# Patient Record
Sex: FEMALE | Race: WHITE | NOT HISPANIC OR LATINO | Employment: FULL TIME | URBAN - METROPOLITAN AREA
[De-identification: names, ages, dates, MRNs, and addresses within clinical notes are randomized per-mention and may not be internally consistent; named-entity substitution may affect disease eponyms.]

---

## 2018-09-18 ENCOUNTER — HOSPITAL ENCOUNTER (EMERGENCY)
Facility: HOSPITAL | Age: 28
Discharge: HOME | End: 2018-09-18
Attending: EMERGENCY MEDICINE | Admitting: EMERGENCY MEDICINE

## 2018-09-18 ENCOUNTER — APPOINTMENT (OUTPATIENT)
Dept: ULTRASOUND IMAGING | Facility: HOSPITAL | Age: 28
End: 2018-09-18

## 2018-09-18 VITALS
BODY MASS INDEX: 23.56 KG/M2 | RESPIRATION RATE: 16 BRPM | HEART RATE: 79 BPM | SYSTOLIC BLOOD PRESSURE: 118 MMHG | TEMPERATURE: 98.8 F | HEIGHT: 60 IN | WEIGHT: 120 LBS | OXYGEN SATURATION: 97 % | DIASTOLIC BLOOD PRESSURE: 79 MMHG

## 2018-09-18 DIAGNOSIS — O03.4 INCOMPLETE MISCARRIAGE: Primary | ICD-10-CM

## 2018-09-18 LAB
BASOPHILS # BLD AUTO: 0.02 10*3/MM3 (ref 0–0.2)
BASOPHILS NFR BLD AUTO: 0.2 % (ref 0–1.5)
BILIRUB UR QL STRIP: NEGATIVE
CLARITY UR: CLEAR
CLUE CELLS SPEC QL WET PREP: ABNORMAL
COLOR UR: YELLOW
DEPRECATED RDW RBC AUTO: 45.4 FL (ref 37–54)
EOSINOPHIL # BLD AUTO: 0.35 10*3/MM3 (ref 0–0.7)
EOSINOPHIL NFR BLD AUTO: 3.1 % (ref 0.3–6.2)
ERYTHROCYTE [DISTWIDTH] IN BLOOD BY AUTOMATED COUNT: 12.8 % (ref 11.7–13)
GLUCOSE UR STRIP-MCNC: NEGATIVE MG/DL
HCG INTACT+B SERPL-ACNC: 143 MIU/ML
HCT VFR BLD AUTO: 37.5 % (ref 35.6–45.5)
HGB BLD-MCNC: 12.6 G/DL (ref 11.9–15.5)
HGB UR QL STRIP.AUTO: NEGATIVE
HOLD SPECIMEN: NORMAL
HOLD SPECIMEN: NORMAL
HYDATID CYST SPEC WET PREP: ABNORMAL
IMM GRANULOCYTES # BLD: 0.02 10*3/MM3 (ref 0–0.03)
IMM GRANULOCYTES NFR BLD: 0.2 % (ref 0–0.5)
KETONES UR QL STRIP: NEGATIVE
LEUKOCYTE ESTERASE UR QL STRIP.AUTO: NEGATIVE
LYMPHOCYTES # BLD AUTO: 3.3 10*3/MM3 (ref 0.9–4.8)
LYMPHOCYTES NFR BLD AUTO: 29.4 % (ref 19.6–45.3)
MCH RBC QN AUTO: 32.7 PG (ref 26.9–32)
MCHC RBC AUTO-ENTMCNC: 33.6 G/DL (ref 32.4–36.3)
MCV RBC AUTO: 97.4 FL (ref 80.5–98.2)
MONOCYTES # BLD AUTO: 0.92 10*3/MM3 (ref 0.2–1.2)
MONOCYTES NFR BLD AUTO: 8.2 % (ref 5–12)
NEUTROPHILS # BLD AUTO: 6.65 10*3/MM3 (ref 1.9–8.1)
NEUTROPHILS NFR BLD AUTO: 59.1 % (ref 42.7–76)
NITRITE UR QL STRIP: NEGATIVE
PH UR STRIP.AUTO: 7 [PH] (ref 5–8)
PLATELET # BLD AUTO: 325 10*3/MM3 (ref 140–500)
PMV BLD AUTO: 9.5 FL (ref 6–12)
PROT UR QL STRIP: NEGATIVE
RBC # BLD AUTO: 3.85 10*6/MM3 (ref 3.9–5.2)
SP GR UR STRIP: 1.01 (ref 1–1.03)
T VAGINALIS SPEC QL WET PREP: ABNORMAL
UROBILINOGEN UR QL STRIP: NORMAL
WBC NRBC COR # BLD: 11.24 10*3/MM3 (ref 4.5–10.7)
WBC SPEC QL WET PREP: ABNORMAL
WHOLE BLOOD HOLD SPECIMEN: NORMAL
WHOLE BLOOD HOLD SPECIMEN: NORMAL
YEAST GENITAL QL WET PREP: ABNORMAL

## 2018-09-18 PROCEDURE — 25010000002 ONDANSETRON PER 1 MG: Performed by: PHYSICIAN ASSISTANT

## 2018-09-18 PROCEDURE — 87591 N.GONORRHOEAE DNA AMP PROB: CPT | Performed by: PHYSICIAN ASSISTANT

## 2018-09-18 PROCEDURE — 81003 URINALYSIS AUTO W/O SCOPE: CPT | Performed by: NURSE PRACTITIONER

## 2018-09-18 PROCEDURE — 87491 CHLMYD TRACH DNA AMP PROBE: CPT | Performed by: PHYSICIAN ASSISTANT

## 2018-09-18 PROCEDURE — 76815 OB US LIMITED FETUS(S): CPT

## 2018-09-18 PROCEDURE — 96374 THER/PROPH/DIAG INJ IV PUSH: CPT

## 2018-09-18 PROCEDURE — P9612 CATHETERIZE FOR URINE SPEC: HCPCS

## 2018-09-18 PROCEDURE — 76817 TRANSVAGINAL US OBSTETRIC: CPT

## 2018-09-18 PROCEDURE — 84702 CHORIONIC GONADOTROPIN TEST: CPT | Performed by: NURSE PRACTITIONER

## 2018-09-18 PROCEDURE — 96375 TX/PRO/DX INJ NEW DRUG ADDON: CPT

## 2018-09-18 PROCEDURE — 93976 VASCULAR STUDY: CPT

## 2018-09-18 PROCEDURE — 25010000002 KETOROLAC TROMETHAMINE PER 15 MG: Performed by: PHYSICIAN ASSISTANT

## 2018-09-18 PROCEDURE — 87210 SMEAR WET MOUNT SALINE/INK: CPT | Performed by: PHYSICIAN ASSISTANT

## 2018-09-18 PROCEDURE — 99283 EMERGENCY DEPT VISIT LOW MDM: CPT

## 2018-09-18 PROCEDURE — 85025 COMPLETE CBC W/AUTO DIFF WBC: CPT | Performed by: NURSE PRACTITIONER

## 2018-09-18 RX ORDER — KETOROLAC TROMETHAMINE 30 MG/ML
15 INJECTION, SOLUTION INTRAMUSCULAR; INTRAVENOUS ONCE
Status: COMPLETED | OUTPATIENT
Start: 2018-09-18 | End: 2018-09-18

## 2018-09-18 RX ORDER — ONDANSETRON 2 MG/ML
4 INJECTION INTRAMUSCULAR; INTRAVENOUS ONCE
Status: COMPLETED | OUTPATIENT
Start: 2018-09-18 | End: 2018-09-18

## 2018-09-18 RX ORDER — SODIUM CHLORIDE 0.9 % (FLUSH) 0.9 %
10 SYRINGE (ML) INJECTION AS NEEDED
Status: DISCONTINUED | OUTPATIENT
Start: 2018-09-18 | End: 2018-09-18 | Stop reason: HOSPADM

## 2018-09-18 RX ADMIN — ONDANSETRON 4 MG: 2 INJECTION INTRAMUSCULAR; INTRAVENOUS at 18:02

## 2018-09-18 RX ADMIN — KETOROLAC TROMETHAMINE 15 MG: 30 INJECTION, SOLUTION INTRAMUSCULAR at 17:55

## 2018-09-18 NOTE — ED PROVIDER NOTES
EMERGENCY DEPARTMENT ENCOUNTER    Room Number:  35/35  Date seen:  9/18/2018  Time seen: 5:05 PM  PCP: Tigre Esquivel MD    HPI:  Chief complaint: vaginal bleeding  Context:Karissa Roach is a 27 y.o. female who presents to the ED with c/o increasing vaginal bleeding  and abdominal cramping during pregnancy. She does not know when her last period was, cannot estimate it, states she started having bleeding 4 weeks ago that has been continuous. She states that she has started to pass clots now as well and goes through a pad about every hour and a half. She also c/o cold chills, subjective fever, dysuria, and nausea.  She is approx 6 weeks pregnant and Pt is G3, P1, and AB1; she had a miscarriage 5 years ago. She was seen a few days ago at [facility] and had an ultrasound.    Onset: gradual  Location: vagina  Radiation: none  Duration: 2-3 weeks  Timing: constant  Character: bleeding with clots  Aggravating Factors: none  Alleviating Factors: none  Severity: moderate    MEDICAL RECORD REVIEW  Pt was seen at [facility] on 9/14/18 and she had a pelvic US that showed a small collection of fluid in the endometrial canal that could represent gestational sac at 5 weeks and 2 days or a blighted ovum or a pseudo-gestational sac as no yolk sac could be identified. Her hCG was 210. She is Rh- and rhogam was given.     ALLERGIES  Patient has no known allergies.    PAST MEDICAL HISTORY  Active Ambulatory Problems     Diagnosis Date Noted   • No Active Ambulatory Problems     Resolved Ambulatory Problems     Diagnosis Date Noted   • No Resolved Ambulatory Problems     Past Medical History:   Diagnosis Date   • Anemia        PAST SURGICAL HISTORY  Past Surgical History:   Procedure Laterality Date   • ABDOMINAL SURGERY         FAMILY HISTORY  History reviewed. No pertinent family history.    SOCIAL HISTORY  Social History     Social History   • Marital status: Single     Spouse name: N/A   • Number of children: N/A   •  Years of education: N/A     Occupational History   • Not on file.     Social History Main Topics   • Smoking status: Current Every Day Smoker   • Smokeless tobacco: Not on file   • Alcohol use Yes      Comment: socially   • Drug use: No   • Sexual activity: Defer     Other Topics Concern   • Not on file     Social History Narrative   • No narrative on file       REVIEW OF SYSTEMS  Review of Systems   Constitutional: Positive for chills and fever (subjective).   HENT: Negative.    Eyes: Negative.    Respiratory: Negative for shortness of breath.    Cardiovascular: Negative for chest pain.   Gastrointestinal: Positive for abdominal pain (suprapubic cramping) and nausea.   Genitourinary: Positive for dysuria and vaginal bleeding.   Musculoskeletal: Negative.    Skin: Negative.    Neurological: Negative.    Psychiatric/Behavioral: Negative.        PHYSICAL EXAM  ED Triage Vitals   Temp Heart Rate Resp BP SpO2   09/18/18 1525 09/18/18 1525 09/18/18 1525 09/18/18 1548 09/18/18 1525   98.9 °F (37.2 °C) 99 15 119/87 100 %      Temp src Heart Rate Source Patient Position BP Location FiO2 (%)   09/18/18 1525 09/18/18 1525 09/18/18 1548 09/18/18 1548 --   Tympanic Monitor Sitting Right arm      Physical Exam   Constitutional: She is oriented to person, place, and time and well-developed, well-nourished, and in no distress.   HENT:   Head: Normocephalic and atraumatic.   Right Ear: External ear normal.   Left Ear: External ear normal.   Nose: Nose normal.   Eyes: Conjunctivae are normal.   Neck: Normal range of motion.   Cardiovascular: Normal rate and regular rhythm.    Pulmonary/Chest: Effort normal and breath sounds normal.   Abdominal:   Normal bowel sounds  Soft  Mild suprapubic tenderness  Nondistended  No rebound, guarding, or rigidity  No appreciable organomegaly  No CVA tenderness     Genitourinary: Vulva normal. Cervix exhibits no tenderness. Left adnexum displays no tenderness.   Genitourinary Comments: Small amount  of dark red blood in the vaginal vault. No membranes visualized. The cervix is dilated to a fingertip. There was no cervical motion tenderness or adnxeal tenderness. There was some mild uterine tenderness. There was no discharge. There is some fluctuance in the right bartholin's with no induration or tenderness.   Musculoskeletal: Normal range of motion.   Neurological: She is alert and oriented to person, place, and time.   Skin: Skin is warm and dry.   Psychiatric: Affect normal.   Nursing note and vitals reviewed.      LAB RESULTS  Recent Results (from the past 24 hour(s))   Light Blue Top    Collection Time: 09/18/18  3:50 PM   Result Value Ref Range    Extra Tube hold for add-on    Green Top (Gel)    Collection Time: 09/18/18  3:50 PM   Result Value Ref Range    Extra Tube Hold for add-ons.    Lavender Top    Collection Time: 09/18/18  3:50 PM   Result Value Ref Range    Extra Tube hold for add-on    Gold Top - SST    Collection Time: 09/18/18  3:50 PM   Result Value Ref Range    Extra Tube Hold for add-ons.    hCG, Quantitative, Pregnancy    Collection Time: 09/18/18  3:50 PM   Result Value Ref Range    HCG Quantitative 143.00 mIU/mL   CBC Auto Differential    Collection Time: 09/18/18  3:50 PM   Result Value Ref Range    WBC 11.24 (H) 4.50 - 10.70 10*3/mm3    RBC 3.85 (L) 3.90 - 5.20 10*6/mm3    Hemoglobin 12.6 11.9 - 15.5 g/dL    Hematocrit 37.5 35.6 - 45.5 %    MCV 97.4 80.5 - 98.2 fL    MCH 32.7 (H) 26.9 - 32.0 pg    MCHC 33.6 32.4 - 36.3 g/dL    RDW 12.8 11.7 - 13.0 %    RDW-SD 45.4 37.0 - 54.0 fl    MPV 9.5 6.0 - 12.0 fL    Platelets 325 140 - 500 10*3/mm3    Neutrophil % 59.1 42.7 - 76.0 %    Lymphocyte % 29.4 19.6 - 45.3 %    Monocyte % 8.2 5.0 - 12.0 %    Eosinophil % 3.1 0.3 - 6.2 %    Basophil % 0.2 0.0 - 1.5 %    Immature Grans % 0.2 0.0 - 0.5 %    Neutrophils, Absolute 6.65 1.90 - 8.10 10*3/mm3    Lymphocytes, Absolute 3.30 0.90 - 4.80 10*3/mm3    Monocytes, Absolute 0.92 0.20 - 1.20 10*3/mm3     Eosinophils, Absolute 0.35 0.00 - 0.70 10*3/mm3    Basophils, Absolute 0.02 0.00 - 0.20 10*3/mm3    Immature Grans, Absolute 0.02 0.00 - 0.03 10*3/mm3   Urinalysis With Microscopic If Indicated (No Culture) - Urine, Catheter    Collection Time: 18  5:34 PM   Result Value Ref Range    Color, UA Yellow Yellow, Straw    Appearance, UA Clear Clear    pH, UA 7.0 5.0 - 8.0    Specific Gravity, UA 1.014 1.005 - 1.030    Glucose, UA Negative Negative    Ketones, UA Negative Negative    Bilirubin, UA Negative Negative    Blood, UA Negative Negative    Protein, UA Negative Negative    Leuk Esterase, UA Negative Negative    Nitrite, UA Negative Negative    Urobilinogen, UA 0.2 E.U./dL 0.2 - 1.0 E.U./dL   Wet Prep, Genital - Swab, Vagina    Collection Time: 18  5:38 PM   Result Value Ref Range    YEAST No yeast seen No yeast seen    HYPHAL ELEMENTS No Hyphal elements seen No Hyphal elements seen    WBC'S 1+ WBC's seen (A) No WBC's seen    Clue Cells, Wet Prep No Clue cells seen No Clue cells seen    Trichomonas, Wet Prep No Trichomonas seen No Trichomonas seen       I ordered the above labs and reviewed the results    RADIOLOGY  US Ob Limited 1 + Fetuses   Final Result      US Ob Transvaginal    (Results Pending)   US Testicular or Ovarian Vascular Limited    (Results Pending)   Reviewed Ultrasound which shows an incomplete spontaneous . Independently viewed by me. Interpreted by radiologist.      I ordered the above noted radiological studies and reviewed the images on the PACS system.     MEDICATIONS GIVEN IN ER  Medications   sodium chloride 0.9 % flush 10 mL (not administered)   ketorolac (TORADOL) injection 15 mg (15 mg Intravenous Given 18 1755)   ondansetron (ZOFRAN) injection 4 mg (4 mg Intravenous Given 18 1802)         PROCEDURES  Procedures      PROGRESS AND CONSULTS    Progress Notes:    ED Course as of Sep 18 2028   Tue Sep 18, 2018   1551 Vaginal bleeding x 2-3 weeks. Dx with  "threatened AB at [facility] 4 days ago, continuing to bleed. Couldn't get into OBGYN office, sees Dr Mary. LMP unknown. Rh neg, given Rho alicia at [facility].  [JS]      ED Course User Index  [JS] Emma Roberts, APRN       Informed Pt that her hCG quant has decreased which indicates that she is having a miscarriage. However, I am still concerned about her other symptoms including fever, chills, nausea, and dysuria.        Reviewed pt's history and workup with Dr. Werner.  After a bedside evaluation, Dr. Werner agrees with the plan of care.      Rechecked Pt who is resting comfortably. Informed Pt that her US shows findings consistentwith an incomplete .  I will consult with Dr. Mobley and discussed this with Pt prior to her discharge. Pt understands and agrees to all plans. All questions answered.   Placed call to Dr. Mobley ([facility]) for consult.       Discussed with Dr. Mobley who would like the patient to track her temperature at home with a thermometer. If above 100, he would like her to return to the ER. Otherwise he would like to see her in the office tomorrow afternoon.       Discussed plan with the patient. She verbalizes understanding and is agreeable. She is stable for discharge.       Disposition vitals:  /83 (Patient Position: Lying)   Pulse 75   Temp 98.9 °F (37.2 °C) (Tympanic)   Resp 18   Ht 152.4 cm (60\")   Wt 54.4 kg (120 lb)   SpO2 97%   BMI 23.44 kg/m²       DIAGNOSIS  Final diagnoses:   Incomplete miscarriage     DISCHARGE    Patient discharged in stable condition.    Reviewed implications of results, diagnosis, meds, responsibility to follow up, warning signs and symptoms of possible worsening, potential complications and reasons to return to ER, including new or worsening symptoms.    Patient/Family voiced understanding of above instructions.    Discussed plan for discharge, as there is no emergent indication for admission. Patient referred to primary care " provider for BP management due to today's BP. Pt/family is agreeable and understands need for follow up and repeat testing.  Pt is aware that discharge does not mean that nothing is wrong but it indicates no emergency is present that requires admission and they must continue care with follow-up as given below or physician of their choice.     FOLLOW-UP  Jose Mobley MD  [address] [place] [address]  [phone]    Schedule an appointment as soon as possible for a visit            Medication List      No changes were made to your prescriptions during this visit.           Documentation assistance provided by cheo Monroy for Debby Fuller PA-C.  Information recorded by the scribe was done at my direction and has been verified and validated by me.       Claudia Monroy  09/18/18 2002       Debby Fuller PA  09/18/18 2029

## 2018-09-18 NOTE — ED TRIAGE NOTES
Pt reports she was 6 weeks pregnant, was seen by OBGYN with [facility] two days ago and dx with miscarriage, pt reports vaginal bleeding x2 weeks, vaginal heavier and with clots today. OBGYN-Dr. Mobley

## 2018-09-18 NOTE — ED PROVIDER NOTES
Pt presents to the ED c/o vaginal bleeding that began 2 weeks ago. Pt states bleeding began as spotting and worsened today with heavy bleeding and passing clots. Pt c/o lower back pain and nausea. Pt denies vomiting. Pt was seen at [facility] 4 days ago ED and dx with a miscarriage. Pt was given Rhogam at that time. LMP was in July. Pt is /A1  OB/GYN Dr. Mobley    On exam,   Heart and lungs are normal  Mild suprapubic tenderness, no rebound  No CVA tenderness  Pelvic exam performed by TRUMAN Boateng  Pt's blood type is A-    Plan: Review pelvic ultrasound    Attestation:  The BROWN and I have discussed this patient's history, physical exam, and treatment plan.  I have reviewed the documentation and personally had a face to face interaction with the patient. I affirm the documentation and agree with the treatment and plan.  The attached note describes my personal findings.      Documentation assistance provided by cheo Lee for Dr. Werner. Information recorded by the scribe was done at my direction and has been verified and validated by me.          Kathia Lee  [phone]       Danyel Werner MD  [phone]

## 2018-09-19 ENCOUNTER — TELEPHONE (OUTPATIENT)
Dept: OBSTETRICS AND GYNECOLOGY | Facility: CLINIC | Age: 28
End: 2018-09-19

## 2018-09-19 ENCOUNTER — DOCUMENTATION (OUTPATIENT)
Dept: OBSTETRICS AND GYNECOLOGY | Facility: CLINIC | Age: 28
End: 2018-09-19

## 2018-09-19 ENCOUNTER — OFFICE VISIT (OUTPATIENT)
Dept: OBSTETRICS AND GYNECOLOGY | Facility: CLINIC | Age: 28
End: 2018-09-19

## 2018-09-19 ENCOUNTER — PROCEDURE VISIT (OUTPATIENT)
Dept: OBSTETRICS AND GYNECOLOGY | Facility: CLINIC | Age: 28
End: 2018-09-19

## 2018-09-19 VITALS
WEIGHT: 116.6 LBS | DIASTOLIC BLOOD PRESSURE: 70 MMHG | BODY MASS INDEX: 22.89 KG/M2 | HEIGHT: 60 IN | SYSTOLIC BLOOD PRESSURE: 116 MMHG

## 2018-09-19 DIAGNOSIS — O03.4 INCOMPLETE ABORTION: ICD-10-CM

## 2018-09-19 DIAGNOSIS — O03.9 MISCARRIAGE: Primary | ICD-10-CM

## 2018-09-19 DIAGNOSIS — O02.1 ABORTION, MISSED: Primary | ICD-10-CM

## 2018-09-19 PROCEDURE — 99204 OFFICE O/P NEW MOD 45 MIN: CPT | Performed by: OBSTETRICS & GYNECOLOGY

## 2018-09-19 PROCEDURE — 76817 TRANSVAGINAL US OBSTETRIC: CPT | Performed by: OBSTETRICS & GYNECOLOGY

## 2018-09-19 RX ORDER — LANOLIN ALCOHOL/MO/W.PET/CERES
CREAM (GRAM) TOPICAL DAILY
COMMUNITY
End: 2021-06-09

## 2018-09-19 NOTE — PROGRESS NOTES
Subjective    Chief Complaint   Patient presents with   • Follow-up     fu, missed ab still bleeding and cramping,       History of Present Illness    Karissa Roach is a 27 y.o. female who presents for missed .  Patient was seen twice in the ER and given RhoGam for being Rh-.  Patient seen today in follow-up from an emergency room visit yesterday and ultrasound still showing probable Rx of conception within the uterus.  She has continued to have bleeding and cramping.  She has no documented fever at home but does have chills occasionally she says.  She looks good overall but is tired of ongoing bleeding.  The bleeding has not been overly heavy recently but has occurred for several weeks.    Obstetric History:  OB History     No data available         Menstrual History:     No LMP recorded (lmp unknown).       Past Medical History:   Diagnosis Date   • Anemia      History reviewed. No pertinent family history.  History   Smoking Status   • Current Every Day Smoker   Smokeless Tobacco   • Never Used     Ready to quit: Not Answered  Counseling given: Not Answered      The following portions of the patient's history were reviewed and updated as appropriate: allergies, current medications, past family history, past medical history, past social history, past surgical history and problem list.    Review of Systems   Constitutional: Negative.  Negative for fever and unexpected weight change.   HENT: Negative.    Respiratory: Negative for shortness of breath and wheezing.    Cardiovascular: Negative for chest pain, palpitations and leg swelling.   Gastrointestinal: Negative for abdominal pain, anal bleeding and blood in stool.   Genitourinary: Negative for dysuria, pelvic pain, urgency, vaginal bleeding, vaginal discharge and vaginal pain.        Continual vaginal bleeding from incomplete miscarriage.   Skin: Negative.    Neurological: Negative.    Hematological: Negative.  Negative for adenopathy.  "  Psychiatric/Behavioral: Negative.  Negative for dysphoric mood. The patient is not nervous/anxious.             Objective   Physical Exam   Constitutional: She is oriented to person, place, and time. Vital signs are normal. She appears well-developed and well-nourished.   HENT:   Head: Normocephalic.   Neck: Trachea normal. No tracheal deviation present. No thyromegaly present.   Cardiovascular: Normal rate, regular rhythm and normal heart sounds.    No murmur heard.  Pulmonary/Chest: Effort normal and breath sounds normal.   Abdominal: Soft. Normal appearance. She exhibits no mass. There is no hepatosplenomegaly. There is no tenderness. No hernia.   Genitourinary: Rectum normal, vagina normal and uterus normal. Uterus is not enlarged and not tender. Cervix exhibits no motion tenderness. Right adnexum displays no mass and no tenderness. Left adnexum displays no mass and no tenderness. No vaginal discharge found.   Genitourinary Comments: External genitalia normal .  Mild vaginal bleeding from os which is closed but no products of conception seen.  Bimanual exam with no unusual tenderness or evidence of infection.   Lymphadenopathy:     She has no cervical adenopathy.     She has no axillary adenopathy.   Neurological: She is alert and oriented to person, place, and time.   Skin: Skin is warm and dry. No rash noted.   Psychiatric: She has a normal mood and affect. Her behavior is normal. Cognition and memory are normal.       /70   Ht 152.4 cm (60\")   Wt 52.9 kg (116 lb 9.6 oz)   LMP  (LMP Unknown)   BMI 22.77 kg/m²     Assessment/Plan   Karissa was seen today for follow-up.    Diagnoses and all orders for this visit:    Miscarriage  -     Case Request; Standing  -     Case Request    Other orders  -     Follow Anesthesia Guidelines / Standing Orders; Future  -     Follow Anesthesia Guidelines / Standing Orders; Standing  -     Obtain Informed Consent; Standing        Pelvic ultrasound.       Discussed " options in detail including Cytotec pill for medical treatment of miscarriage versus suction D&C.  Patient very much wants a D&C if she is very tired of her bleeding.  She has been explained in detail the risks and possible complications of D&C.  This includes the risk of anesthesia and possible uterine perforation in the need for additional surgery.  We will try to set that up tomorrow if at all possible.

## 2018-09-19 NOTE — PROGRESS NOTES
H&P Note    Patient Identification:  Name: Karissa Roach  Age: 27 y.o.  Sex: female  :  1990  MRN: [id number]                       Chief Complaint:  Miscarriage    History of Present Illness:   Patient is a 27-year-old  Ab1 white female who presented to the office with an incomplete miscarriage as diagnosed by serial ultrasound scans into emergency rooms showing retained products of conception with no fetal cardiac activity or evidence of viable pregnancy.  Patient's hCGs have dropped from 200-145.  She is still bleeding and wants a suction curettage understanding the risks of the surgery including uterine perforation possibility.  She is Rh- and has received RhoGam in  1 emergency room.    Problem List:  [unfilled]  Past Medical History:  Past Medical History:   Diagnosis Date   • Anemia      Past Surgical History:  Past Surgical History:   Procedure Laterality Date   • ABDOMINAL SURGERY     • OTHER SURGICAL HISTORY      removal of endometriosis in       Home Meds:    (Not in a hospital admission)  Current Meds:   [unfilled]  Allergies:  No Known Allergies  Immunizations:    There is no immunization history on file for this patient.  Social History:   Social History   Substance Use Topics   • Smoking status: Current Every Day Smoker   • Smokeless tobacco: Never Used   • Alcohol use Yes      Comment: socially      Family History:  No family history on file.     Review of Systems  Pertinent items are noted in HPI.    Objective:  tMax 24 hrs: @TMAX(24)@  Vitals Ranges:   Temp:  [98.8 °F (37.1 °C)] 98.8 °F (37.1 °C)  Heart Rate:  [75-79] 79  Resp:  [16-18] 16  BP: (115-118)/(70-83) 116/70  Intake and Output Last 3 Shifts:   [unfilled]    Exam:     General Appearance:    Alert, cooperative, no distress, appears stated age   Head:    Normocephalic, without obvious abnormality, atraumatic   Back:     Symmetric, no curvature, ROM normal, no CVA tenderness   Lungs:     Clear to auscultation  bilaterally, respirations unlabored   Chest Wall:    No tenderness or deformity    Heart:    Regular rate and rhythm, S1 and S2 normal, no murmur, rub   or gallop       Abdomen:     Soft, non-tender, bowel sounds active all four quadrants,     no masses, no organomegaly   Genitalia:    Normal female without lesion, discharge or tenderness.  Mild vaginal bleeding from closed cervix today with uterus minimally enlarged but not overly tender..  Adnexa negative.  Still with old clots in vagina.         Extremities:   Extremities normal, atraumatic, no cyanosis or edema   Skin:   Skin color, texture, turgor normal, no rashes or lesions           Data Review:     Lab Results (last 24 hours)     ** No results found for the last 24 hours. **        Assessment:  Active Problems:    * No active hospital problems. *      1. Incomplete Miscarriage     Plan:  1. Suction D&C        Jose Mobley MD  9/19/2018

## 2018-09-20 ENCOUNTER — ANESTHESIA EVENT (OUTPATIENT)
Dept: PERIOP | Facility: HOSPITAL | Age: 28
End: 2018-09-20

## 2018-09-20 ENCOUNTER — ANESTHESIA (OUTPATIENT)
Dept: PERIOP | Facility: HOSPITAL | Age: 28
End: 2018-09-20

## 2018-09-20 ENCOUNTER — HOSPITAL ENCOUNTER (OUTPATIENT)
Facility: HOSPITAL | Age: 28
Setting detail: HOSPITAL OUTPATIENT SURGERY
Discharge: HOME | End: 2018-09-20
Attending: OBSTETRICS & GYNECOLOGY | Admitting: OBSTETRICS & GYNECOLOGY

## 2018-09-20 VITALS
DIASTOLIC BLOOD PRESSURE: 76 MMHG | HEIGHT: 60 IN | SYSTOLIC BLOOD PRESSURE: 91 MMHG | OXYGEN SATURATION: 99 % | TEMPERATURE: 97.6 F | RESPIRATION RATE: 16 BRPM | HEART RATE: 79 BPM | BODY MASS INDEX: 22.68 KG/M2 | WEIGHT: 115.52 LBS

## 2018-09-20 DIAGNOSIS — O03.9 MISCARRIAGE: ICD-10-CM

## 2018-09-20 LAB
ABO GROUP BLD: NORMAL
BLD GP AB SCN SERPL QL: POSITIVE
RESIDUAL RHIG DETECTED: NORMAL
RH BLD: NEGATIVE
T&S EXPIRATION DATE: NORMAL

## 2018-09-20 PROCEDURE — 59812 TREATMENT OF MISCARRIAGE: CPT | Performed by: OBSTETRICS & GYNECOLOGY

## 2018-09-20 PROCEDURE — 86900 BLOOD TYPING SEROLOGIC ABO: CPT | Performed by: OBSTETRICS & GYNECOLOGY

## 2018-09-20 PROCEDURE — 86901 BLOOD TYPING SEROLOGIC RH(D): CPT | Performed by: OBSTETRICS & GYNECOLOGY

## 2018-09-20 PROCEDURE — 25010000002 FENTANYL CITRATE (PF) 100 MCG/2ML SOLUTION: Performed by: NURSE ANESTHETIST, CERTIFIED REGISTERED

## 2018-09-20 PROCEDURE — 25010000002 PROPOFOL 10 MG/ML EMULSION: Performed by: NURSE ANESTHETIST, CERTIFIED REGISTERED

## 2018-09-20 PROCEDURE — 88305 TISSUE EXAM BY PATHOLOGIST: CPT | Performed by: OBSTETRICS & GYNECOLOGY

## 2018-09-20 PROCEDURE — 25010000002 FENTANYL CITRATE (PF) 100 MCG/2ML SOLUTION

## 2018-09-20 PROCEDURE — 25010000002 RHO D IMMUNE GLOBULIN 1500 UNIT/2ML SOLUTION PREFILLED SYRINGE: Performed by: OBSTETRICS & GYNECOLOGY

## 2018-09-20 PROCEDURE — 25010000002 DEXAMETHASONE PER 1 MG: Performed by: NURSE ANESTHETIST, CERTIFIED REGISTERED

## 2018-09-20 PROCEDURE — 86850 RBC ANTIBODY SCREEN: CPT | Performed by: OBSTETRICS & GYNECOLOGY

## 2018-09-20 PROCEDURE — 25010000002 MIDAZOLAM PER 1 MG: Performed by: ANESTHESIOLOGY

## 2018-09-20 PROCEDURE — 86870 RBC ANTIBODY IDENTIFICATION: CPT | Performed by: OBSTETRICS & GYNECOLOGY

## 2018-09-20 PROCEDURE — 25010000002 ONDANSETRON PER 1 MG: Performed by: NURSE ANESTHETIST, CERTIFIED REGISTERED

## 2018-09-20 RX ORDER — MAGNESIUM HYDROXIDE 1200 MG/15ML
LIQUID ORAL AS NEEDED
Status: DISCONTINUED | OUTPATIENT
Start: 2018-09-20 | End: 2018-09-20 | Stop reason: HOSPADM

## 2018-09-20 RX ORDER — PROMETHAZINE HYDROCHLORIDE 25 MG/ML
12.5 INJECTION, SOLUTION INTRAMUSCULAR; INTRAVENOUS ONCE AS NEEDED
Status: DISCONTINUED | OUTPATIENT
Start: 2018-09-20 | End: 2018-09-20 | Stop reason: HOSPADM

## 2018-09-20 RX ORDER — ONDANSETRON 2 MG/ML
4 INJECTION INTRAMUSCULAR; INTRAVENOUS ONCE AS NEEDED
Status: DISCONTINUED | OUTPATIENT
Start: 2018-09-20 | End: 2018-09-20 | Stop reason: HOSPADM

## 2018-09-20 RX ORDER — FLUMAZENIL 0.1 MG/ML
0.2 INJECTION INTRAVENOUS AS NEEDED
Status: DISCONTINUED | OUTPATIENT
Start: 2018-09-20 | End: 2018-09-20 | Stop reason: HOSPADM

## 2018-09-20 RX ORDER — FENTANYL CITRATE 50 UG/ML
INJECTION, SOLUTION INTRAMUSCULAR; INTRAVENOUS AS NEEDED
Status: DISCONTINUED | OUTPATIENT
Start: 2018-09-20 | End: 2018-09-20 | Stop reason: SURG

## 2018-09-20 RX ORDER — LABETALOL HYDROCHLORIDE 5 MG/ML
5 INJECTION, SOLUTION INTRAVENOUS
Status: DISCONTINUED | OUTPATIENT
Start: 2018-09-20 | End: 2018-09-20 | Stop reason: HOSPADM

## 2018-09-20 RX ORDER — PROMETHAZINE HYDROCHLORIDE 25 MG/1
12.5 TABLET ORAL ONCE AS NEEDED
Status: DISCONTINUED | OUTPATIENT
Start: 2018-09-20 | End: 2018-09-20 | Stop reason: HOSPADM

## 2018-09-20 RX ORDER — FAMOTIDINE 10 MG/ML
20 INJECTION, SOLUTION INTRAVENOUS ONCE
Status: COMPLETED | OUTPATIENT
Start: 2018-09-20 | End: 2018-09-20

## 2018-09-20 RX ORDER — MIDAZOLAM HYDROCHLORIDE 1 MG/ML
1 INJECTION INTRAMUSCULAR; INTRAVENOUS
Status: DISCONTINUED | OUTPATIENT
Start: 2018-09-20 | End: 2018-09-20 | Stop reason: HOSPADM

## 2018-09-20 RX ORDER — PROPOFOL 10 MG/ML
VIAL (ML) INTRAVENOUS AS NEEDED
Status: DISCONTINUED | OUTPATIENT
Start: 2018-09-20 | End: 2018-09-20 | Stop reason: SURG

## 2018-09-20 RX ORDER — NALOXONE HCL 0.4 MG/ML
0.2 VIAL (ML) INJECTION AS NEEDED
Status: DISCONTINUED | OUTPATIENT
Start: 2018-09-20 | End: 2018-09-20 | Stop reason: HOSPADM

## 2018-09-20 RX ORDER — HYDROCODONE BITARTRATE AND ACETAMINOPHEN 7.5; 325 MG/1; MG/1
1 TABLET ORAL ONCE AS NEEDED
Status: DISCONTINUED | OUTPATIENT
Start: 2018-09-20 | End: 2018-09-20 | Stop reason: HOSPADM

## 2018-09-20 RX ORDER — PROMETHAZINE HYDROCHLORIDE 25 MG/1
25 SUPPOSITORY RECTAL ONCE AS NEEDED
Status: DISCONTINUED | OUTPATIENT
Start: 2018-09-20 | End: 2018-09-20 | Stop reason: HOSPADM

## 2018-09-20 RX ORDER — DEXAMETHASONE SODIUM PHOSPHATE 4 MG/ML
INJECTION, SOLUTION INTRA-ARTICULAR; INTRALESIONAL; INTRAMUSCULAR; INTRAVENOUS; SOFT TISSUE AS NEEDED
Status: DISCONTINUED | OUTPATIENT
Start: 2018-09-20 | End: 2018-09-20 | Stop reason: SURG

## 2018-09-20 RX ORDER — MIDAZOLAM HYDROCHLORIDE 1 MG/ML
2 INJECTION INTRAMUSCULAR; INTRAVENOUS
Status: DISCONTINUED | OUTPATIENT
Start: 2018-09-20 | End: 2018-09-20 | Stop reason: HOSPADM

## 2018-09-20 RX ORDER — SODIUM CHLORIDE 0.9 % (FLUSH) 0.9 %
1-10 SYRINGE (ML) INJECTION AS NEEDED
Status: DISCONTINUED | OUTPATIENT
Start: 2018-09-20 | End: 2018-09-20 | Stop reason: HOSPADM

## 2018-09-20 RX ORDER — SODIUM CHLORIDE, SODIUM LACTATE, POTASSIUM CHLORIDE, CALCIUM CHLORIDE 600; 310; 30; 20 MG/100ML; MG/100ML; MG/100ML; MG/100ML
9 INJECTION, SOLUTION INTRAVENOUS CONTINUOUS
Status: DISCONTINUED | OUTPATIENT
Start: 2018-09-20 | End: 2018-09-20 | Stop reason: HOSPADM

## 2018-09-20 RX ORDER — ONDANSETRON 2 MG/ML
INJECTION INTRAMUSCULAR; INTRAVENOUS AS NEEDED
Status: DISCONTINUED | OUTPATIENT
Start: 2018-09-20 | End: 2018-09-20 | Stop reason: SURG

## 2018-09-20 RX ORDER — OXYCODONE AND ACETAMINOPHEN 7.5; 325 MG/1; MG/1
1 TABLET ORAL ONCE AS NEEDED
Status: COMPLETED | OUTPATIENT
Start: 2018-09-20 | End: 2018-09-20

## 2018-09-20 RX ORDER — EPHEDRINE SULFATE 50 MG/ML
5 INJECTION, SOLUTION INTRAVENOUS ONCE AS NEEDED
Status: DISCONTINUED | OUTPATIENT
Start: 2018-09-20 | End: 2018-09-20 | Stop reason: HOSPADM

## 2018-09-20 RX ORDER — CEPHALEXIN 500 MG/1
500 CAPSULE ORAL 4 TIMES DAILY
Qty: 40 CAPSULE | Refills: 0 | Status: SHIPPED | OUTPATIENT
Start: 2018-09-20 | End: 2018-09-25

## 2018-09-20 RX ORDER — FENTANYL CITRATE 50 UG/ML
INJECTION, SOLUTION INTRAMUSCULAR; INTRAVENOUS
Status: COMPLETED
Start: 2018-09-20 | End: 2018-09-20

## 2018-09-20 RX ORDER — PROMETHAZINE HYDROCHLORIDE 25 MG/1
25 TABLET ORAL ONCE AS NEEDED
Status: DISCONTINUED | OUTPATIENT
Start: 2018-09-20 | End: 2018-09-20 | Stop reason: HOSPADM

## 2018-09-20 RX ORDER — FENTANYL CITRATE 50 UG/ML
50 INJECTION, SOLUTION INTRAMUSCULAR; INTRAVENOUS
Status: DISCONTINUED | OUTPATIENT
Start: 2018-09-20 | End: 2018-09-20 | Stop reason: HOSPADM

## 2018-09-20 RX ORDER — LIDOCAINE HYDROCHLORIDE 20 MG/ML
INJECTION, SOLUTION INFILTRATION; PERINEURAL AS NEEDED
Status: DISCONTINUED | OUTPATIENT
Start: 2018-09-20 | End: 2018-09-20 | Stop reason: SURG

## 2018-09-20 RX ORDER — DIPHENHYDRAMINE HYDROCHLORIDE 50 MG/ML
12.5 INJECTION INTRAMUSCULAR; INTRAVENOUS
Status: DISCONTINUED | OUTPATIENT
Start: 2018-09-20 | End: 2018-09-20 | Stop reason: HOSPADM

## 2018-09-20 RX ADMIN — FENTANYL CITRATE 50 MCG: 50 INJECTION INTRAMUSCULAR; INTRAVENOUS at 07:56

## 2018-09-20 RX ADMIN — FENTANYL CITRATE 25 MCG: 50 INJECTION INTRAMUSCULAR; INTRAVENOUS at 07:28

## 2018-09-20 RX ADMIN — OXYCODONE HYDROCHLORIDE AND ACETAMINOPHEN 1 TABLET: 7.5; 325 TABLET ORAL at 09:02

## 2018-09-20 RX ADMIN — FAMOTIDINE 20 MG: 10 INJECTION, SOLUTION INTRAVENOUS at 06:27

## 2018-09-20 RX ADMIN — DEXAMETHASONE SODIUM PHOSPHATE 4 MG: 4 INJECTION INTRA-ARTICULAR; INTRALESIONAL; INTRAMUSCULAR; INTRAVENOUS; SOFT TISSUE at 07:24

## 2018-09-20 RX ADMIN — MIDAZOLAM HYDROCHLORIDE 1 MG: 2 INJECTION, SOLUTION INTRAMUSCULAR; INTRAVENOUS at 06:27

## 2018-09-20 RX ADMIN — HUMAN RHO(D) IMMUNE GLOBULIN 1500 UNITS: 1500 SOLUTION INTRAMUSCULAR; INTRAVENOUS at 08:32

## 2018-09-20 RX ADMIN — SODIUM CHLORIDE, POTASSIUM CHLORIDE, SODIUM LACTATE AND CALCIUM CHLORIDE 9 ML/HR: 600; 310; 30; 20 INJECTION, SOLUTION INTRAVENOUS at 06:27

## 2018-09-20 RX ADMIN — FENTANYL CITRATE 50 MCG: 50 INJECTION, SOLUTION INTRAMUSCULAR; INTRAVENOUS at 07:56

## 2018-09-20 RX ADMIN — PROPOFOL 200 MG: 10 INJECTION, EMULSION INTRAVENOUS at 07:20

## 2018-09-20 RX ADMIN — ONDANSETRON 4 MG: 2 INJECTION INTRAMUSCULAR; INTRAVENOUS at 07:26

## 2018-09-20 RX ADMIN — LIDOCAINE HYDROCHLORIDE 100 MG: 20 INJECTION, SOLUTION INFILTRATION; PERINEURAL at 07:20

## 2018-09-20 RX ADMIN — FENTANYL CITRATE 50 MCG: 50 INJECTION INTRAMUSCULAR; INTRAVENOUS at 08:20

## 2018-09-20 NOTE — ANESTHESIA PROCEDURE NOTES
Airway  Urgency: elective    Date/Time: 9/20/2018 7:21 AM  Airway not difficult    General Information and Staff    Patient location during procedure: OR  Anesthesiologist: DONNIE MAR  CRNA: BUDDY CAMPBELL    Indications and Patient Condition  Indications for airway management: airway protection    Preoxygenated: yes  MILS maintained throughout  Mask difficulty assessment: 1 - vent by mask    Final Airway Details  Final airway type: supraglottic airway      Successful airway: classic  Size 4    Number of attempts at approach: 1    Additional Comments  Patient in OR. Monitors on. BLVS. Pre 02 100%. SIVI. LMA placed with ease. No leak present. BBS and ETCO2 present. Secured. Teeth/lips as preop.

## 2018-09-20 NOTE — OP NOTE
Operative Note      Karissa Roach  27 y.o.  1990  female  [id number]      9/20/2018    Surgeon(s) and Role:     * Jose Mobley MD - Primary     Pre-op Diagnosis:   Miscarriage [O03.9]    Post-Op Diagnosis Codes:     * Miscarriage [O03.9]    Post-operative Diagnosis: Same  Surgeon= Leandra                   Assist= NA  Findings/Complications:  Minor amount of old products of conception obtained    Description of procedure:  Procedure(s) and Anesthesia Type:     * DILATATION AND CURETTAGE WITH SUCTION - General     The patient was brought to the OR with IV running and general anesthesia administered and she was placed in the dorsal lithotomy position.  She was prepped and draped usual manner for vaginal procedure.  Examination under anesthesia revealed a normal-size anteverted uterus with no adnexal masses.  There was a 2-3 cm right Bartholin's cyst present and this will be followed up in the office as the patient did not discuss having any surgery done on this prior to today.  A weighted speculum was placed in the vagina and a Adams tenaculum on the anterior lip of the cervix and the endocervical canal was carefully dilated.  A suction curettage was then performed with a minor amount of old clots and products of conception obtained and sent to pathology.  Examination after this with a large sharp curette revealed the endometrial canal to be totally empty and a resection and also obtained no additional tissue.  All instruments were then removed with minimal blood loss and all counts correct and no complications and the patient was transferred to the recovery room in satisfactory condition.  She was ordered RhoGam postop due to her Rh- status.    Keflex 500 mg QID sent to pharmacy for Bartholin cyst in case getting infected   Anesthesia= general    Estimated Blood Loss: 20 mL    Specimens:   Order Name Source Comment Collection Info Order Time   TYPE AND SCREEN   Collected By: Daphnie Norton, KEITH 9/20/2018   5:43 AM   TISSUE PATHOLOGY EXAM Products of Conception  Collected By: Jose Mobley MD 9/20/2018  7:36 AM       [unfilled]      Jose Mobley MD  9/20/2018

## 2018-09-20 NOTE — ANESTHESIA POSTPROCEDURE EVALUATION
"Patient: Karissa Roach    Procedure Summary     Date:  09/20/18 Room / Location:  [facility] OR 09 / [facility] MAIN OR    Anesthesia Start:  0716 Anesthesia Stop:  0743    Procedure:  DILATATION AND CURETTAGE WITH SUCTION (N/A Vagina) Diagnosis:       Miscarriage      (Miscarriage [O03.9])    Surgeon:  Jose Mobley MD Provider:  Khadra Roberts MD    Anesthesia Type:  general ASA Status:  2          Anesthesia Type: general  Last vitals  BP   96/63 (09/20/18 0840)   Temp   36.4 °C (97.6 °F) (09/20/18 0744)   Pulse   73 (09/20/18 0840)   Resp   16 (09/20/18 0840)     SpO2   100 % (09/20/18 0840)     Post Anesthesia Care and Evaluation    Patient location during evaluation: bedside  Patient participation: complete - patient participated  Level of consciousness: awake  Pain management: adequate  Airway patency: patent  Anesthetic complications: No anesthetic complications    Cardiovascular status: acceptable  Respiratory status: acceptable  Hydration status: acceptable    Comments: BP 96/63   Pulse 73   Temp 36.4 °C (97.6 °F) (Oral)   Resp 16   Ht 152.4 cm (60\")   Wt 52.4 kg (115 lb 8.3 oz)   LMP  (LMP Unknown)   SpO2 100%   BMI 22.56 kg/m²         "

## 2018-09-20 NOTE — ANESTHESIA PREPROCEDURE EVALUATION
Anesthesia Evaluation     no history of anesthetic complications:               Airway   Mallampati: I  TM distance: >3 FB  Neck ROM: full  Dental - normal exam     Pulmonary    (+) a smoker Current,   (-) asthma  Cardiovascular     (-) hypertension, valvular problems/murmurs      Neuro/Psych  (-) dizziness/light headedness  GI/Hepatic/Renal/Endo    (-) hepatitis    Musculoskeletal     Abdominal    Substance History      OB/GYN          Other                        Anesthesia Plan    ASA 2     general     intravenous induction   Anesthetic plan, all risks, benefits, and alternatives have been provided, discussed and informed consent has been obtained with: patient.

## 2018-09-20 NOTE — H&P (VIEW-ONLY)
H&P Note    Patient Identification:  Name: Karissa Roach  Age: 27 y.o.  Sex: female  :  1990  MRN: [id number]                       Chief Complaint:  Miscarriage    History of Present Illness:   Patient is a 27-year-old  Ab1 white female who presented to the office with an incomplete miscarriage as diagnosed by serial ultrasound scans into emergency rooms showing retained products of conception with no fetal cardiac activity or evidence of viable pregnancy.  Patient's hCGs have dropped from 200-145.  She is still bleeding and wants a suction curettage understanding the risks of the surgery including uterine perforation possibility.  She is Rh- and has received RhoGam in  1 emergency room.    Problem List:  [unfilled]  Past Medical History:  Past Medical History:   Diagnosis Date   • Anemia      Past Surgical History:  Past Surgical History:   Procedure Laterality Date   • ABDOMINAL SURGERY     • OTHER SURGICAL HISTORY      removal of endometriosis in       Home Meds:    (Not in a hospital admission)  Current Meds:   [unfilled]  Allergies:  No Known Allergies  Immunizations:    There is no immunization history on file for this patient.  Social History:   Social History   Substance Use Topics   • Smoking status: Current Every Day Smoker   • Smokeless tobacco: Never Used   • Alcohol use Yes      Comment: socially      Family History:  No family history on file.     Review of Systems  Pertinent items are noted in HPI.    Objective:  tMax 24 hrs: @TMAX(24)@  Vitals Ranges:   Temp:  [98.8 °F (37.1 °C)] 98.8 °F (37.1 °C)  Heart Rate:  [75-79] 79  Resp:  [16-18] 16  BP: (115-118)/(70-83) 116/70  Intake and Output Last 3 Shifts:   [unfilled]    Exam:     General Appearance:    Alert, cooperative, no distress, appears stated age   Head:    Normocephalic, without obvious abnormality, atraumatic   Back:     Symmetric, no curvature, ROM normal, no CVA tenderness   Lungs:     Clear to auscultation  bilaterally, respirations unlabored   Chest Wall:    No tenderness or deformity    Heart:    Regular rate and rhythm, S1 and S2 normal, no murmur, rub   or gallop       Abdomen:     Soft, non-tender, bowel sounds active all four quadrants,     no masses, no organomegaly   Genitalia:    Normal female without lesion, discharge or tenderness.  Mild vaginal bleeding from closed cervix today with uterus minimally enlarged but not overly tender..  Adnexa negative.  Still with old clots in vagina.         Extremities:   Extremities normal, atraumatic, no cyanosis or edema   Skin:   Skin color, texture, turgor normal, no rashes or lesions           Data Review:     Lab Results (last 24 hours)     ** No results found for the last 24 hours. **        Assessment:  Active Problems:    * No active hospital problems. *      1. Incomplete Miscarriage     Plan:  1. Suction D&C        Jose Mobley MD  9/19/2018

## 2018-09-21 LAB
C TRACH RRNA SPEC DONR QL NAA+PROBE: NEGATIVE
CYTO UR: NORMAL
LAB AP CASE REPORT: NORMAL
N GONORRHOEA DNA SPEC QL NAA+PROBE: NEGATIVE
PATH REPORT.FINAL DX SPEC: NORMAL
PATH REPORT.GROSS SPEC: NORMAL

## 2018-09-25 ENCOUNTER — HOSPITAL ENCOUNTER (EMERGENCY)
Facility: HOSPITAL | Age: 28
Discharge: HOME | End: 2018-09-25
Attending: EMERGENCY MEDICINE | Admitting: EMERGENCY MEDICINE

## 2018-09-25 VITALS
HEART RATE: 99 BPM | TEMPERATURE: 96.5 F | HEIGHT: 60 IN | WEIGHT: 115 LBS | BODY MASS INDEX: 22.58 KG/M2 | DIASTOLIC BLOOD PRESSURE: 76 MMHG | OXYGEN SATURATION: 98 % | SYSTOLIC BLOOD PRESSURE: 122 MMHG | RESPIRATION RATE: 17 BRPM

## 2018-09-25 DIAGNOSIS — N75.1 BARTHOLIN'S GLAND ABSCESS: Primary | ICD-10-CM

## 2018-09-25 PROCEDURE — 99283 EMERGENCY DEPT VISIT LOW MDM: CPT

## 2018-09-25 RX ORDER — OXYCODONE HYDROCHLORIDE AND ACETAMINOPHEN 5; 325 MG/1; MG/1
1 TABLET ORAL ONCE
Status: COMPLETED | OUTPATIENT
Start: 2018-09-25 | End: 2018-09-25

## 2018-09-25 RX ORDER — HYDROCODONE BITARTRATE AND ACETAMINOPHEN 5; 325 MG/1; MG/1
1 TABLET ORAL EVERY 6 HOURS PRN
Qty: 8 TABLET | Refills: 0 | Status: SHIPPED | OUTPATIENT
Start: 2018-09-25 | End: 2021-06-09

## 2018-09-25 RX ORDER — DIAZEPAM 5 MG/1
5 TABLET ORAL ONCE
Status: COMPLETED | OUTPATIENT
Start: 2018-09-25 | End: 2018-09-25

## 2018-09-25 RX ORDER — LIDOCAINE HYDROCHLORIDE AND EPINEPHRINE 10; 10 MG/ML; UG/ML
10 INJECTION, SOLUTION INFILTRATION; PERINEURAL ONCE
Status: COMPLETED | OUTPATIENT
Start: 2018-09-25 | End: 2018-09-25

## 2018-09-25 RX ADMIN — DIAZEPAM 5 MG: 5 TABLET ORAL at 16:15

## 2018-09-25 RX ADMIN — LIDOCAINE HYDROCHLORIDE AND EPINEPHRINE 10 ML: 10; 10 INJECTION, SOLUTION INFILTRATION; PERINEURAL at 17:39

## 2018-09-25 RX ADMIN — OXYCODONE HYDROCHLORIDE AND ACETAMINOPHEN 1 TABLET: 5; 325 TABLET ORAL at 16:15

## 2018-09-25 RX ADMIN — LIDOCAINE HYDROCHLORIDE: 20 JELLY TOPICAL at 17:01

## 2018-09-25 NOTE — ED PROVIDER NOTES
MD ATTESTATION NOTE  Pt presents with a Bartholin's gland abscess that has been incised and drained by Earline Chávez (APRN). Pt's conditions has improved and will be discharged with pain medication and antibiotics. Pt will follow up with her OB. Pt understands and agrees with the plan.     On Exam: See Saira (LANDEN) note for PE details     The BROWN and I have discussed this patient's history, physical exam, and treatment plan.  I have reviewed the documentation and personally had a face to face interaction with the patient. I affirm the documentation and agree with the treatment and plan.  The attached note describes my personal findings.    Documentation assistance provided by cheo Hernandez for Dr. Zapata.  Information recorded by the maiibvamshi was done at my direction and has been verified and validated by me.     Kristin Hernandez  09/25/18 [id number]       Ariel Zapata MD  09/25/18 4231

## 2018-09-25 NOTE — ED NOTES
Pt had cyst removal surgery by Dr. Mobley last Thursday. She is taking an antibiotic, but states that she is having worsening pain. Was told by Dr. Mobley's office to come here for eval.      Fiorella Wiggins RN  09/25/18 [id number]

## 2018-09-25 NOTE — ED NOTES
Pt attempted to give clean catch urine. Pt unable to at this time.      Rody Dent RN  09/25/18 9435

## 2018-09-25 NOTE — ED PROVIDER NOTES
EMERGENCY DEPARTMENT ENCOUNTER    Room Number:  39/39  Date seen:  9/25/2018  Time seen: 3:51 PM  PCP: Tigre Esquivel MD    HPI:  Chief complaint: vaginal pain  Context:Karissa Roach is a 27 y.o. female who had D&C last Thursday per Dr. Mobley for miscarriage. In the operative note it was mentioned she had a Bartholin's cyst but this had not been addressed preoperatively so no procedure was completed.  He did put her on oral antibiotics.  She presents today with worsening pain of Bartholin's cyst and swelling. She also reports yellow vaginal dishcarge.      Timing: constant  Duration: several days  Location: vagina  Radiation:no  Quality: painful and swelling is increasing  Intensity/Severity: moderate  Associated Symptoms: difficulty sitting  Aggravating Factors: sitting  Alleviating Factors: no  Previous Episodes:no  Treatment before arrival: no    MEDICAL RECORD REVIEW    Jose Mobley MD   Obstetrics      []Manual[]Template  []Copied  Operative Note        Karissa Roach  27 y.o.  1990  female  [id number]        9/20/2018     Surgeon(s) and Role:     * Jose Mobley MD - Primary      Pre-op Diagnosis:   Miscarriage [O03.9]     Post-Op Diagnosis Codes:     * Miscarriage [O03.9]     Post-operative Diagnosis: Same  Surgeon= Leandra                   Assist= NA  Findings/Complications:  Minor amount of old products of conception obtained     Description of procedure:  Procedure(s) and Anesthesia Type:     * DILATATION AND CURETTAGE WITH SUCTION - General     The patient was brought to the OR with IV running and general anesthesia administered and she was placed in the dorsal lithotomy position.  She was prepped and draped usual manner for vaginal procedure.  Examination under anesthesia revealed a normal-size anteverted uterus with no adnexal masses.  There was a 2-3 cm right Bartholin's cyst present and this will be followed up in the office as the patient did not discuss having any surgery done on this  prior to today.  A weighted speculum was placed in the vagina and a Adams tenaculum on the anterior lip of the cervix and the endocervical canal was carefully dilated.  A suction curettage was then performed with a minor amount of old clots and products of conception obtained and sent to pathology.  Examination after this with a large sharp curette revealed the endometrial canal to be totally empty and a resection and also obtained no additional tissue.  All instruments were then removed with minimal blood loss and all counts correct and no complications and the patient was transferred to the recovery room in satisfactory condition.  She was ordered RhoGam postop due to her Rh- status.     Keflex 500 mg QID sent to pharmacy for Bartholin cyst in case getting infected           ALLERGIES  Patient has no known allergies.    PAST MEDICAL HISTORY  Active Ambulatory Problems     Diagnosis Date Noted   • No Active Ambulatory Problems     Resolved Ambulatory Problems     Diagnosis Date Noted   • No Resolved Ambulatory Problems     Past Medical History:   Diagnosis Date   • Anemia        PAST SURGICAL HISTORY  Past Surgical History:   Procedure Laterality Date   • ABDOMINAL SURGERY     • D&C WITH SUCTION N/A 9/20/2018    Procedure: DILATATION AND CURETTAGE WITH SUCTION;  Surgeon: Jose Mobley MD;  Location: [facility];  Service: Obstetrics/Gynecology   • OTHER SURGICAL HISTORY      removal of endometriosis in 2014       FAMILY HISTORY  Family History   Problem Relation Age of Onset   • Malig Hyperthermia Neg Hx        SOCIAL HISTORY  Social History     Social History   • Marital status: Single     Spouse name: N/A   • Number of children: N/A   • Years of education: N/A     Occupational History   • Not on file.     Social History Main Topics   • Smoking status: Current Every Day Smoker   • Smokeless tobacco: Never Used   • Alcohol use Yes      Comment: socially   • Drug use: No   • Sexual activity: Defer     Other  Topics Concern   • Not on file     Social History Narrative   • No narrative on file       REVIEW OF SYSTEMS  Review of Systems    PHYSICAL EXAM  ED Triage Vitals   Temp Heart Rate Resp BP SpO2   09/25/18 1535 09/25/18 1537 09/25/18 1537 09/25/18 1538 09/25/18 1537   96.5 °F (35.8 °C) (!) 129 20 124/84 98 %      Temp src Heart Rate Source Patient Position BP Location FiO2 (%)   09/25/18 1535 09/25/18 1538 09/25/18 1538 09/25/18 1538 --   Tympanic Monitor Sitting Right arm      Physical Exam    LAB RESULTS  No results found for this or any previous visit (from the past 24 hour(s)).    I ordered the above labs and reviewed the results    MEDICATIONS GIVEN IN ER  Medications   lidocaine-EPINEPHrine (XYLOCAINE W/EPI) 1 %-1:528550 injection 10 mL (not administered)   lidocaine (XYLOCAINE) 2 % jelly 30 mL (not administered)   diazePAM (VALIUM) tablet 5 mg (5 mg Oral Given 9/25/18 1615)   oxyCODONE-acetaminophen (PERCOCET) 5-325 MG per tablet 1 tablet (1 tablet Oral Given 9/25/18 1615)       EKG  Interpreted by ED Physician    PROCEDURES  Incision & Drainage  Date/Time: 9/25/2018 5:38 PM  Performed by: ADRIAN KIMBROUGH  Authorized by: FELICIA HUANG     Consent:     Consent obtained:  Verbal    Consent given by:  Patient    Risks discussed:  Pain    Alternatives discussed:  Delayed treatment  Location:     Type:  Bartholin cyst  Pre-procedure details:     Skin preparation:  Chloraprep  Sedation:     Sedation type:  Anxiolysis  Anesthesia (see MAR for exact dosages):     Anesthesia method:  Topical application and local infiltration    Topical anesthetic:  Lidocaine gel    Local anesthetic:  Lidocaine 1% WITH epi  Procedure type:     Complexity:  Simple  Procedure details:     Scalpel blade:  11    Wound management:  Probed and deloculated    Drainage:  Bloody and purulent    Drainage amount:  Copious    Wound treatment:  Wound left open    Packing materials:  None  Post-procedure details:     Patient tolerance of  "procedure:  Tolerated well, no immediate complications        COURSE & MEDICAL DECISION MAKING  Pertinent Labs and Imaging studies that were ordered and reviewed are noted above.  Results were reviewed/discussed with the patient and they were also made aware of online access.  Pt also made aware that some labs, such as cultures, will not be resulted during ER visit and follow up with PMD is necessary.     PROGRESS AND CONSULTS    Progress Notes:    ED Course as of Sep 25 1737   Tue Sep 25, 2018   1541 Recent D&C last week for miscarriage.  He noted Bartholin's cyst but it was not discussed preop.  He did put her on abx after, but she states area is larger and more inflamed and very painful.   [EP]      ED Course User Index  [EP] Earline Chávez, APRN       1558:Reviewed pt's history and workup with Dr. Zapata.  After a bedside evaluation, Dr. Zapata agrees with the plan of care.    1740: The patient's history, physical exam, and lab findings were discussed with the physician, who also performed a face to face history and physical exam.  I discussed all results and noted any abnormalities with patient.  Discussed absoute need to recheck abnormalities with their family physician.  I answered any of the patient's questions.  Discussed plan for discharge, as there is no emergent indication for admission.  Pt is agreeable and understands need for follow up and repeat testing.  Pt is aware that discharge does not mean that nothing is wrong but it indicates no emergency is present and they must continue care with their family physician.  Pt is discharged with instructions to follow up with primary care doctor to have their blood pressure rechecked.         Disposition vitals:  /84 (BP Location: Right arm, Patient Position: Sitting)   Pulse 107   Temp 96.5 °F (35.8 °C) (Tympanic)   Resp 17   Ht 152.4 cm (60\")   Wt 52.2 kg (115 lb)   LMP  (LMP Unknown)   SpO2 98%   BMI 22.46 kg/m²       DIAGNOSIS  Final " diagnoses:   Bartholin's gland abscess, right labia       FOLLOW UP   Jose Mobley MD  [address]  [place] [id number] [phone]      Keep appointment next Thursday as scheduled      RX     Medication List      New Prescriptions    HYDROcodone-acetaminophen 5-325 MG per tablet  Commonly known as:  NORCO  Take 1 tablet by mouth Every 6 (Six) Hours As Needed for Moderate Pain .        Stop    cephalexin 500 MG capsule  Commonly known as:  KEFLEX          Johnny Report  Johnny report [id number] reviewed.   After examining the available clinical information and risks of prescribing controlled substances (including non-treatment or other adjunct treatments), it is considered medically appropriate that a controlled medication be prescribed.  I discussed risks/benefits/alternatives of using a controlled substance including risk of tolerance and dependence.  I discussed with patient (and family if applicable) that the patient should not drive, operate machinery, or otherwise engage in risky behavior as this medication may impair judgment and/or motor capabilities. I discussed that the patient will receive only a short-term controlled substance prescription for management of acute symptoms and that any additional medication needs should be addressed by the primary care provider or appropriate specialist.               Earline Chávez, APRN  09/25/18 173

## 2018-09-28 ENCOUNTER — HOSPITAL ENCOUNTER (EMERGENCY)
Facility: HOSPITAL | Age: 28
Discharge: HOME | End: 2018-09-28
Admitting: EMERGENCY MEDICINE

## 2018-09-28 VITALS
WEIGHT: 117 LBS | OXYGEN SATURATION: 100 % | SYSTOLIC BLOOD PRESSURE: 122 MMHG | HEIGHT: 62 IN | RESPIRATION RATE: 16 BRPM | TEMPERATURE: 97.6 F | HEART RATE: 85 BPM | DIASTOLIC BLOOD PRESSURE: 78 MMHG | BODY MASS INDEX: 21.53 KG/M2

## 2018-09-28 DIAGNOSIS — N75.1 BARTHOLIN'S GLAND ABSCESS: Primary | ICD-10-CM

## 2018-09-28 LAB
BASOPHILS # BLD AUTO: 0.03 10*3/MM3 (ref 0–0.2)
BASOPHILS NFR BLD AUTO: 0.2 % (ref 0–1.5)
DEPRECATED RDW RBC AUTO: 45.9 FL (ref 37–54)
EOSINOPHIL # BLD AUTO: 0.15 10*3/MM3 (ref 0–0.7)
EOSINOPHIL NFR BLD AUTO: 0.8 % (ref 0.3–6.2)
ERYTHROCYTE [DISTWIDTH] IN BLOOD BY AUTOMATED COUNT: 12.9 % (ref 11.7–13)
HCT VFR BLD AUTO: 42.4 % (ref 35.6–45.5)
HGB BLD-MCNC: 13.9 G/DL (ref 11.9–15.5)
IMM GRANULOCYTES # BLD: 0.03 10*3/MM3 (ref 0–0.03)
IMM GRANULOCYTES NFR BLD: 0.2 % (ref 0–0.5)
LYMPHOCYTES # BLD AUTO: 2.81 10*3/MM3 (ref 0.9–4.8)
LYMPHOCYTES NFR BLD AUTO: 15.6 % (ref 19.6–45.3)
MCH RBC QN AUTO: 32.2 PG (ref 26.9–32)
MCHC RBC AUTO-ENTMCNC: 32.8 G/DL (ref 32.4–36.3)
MCV RBC AUTO: 98.1 FL (ref 80.5–98.2)
MONOCYTES # BLD AUTO: 1.27 10*3/MM3 (ref 0.2–1.2)
MONOCYTES NFR BLD AUTO: 7 % (ref 5–12)
NEUTROPHILS # BLD AUTO: 13.76 10*3/MM3 (ref 1.9–8.1)
NEUTROPHILS NFR BLD AUTO: 76.2 % (ref 42.7–76)
PLATELET # BLD AUTO: 367 10*3/MM3 (ref 140–500)
PMV BLD AUTO: 9.8 FL (ref 6–12)
RBC # BLD AUTO: 4.32 10*6/MM3 (ref 3.9–5.2)
WBC NRBC COR # BLD: 18.05 10*3/MM3 (ref 4.5–10.7)

## 2018-09-28 PROCEDURE — 99283 EMERGENCY DEPT VISIT LOW MDM: CPT

## 2018-09-28 PROCEDURE — 25010000002 MORPHINE PER 10 MG: Performed by: EMERGENCY MEDICINE

## 2018-09-28 PROCEDURE — 96361 HYDRATE IV INFUSION ADD-ON: CPT

## 2018-09-28 PROCEDURE — 25010000002 ONDANSETRON PER 1 MG: Performed by: PHYSICIAN ASSISTANT

## 2018-09-28 PROCEDURE — 96374 THER/PROPH/DIAG INJ IV PUSH: CPT

## 2018-09-28 PROCEDURE — 96375 TX/PRO/DX INJ NEW DRUG ADDON: CPT

## 2018-09-28 PROCEDURE — 85025 COMPLETE CBC W/AUTO DIFF WBC: CPT | Performed by: PHYSICIAN ASSISTANT

## 2018-09-28 RX ORDER — LIDOCAINE HYDROCHLORIDE AND EPINEPHRINE 10; 10 MG/ML; UG/ML
10 INJECTION, SOLUTION INFILTRATION; PERINEURAL ONCE
Status: COMPLETED | OUTPATIENT
Start: 2018-09-28 | End: 2018-09-28

## 2018-09-28 RX ORDER — OXYCODONE HYDROCHLORIDE AND ACETAMINOPHEN 5; 325 MG/1; MG/1
1 TABLET ORAL ONCE
Status: DISCONTINUED | OUTPATIENT
Start: 2018-09-28 | End: 2018-09-28

## 2018-09-28 RX ORDER — SULFAMETHOXAZOLE AND TRIMETHOPRIM 400; 80 MG/1; MG/1
1 TABLET ORAL 2 TIMES DAILY
Qty: 10 TABLET | Refills: 0 | Status: SHIPPED | OUTPATIENT
Start: 2018-09-28 | End: 2018-10-03

## 2018-09-28 RX ORDER — ONDANSETRON 2 MG/ML
4 INJECTION INTRAMUSCULAR; INTRAVENOUS ONCE
Status: COMPLETED | OUTPATIENT
Start: 2018-09-28 | End: 2018-09-28

## 2018-09-28 RX ORDER — HYDROCODONE BITARTRATE AND ACETAMINOPHEN 5; 325 MG/1; MG/1
1 TABLET ORAL EVERY 8 HOURS PRN
Qty: 5 TABLET | Refills: 0 | Status: SHIPPED | OUTPATIENT
Start: 2018-09-28 | End: 2021-06-09

## 2018-09-28 RX ADMIN — MORPHINE SULFATE 4 MG: 4 INJECTION INTRAVENOUS at 21:54

## 2018-09-28 RX ADMIN — ONDANSETRON 4 MG: 2 INJECTION INTRAMUSCULAR; INTRAVENOUS at 21:36

## 2018-09-28 RX ADMIN — LIDOCAINE HYDROCHLORIDE AND EPINEPHRINE 10 ML: 10; 10 INJECTION, SOLUTION INFILTRATION; PERINEURAL at 21:56

## 2018-09-28 RX ADMIN — SODIUM CHLORIDE 1000 ML: 9 INJECTION, SOLUTION INTRAVENOUS at 21:38

## 2018-10-08 ENCOUNTER — OFFICE VISIT (OUTPATIENT)
Dept: OBSTETRICS AND GYNECOLOGY | Facility: CLINIC | Age: 28
End: 2018-10-08

## 2018-10-08 VITALS
WEIGHT: 112.2 LBS | SYSTOLIC BLOOD PRESSURE: 118 MMHG | DIASTOLIC BLOOD PRESSURE: 64 MMHG | BODY MASS INDEX: 20.65 KG/M2 | HEIGHT: 62 IN

## 2018-10-08 DIAGNOSIS — Z09 POSTOP CHECK: Primary | ICD-10-CM

## 2018-10-08 PROCEDURE — 99024 POSTOP FOLLOW-UP VISIT: CPT | Performed by: OBSTETRICS & GYNECOLOGY

## 2018-10-08 RX ORDER — NORGESTIMATE AND ETHINYL ESTRADIOL 7DAYSX3 28
1 KIT ORAL DAILY
Qty: 28 TABLET | Refills: 12
Start: 2018-10-08 | End: 2019-10-08

## 2018-10-08 NOTE — PROGRESS NOTES
"Subjective    CC follow-up from D&C for miscarriage, and subsequently seen in the emergency room with I&D of right Bartholin abscess.   History of Present Illness    Karissa Roach is a 27 y.o. female who presents for postop check from suction D&C for miscarriage.  Also seen subsequently by emergency room and right Bartholin's abscess drained.  Patient actually had to go to the ER twice with a catheter placed in the abscess second time and it is now doing very well on antibiotics.  D&C  Path showed POC.      Obstetric History:  OB History      Para Term  AB Living    2         1    SAB TAB Ectopic Molar Multiple Live Births                        Menstrual History:     No LMP recorded (lmp unknown). Patient is not currently having periods (Reason: Other).       Past Medical History:   Diagnosis Date   • Anemia      Family History   Problem Relation Age of Onset   • Malig Hyperthermia Neg Hx        The following portions of the patient's history were reviewed and updated as appropriate: allergies, current medications, past family history, past medical history, past social history, past surgical history and problem list.    Review of Systems  As per HPI        Objective   Physical Exam  Vagina without blood.  Cervix closed.  Small blood blister drained with a scalpel and disappeared.  Bimanual exam negative with normal size nontender uterus.  Pap performed.  /64   Ht 157.5 cm (62\")   Wt 50.9 kg (112 lb 3.2 oz)   LMP  (LMP Unknown)   Breastfeeding? No   BMI 20.52 kg/m²     Assessment/Plan   Karissa was seen today for post-op.    Diagnoses and all orders for this visit:    Postop check  -     IGP,rfx Aptima HPV All Pth    Other orders  -     norgestimate-ethinyl estradiol (ORTHO TRI-CYCLEN, 28,) 0.18/0.215/0.25 MG-35 MCG per tablet; Take 1 tablet by mouth Daily.        RTO 1 year                "

## 2018-10-10 LAB
CONV .: NORMAL
CYTOLOGIST CVX/VAG CYTO: NORMAL
CYTOLOGY CVX/VAG DOC THIN PREP: NORMAL
DX ICD CODE: NORMAL
HIV 1 & 2 AB SER-IMP: NORMAL
OTHER STN SPEC: NORMAL
PATH REPORT.FINAL DX SPEC: NORMAL
STAT OF ADQ CVX/VAG CYTO-IMP: NORMAL

## 2018-12-19 ENCOUNTER — OFFICE VISIT (OUTPATIENT)
Dept: OBSTETRICS AND GYNECOLOGY | Facility: CLINIC | Age: 28
End: 2018-12-19

## 2018-12-19 VITALS
WEIGHT: 113.8 LBS | HEIGHT: 61 IN | DIASTOLIC BLOOD PRESSURE: 64 MMHG | BODY MASS INDEX: 21.49 KG/M2 | SYSTOLIC BLOOD PRESSURE: 112 MMHG

## 2018-12-19 DIAGNOSIS — N90.89 VULVAR LESION: ICD-10-CM

## 2018-12-19 DIAGNOSIS — N76.4 VULVAR ABSCESS: Primary | ICD-10-CM

## 2018-12-19 PROCEDURE — 99213 OFFICE O/P EST LOW 20 MIN: CPT | Performed by: OBSTETRICS & GYNECOLOGY

## 2018-12-19 RX ORDER — CEPHALEXIN 500 MG/1
500 CAPSULE ORAL 3 TIMES DAILY
Qty: 30 CAPSULE | Refills: 1
Start: 2018-12-19 | End: 2021-06-09

## 2018-12-19 NOTE — PROGRESS NOTES
"Subjective    Chief Complaint   Patient presents with   • Follow-up     vaag cyst drainage severe pain swollen and red      History of Present Illness    Karissa Roach is a 28 y.o. female who presents for draining left labial abscess which started 2 weeks ago and is extremely painful.  She has not taken any antibiotics.  This is her fourth episode of this.  She feels it is gone greatly down from when it started.    Obstetric History:  OB History      Para Term  AB Living    2         1    SAB TAB Ectopic Molar Multiple Live Births                        Menstrual History:     No LMP recorded. Patient is not currently having periods (Reason: Other).       Past Medical History:   Diagnosis Date   • Anemia      Family History   Problem Relation Age of Onset   • Malig Hyperthermia Neg Hx        The following portions of the patient's history were reviewed and updated as appropriate: allergies, current medications, past medical history, past surgical history and problem list.    Review of Systems  As per HPI        Objective   Physical Exam  Small draining abscess left labia which is almost totally deflated.  There is an ulcer they are and also at the opening to the vagina which could be due to herpes.  Culture was performed.  Patient is going to continue her sitz baths and I will place her on Keflex.  /64   Ht 154.9 cm (61\")   Wt 51.6 kg (113 lb 12.8 oz)   BMI 21.50 kg/m²     Assessment/Plan   Karissa was seen today for follow-up.    Diagnoses and all orders for this visit:    Vulvar abscess    Vulvar lesion  -     Herpes Simplex Virus Culture - Swab, Vagina    Other orders  -     cephalexin (KEFLEX) 500 MG capsule; Take 1 capsule by mouth 3 (Three) Times a Day.        RTO 1 week     15 minute visit today of which 10 minutes was face-to-face counseling concerning the treatment for vulvar abscess that is recurrent including possible marsupialization if caught early enough in the process.  Also " discussed herpes simplex virus and reason for the culture.  Patient will continue sitz baths and will return in 1 week for a follow-up visit.

## 2018-12-21 LAB — HSV SPEC CULT: POSITIVE

## 2018-12-26 ENCOUNTER — OFFICE VISIT (OUTPATIENT)
Dept: OBSTETRICS AND GYNECOLOGY | Facility: CLINIC | Age: 28
End: 2018-12-26

## 2018-12-26 VITALS
HEIGHT: 61 IN | DIASTOLIC BLOOD PRESSURE: 64 MMHG | SYSTOLIC BLOOD PRESSURE: 112 MMHG | BODY MASS INDEX: 21.34 KG/M2 | WEIGHT: 113 LBS

## 2018-12-26 DIAGNOSIS — A60.00 HERPES SIMPLEX INFECTION OF GENITOURINARY SYSTEM: Primary | ICD-10-CM

## 2018-12-26 PROCEDURE — 99213 OFFICE O/P EST LOW 20 MIN: CPT | Performed by: OBSTETRICS & GYNECOLOGY

## 2018-12-26 RX ORDER — VALACYCLOVIR HYDROCHLORIDE 1 G/1
1000 TABLET, FILM COATED ORAL 2 TIMES DAILY
Qty: 20 TABLET | Refills: 0
Start: 2018-12-26 | End: 2019-01-05

## 2018-12-26 RX ORDER — IBUPROFEN 800 MG/1
800 TABLET ORAL EVERY 8 HOURS PRN
Qty: 30 TABLET | Refills: 2
Start: 2018-12-26 | End: 2021-06-09

## 2019-01-28 ENCOUNTER — TELEPHONE (OUTPATIENT)
Dept: OBSTETRICS AND GYNECOLOGY | Facility: CLINIC | Age: 29
End: 2019-01-28

## 2019-04-01 RX ORDER — VALACYCLOVIR HYDROCHLORIDE 1 G/1
TABLET, FILM COATED ORAL
Qty: 20 TABLET | Refills: 0 | Status: SHIPPED | OUTPATIENT
Start: 2019-04-01 | End: 2021-06-09

## 2021-05-14 ENCOUNTER — APPOINTMENT (OUTPATIENT)
Dept: CT IMAGING | Facility: HOSPITAL | Age: 31
End: 2021-05-14

## 2021-05-14 ENCOUNTER — HOSPITAL ENCOUNTER (EMERGENCY)
Facility: HOSPITAL | Age: 31
Discharge: HOME | End: 2021-05-14
Attending: EMERGENCY MEDICINE | Admitting: EMERGENCY MEDICINE

## 2021-05-14 VITALS
SYSTOLIC BLOOD PRESSURE: 129 MMHG | OXYGEN SATURATION: 98 % | HEART RATE: 87 BPM | DIASTOLIC BLOOD PRESSURE: 91 MMHG | BODY MASS INDEX: 30.21 KG/M2 | RESPIRATION RATE: 16 BRPM | TEMPERATURE: 97.8 F | HEIGHT: 61 IN | WEIGHT: 160 LBS

## 2021-05-14 DIAGNOSIS — K59.00 CONSTIPATION, UNSPECIFIED CONSTIPATION TYPE: ICD-10-CM

## 2021-05-14 DIAGNOSIS — R10.84 GENERALIZED ABDOMINAL PAIN: Primary | ICD-10-CM

## 2021-05-14 LAB
ALBUMIN SERPL-MCNC: 4.5 G/DL (ref 3.5–5.2)
ALBUMIN/GLOB SERPL: 1.4 G/DL
ALP SERPL-CCNC: 70 U/L (ref 39–117)
ALT SERPL W P-5'-P-CCNC: 21 U/L (ref 1–33)
ANION GAP SERPL CALCULATED.3IONS-SCNC: 9.4 MMOL/L (ref 5–15)
AST SERPL-CCNC: 19 U/L (ref 1–32)
BACTERIA UR QL AUTO: ABNORMAL /HPF
BASOPHILS # BLD AUTO: 0.04 10*3/MM3 (ref 0–0.2)
BASOPHILS NFR BLD AUTO: 0.3 % (ref 0–1.5)
BILIRUB SERPL-MCNC: <0.2 MG/DL (ref 0–1.2)
BILIRUB UR QL STRIP: NEGATIVE
BUN SERPL-MCNC: 7 MG/DL (ref 6–20)
BUN/CREAT SERPL: 9.7 (ref 7–25)
CALCIUM SPEC-SCNC: 9.2 MG/DL (ref 8.6–10.5)
CHLORIDE SERPL-SCNC: 106 MMOL/L (ref 98–107)
CLARITY UR: CLEAR
CO2 SERPL-SCNC: 24.6 MMOL/L (ref 22–29)
COLOR UR: YELLOW
CREAT SERPL-MCNC: 0.72 MG/DL (ref 0.57–1)
DEPRECATED RDW RBC AUTO: 42.4 FL (ref 37–54)
EOSINOPHIL # BLD AUTO: 0.14 10*3/MM3 (ref 0–0.4)
EOSINOPHIL NFR BLD AUTO: 1.2 % (ref 0.3–6.2)
ERYTHROCYTE [DISTWIDTH] IN BLOOD BY AUTOMATED COUNT: 12.1 % (ref 12.3–15.4)
GFR SERPL CREATININE-BSD FRML MDRD: 96 ML/MIN/1.73
GLOBULIN UR ELPH-MCNC: 3.2 GM/DL
GLUCOSE SERPL-MCNC: 97 MG/DL (ref 65–99)
GLUCOSE UR STRIP-MCNC: NEGATIVE MG/DL
HCG SERPL QL: NEGATIVE
HCT VFR BLD AUTO: 43.2 % (ref 34–46.6)
HGB BLD-MCNC: 14.9 G/DL (ref 12–15.9)
HGB UR QL STRIP.AUTO: NEGATIVE
HOLD SPECIMEN: NORMAL
HYALINE CASTS UR QL AUTO: ABNORMAL /LPF
IMM GRANULOCYTES # BLD AUTO: 0.05 10*3/MM3 (ref 0–0.05)
IMM GRANULOCYTES NFR BLD AUTO: 0.4 % (ref 0–0.5)
KETONES UR QL STRIP: NEGATIVE
LEUKOCYTE ESTERASE UR QL STRIP.AUTO: ABNORMAL
LIPASE SERPL-CCNC: 14 U/L (ref 13–60)
LYMPHOCYTES # BLD AUTO: 3.26 10*3/MM3 (ref 0.7–3.1)
LYMPHOCYTES NFR BLD AUTO: 27.1 % (ref 19.6–45.3)
MCH RBC QN AUTO: 32.7 PG (ref 26.6–33)
MCHC RBC AUTO-ENTMCNC: 34.5 G/DL (ref 31.5–35.7)
MCV RBC AUTO: 94.7 FL (ref 79–97)
MONOCYTES # BLD AUTO: 0.89 10*3/MM3 (ref 0.1–0.9)
MONOCYTES NFR BLD AUTO: 7.4 % (ref 5–12)
NEUTROPHILS NFR BLD AUTO: 63.6 % (ref 42.7–76)
NEUTROPHILS NFR BLD AUTO: 7.67 10*3/MM3 (ref 1.7–7)
NITRITE UR QL STRIP: NEGATIVE
NRBC BLD AUTO-RTO: 0 /100 WBC (ref 0–0.2)
PH UR STRIP.AUTO: 5.5 [PH] (ref 5–8)
PLATELET # BLD AUTO: 347 10*3/MM3 (ref 140–450)
PMV BLD AUTO: 10.2 FL (ref 6–12)
POTASSIUM SERPL-SCNC: 3.9 MMOL/L (ref 3.5–5.2)
PROT SERPL-MCNC: 7.7 G/DL (ref 6–8.5)
PROT UR QL STRIP: NEGATIVE
RBC # BLD AUTO: 4.56 10*6/MM3 (ref 3.77–5.28)
RBC # UR: ABNORMAL /HPF
REF LAB TEST METHOD: ABNORMAL
SODIUM SERPL-SCNC: 140 MMOL/L (ref 136–145)
SP GR UR STRIP: 1.01 (ref 1–1.03)
SQUAMOUS #/AREA URNS HPF: ABNORMAL /HPF
UROBILINOGEN UR QL STRIP: ABNORMAL
WBC # BLD AUTO: 12.05 10*3/MM3 (ref 3.4–10.8)
WBC UR QL AUTO: ABNORMAL /HPF
WHOLE BLOOD HOLD SPECIMEN: NORMAL

## 2021-05-14 PROCEDURE — 83690 ASSAY OF LIPASE: CPT

## 2021-05-14 PROCEDURE — 25010000002 IOPAMIDOL 61 % SOLUTION: Performed by: EMERGENCY MEDICINE

## 2021-05-14 PROCEDURE — 74177 CT ABD & PELVIS W/CONTRAST: CPT

## 2021-05-14 PROCEDURE — 85025 COMPLETE CBC W/AUTO DIFF WBC: CPT

## 2021-05-14 PROCEDURE — 84703 CHORIONIC GONADOTROPIN ASSAY: CPT

## 2021-05-14 PROCEDURE — 81001 URINALYSIS AUTO W/SCOPE: CPT

## 2021-05-14 PROCEDURE — 80053 COMPREHEN METABOLIC PANEL: CPT

## 2021-05-14 PROCEDURE — 99283 EMERGENCY DEPT VISIT LOW MDM: CPT

## 2021-05-14 RX ORDER — POLYETHYLENE GLYCOL 3350 17 G/17G
17 POWDER, FOR SOLUTION ORAL DAILY
Qty: 30 EACH | Refills: 0 | Status: SHIPPED | OUTPATIENT
Start: 2021-05-14

## 2021-05-14 RX ORDER — SODIUM CHLORIDE 0.9 % (FLUSH) 0.9 %
10 SYRINGE (ML) INJECTION AS NEEDED
Status: DISCONTINUED | OUTPATIENT
Start: 2021-05-14 | End: 2021-05-14 | Stop reason: HOSPADM

## 2021-05-14 RX ADMIN — IOPAMIDOL 85 ML: 612 INJECTION, SOLUTION INTRAVENOUS at 15:36

## 2021-05-14 RX ADMIN — SODIUM CHLORIDE 1000 ML: 9 INJECTION, SOLUTION INTRAVENOUS at 13:55

## 2021-06-09 ENCOUNTER — OFFICE VISIT (OUTPATIENT)
Dept: OBSTETRICS AND GYNECOLOGY | Facility: CLINIC | Age: 31
End: 2021-06-09

## 2021-06-09 VITALS
HEIGHT: 61 IN | WEIGHT: 168 LBS | BODY MASS INDEX: 31.72 KG/M2 | SYSTOLIC BLOOD PRESSURE: 124 MMHG | DIASTOLIC BLOOD PRESSURE: 82 MMHG

## 2021-06-09 DIAGNOSIS — N91.2 AMENORRHEA: ICD-10-CM

## 2021-06-09 DIAGNOSIS — N63.20 LEFT BREAST MASS: ICD-10-CM

## 2021-06-09 DIAGNOSIS — R39.9 URINARY TRACT INFECTION SYMPTOMS: ICD-10-CM

## 2021-06-09 DIAGNOSIS — Z01.411 ENCOUNTER FOR GYNECOLOGICAL EXAMINATION WITH ABNORMAL FINDING: Primary | ICD-10-CM

## 2021-06-09 DIAGNOSIS — N89.8 VAGINAL DISCHARGE: ICD-10-CM

## 2021-06-09 PROCEDURE — 3008F BODY MASS INDEX DOCD: CPT | Performed by: OBSTETRICS & GYNECOLOGY

## 2021-06-09 PROCEDURE — 2014F MENTAL STATUS ASSESS: CPT | Performed by: OBSTETRICS & GYNECOLOGY

## 2021-06-09 PROCEDURE — 99213 OFFICE O/P EST LOW 20 MIN: CPT | Performed by: OBSTETRICS & GYNECOLOGY

## 2021-06-09 PROCEDURE — 99395 PREV VISIT EST AGE 18-39: CPT | Performed by: OBSTETRICS & GYNECOLOGY

## 2021-06-09 RX ORDER — CITALOPRAM 20 MG/1
20 TABLET ORAL DAILY
COMMUNITY
Start: 2021-01-13

## 2021-06-09 RX ORDER — MEDROXYPROGESTERONE ACETATE 10 MG/1
10 TABLET ORAL DAILY
Qty: 10 TABLET | Refills: 0 | Status: SHIPPED | OUTPATIENT
Start: 2021-06-09 | End: 2021-06-19

## 2021-06-09 NOTE — PROGRESS NOTES
Subjective    Chief Complaint   Patient presents with   • Gynecologic Exam     AE, Pt states having issues       History of Present Illness    Karissa Roahc is a 30 y.o. female who presents for annual exam.Smoker.  Discussed smoking cessation and not interested in stopping.  Patient has been in rehab for couple years and is now clean.  She has not had a period couple months and not sexually active for 6 months.  Recent hCG negative and recent thyroid tests and prolactin normal.  She did have a CT scan in May 2021 which was normal following ovarian cysts seen at [facility] in March on ultrasound.  She also has had some UTI symptoms and issues with vaginal discharge.  Patient also had in November a left breast biopsy which she feels is benign but we need to get a hold of the path report.  She is supposed to have a follow-up mammogram and left breast ultrasound in 6 months and she is going to contact [facility].    Obstetric History:  OB History        2    Para        Term                AB        Living   1       SAB        TAB        Ectopic        Molar        Multiple        Live Births                   Menstrual History:     No LMP recorded. (Menstrual status: Other).       Past Medical History:   Diagnosis Date   • Anemia      Family History   Problem Relation Age of Onset   • Malig Hyperthermia Neg Hx      Social History     Tobacco Use   Smoking Status Current Every Day Smoker   • Packs/day: 1.00   Smokeless Tobacco Never Used     Karissa Roach  reports that she has been smoking. She has been smoking about 1.00 pack per day. She has never used smokeless tobacco..     I advised her to quit and she is not willing to quit.             I advised her to quit and she is not willing to quit.               The following portions of the patient's history were reviewed and updated as appropriate: allergies, current medications, past family history, past medical history, past  social history, past surgical history and problem list.    Review of Systems   Constitutional: Negative.  Negative for fever and unexpected weight change.   HENT: Negative.    Respiratory: Negative for shortness of breath and wheezing.    Cardiovascular: Negative for chest pain, palpitations and leg swelling.   Gastrointestinal: Negative for abdominal pain, anal bleeding and blood in stool.   Genitourinary: Positive for dysuria, menstrual problem and vaginal discharge. Negative for pelvic pain, urgency, vaginal bleeding and vaginal pain.   Skin: Negative.    Neurological: Negative.    Hematological: Negative.  Negative for adenopathy.   Psychiatric/Behavioral: Negative.  Negative for dysphoric mood. The patient is not nervous/anxious.             Objective   Physical Exam  Exam conducted with a chaperone present.   Constitutional:       Appearance: Normal appearance. She is well-developed.   HENT:      Head: Normocephalic.   Neck:      Thyroid: No thyromegaly.      Trachea: Trachea normal. No tracheal deviation.   Cardiovascular:      Rate and Rhythm: Normal rate and regular rhythm.      Heart sounds: Normal heart sounds. No murmur heard.     Pulmonary:      Effort: Pulmonary effort is normal.      Breath sounds: Normal breath sounds.   Chest:      Breasts:         Right: Normal. No mass, nipple discharge or tenderness.         Left: Normal. No mass, nipple discharge or tenderness.          Comments: 1 cm left breast nodule which patient states is where she had the breast biopsy performed.  Abdominal:      Palpations: Abdomen is soft. There is no mass.      Tenderness: There is no abdominal tenderness.      Hernia: No hernia is present.   Genitourinary:     General: Normal vulva.      Labia:         Right: No tenderness or lesion.         Left: No tenderness or lesion.       Urethra: No prolapse or urethral lesion.      Vagina: Vaginal discharge present. No lesions.      Cervix: No cervical motion tenderness.       "Uterus: Not enlarged and not tender.       Adnexa:         Right: No mass or tenderness.          Left: No mass or tenderness.        Rectum: Normal. No external hemorrhoid or internal hemorrhoid. Normal anal tone.      Comments: External genitalia normal   Lymphadenopathy:      Cervical: No cervical adenopathy.      Upper Body:      Right upper body: No axillary adenopathy.      Left upper body: No axillary adenopathy.   Skin:     General: Skin is warm and dry.      Findings: No rash.   Neurological:      Mental Status: She is alert and oriented to person, place, and time.   Psychiatric:         Behavior: Behavior normal.         /82   Ht 154.9 cm (61\")   Wt 76.2 kg (168 lb)   BMI 31.74 kg/m²     Assessment/Plan   Diagnoses and all orders for this visit:    1. Encounter for gynecological examination with abnormal finding (Primary)  -     IGP,rfx Aptima HPV All Pth    2. Left breast mass    3. Amenorrhea    4. Urinary tract infection symptoms  -     Urinalysis With Microscopic - Urine, Clean Catch  -     Urine Culture - Urine, Urine, Clean Catch    5. Vaginal discharge  -     NuSwab VG+ - Swab, Vagina    Other orders  -     medroxyPROGESTERone (Provera) 10 MG tablet; Take 1 tablet by mouth Daily for 10 days.  Dispense: 10 tablet; Refill: 0        Impression and plan.  Patient will call next week for results.  We discussed her probably not ovulating and will give her Provera for 10 days to see if it brings on her menstrual cycle.  We also discussed her symptoms of vaginal discharge and dysuria and will get a urine culture along with a vaginal culture performed.  She is going to call her physician that has been ordering her mammograms and make sure she has a follow-up appointment and we in the meantime we will get a hold of her left breast biopsy pathology.  Patient is happy with this plan.  We also counseled about diet and exercise.  Patient has been discussed smoking cessation by several physicians and has " not wanted to stop as of now.

## 2021-06-11 LAB
APPEARANCE UR: CLEAR
BACTERIA #/AREA URNS HPF: ABNORMAL /[HPF]
BACTERIA UR CULT: NORMAL
BACTERIA UR CULT: NORMAL
BILIRUB UR QL STRIP: NEGATIVE
CASTS URNS QL MICRO: ABNORMAL /LPF
COLOR UR: YELLOW
CONV .: NORMAL
CRYSTALS URNS MICRO: ABNORMAL
CYTOLOGIST CVX/VAG CYTO: NORMAL
CYTOLOGY CVX/VAG DOC CYTO: NORMAL
CYTOLOGY CVX/VAG DOC THIN PREP: NORMAL
DX ICD CODE: NORMAL
EPI CELLS #/AREA URNS HPF: ABNORMAL /HPF (ref 0–10)
GLUCOSE UR QL: NEGATIVE
HGB UR QL STRIP: ABNORMAL
HIV 1 & 2 AB SER-IMP: NORMAL
KETONES UR QL STRIP: NEGATIVE
LEUKOCYTE ESTERASE UR QL STRIP: NEGATIVE
MICRO URNS: ABNORMAL
NITRITE UR QL STRIP: NEGATIVE
OTHER STN SPEC: NORMAL
PH UR STRIP: 6.5 [PH] (ref 5–7.5)
PROT UR QL STRIP: NEGATIVE
RBC #/AREA URNS HPF: ABNORMAL /HPF (ref 0–2)
SP GR UR: 1.01 (ref 1–1.03)
STAT OF ADQ CVX/VAG CYTO-IMP: NORMAL
UNIDENT CRYS URNS QL MICRO: PRESENT
UROBILINOGEN UR STRIP-MCNC: 0.2 MG/DL (ref 0.2–1)
WBC #/AREA URNS HPF: ABNORMAL /HPF (ref 0–5)

## 2021-06-12 LAB
A VAGINAE DNA VAG QL NAA+PROBE: ABNORMAL SCORE
BVAB2 DNA VAG QL NAA+PROBE: ABNORMAL SCORE
C ALBICANS DNA VAG QL NAA+PROBE: NEGATIVE
C GLABRATA DNA VAG QL NAA+PROBE: NEGATIVE
C TRACH DNA VAG QL NAA+PROBE: POSITIVE
MEGA1 DNA VAG QL NAA+PROBE: ABNORMAL SCORE
N GONORRHOEA DNA VAG QL NAA+PROBE: NEGATIVE
T VAGINALIS DNA VAG QL NAA+PROBE: NEGATIVE

## 2021-06-14 ENCOUNTER — TELEPHONE (OUTPATIENT)
Dept: OBSTETRICS AND GYNECOLOGY | Facility: CLINIC | Age: 31
End: 2021-06-14

## 2021-06-14 RX ORDER — METRONIDAZOLE 500 MG/1
500 TABLET ORAL 3 TIMES DAILY
Qty: 15 TABLET | Refills: 0 | Status: SHIPPED | OUTPATIENT
Start: 2021-06-14 | End: 2021-06-19

## 2021-06-14 RX ORDER — AZITHROMYCIN 500 MG/1
TABLET, FILM COATED ORAL
Qty: 2 TABLET | Refills: 1 | Status: SHIPPED | OUTPATIENT
Start: 2021-06-14

## 2021-06-14 NOTE — TELEPHONE ENCOUNTER
Please tell patient that her vaginal culture came back both with bacterial vaginosis and chlamydia.  I am sending her in Flagyl tablets which she should take for 5 days without any alcohol.  I am also sending her in 2 tablets of Zithromax that should take care of her chlamydia.  She does need another culture in 3 months to make sure it is still negative and her partner needs to be checked for chlamydia.  Thank you.  ROGER

## 2023-09-07 LAB — HOLD SPECIMEN: NORMAL

## 2023-11-14 ENCOUNTER — HOSPITAL ENCOUNTER (EMERGENCY)
Facility: HOSPITAL | Age: 33
Discharge: HOME | End: 2023-11-14
Attending: STUDENT IN AN ORGANIZED HEALTH CARE EDUCATION/TRAINING PROGRAM | Admitting: STUDENT IN AN ORGANIZED HEALTH CARE EDUCATION/TRAINING PROGRAM
Payer: COMMERCIAL

## 2023-11-14 VITALS
HEIGHT: 61 IN | TEMPERATURE: 96.5 F | OXYGEN SATURATION: 98 % | SYSTOLIC BLOOD PRESSURE: 138 MMHG | BODY MASS INDEX: 28.32 KG/M2 | RESPIRATION RATE: 20 BRPM | HEART RATE: 88 BPM | DIASTOLIC BLOOD PRESSURE: 95 MMHG | WEIGHT: 150 LBS

## 2023-11-14 DIAGNOSIS — N75.1 BARTHOLIN'S GLAND ABSCESS: Primary | ICD-10-CM

## 2023-11-14 PROCEDURE — 99282 EMERGENCY DEPT VISIT SF MDM: CPT

## 2023-11-14 RX ORDER — SULFAMETHOXAZOLE AND TRIMETHOPRIM 800; 160 MG/1; MG/1
1 TABLET ORAL 2 TIMES DAILY
Qty: 20 TABLET | Refills: 0 | Status: SHIPPED | OUTPATIENT
Start: 2023-11-14 | End: 2023-11-24

## 2023-11-14 NOTE — ED PROVIDER NOTES
MD ATTESTATION NOTE    The BROWN and I have discussed this patient's history, physical exam, and treatment plan.  I have reviewed the documentation and personally had a face to face interaction with the patient. I affirm the documentation and agree with the treatment and plan.  The attached note describes my personal findings.      I provided a substantive portion of the care of the patient.  I personally performed the physical exam in its entirety, and below are my findings.        Brief HPI: 33-year-old female presenting for evaluation of right labial swelling for several days duration.  Patient has a history of Bartholin cyst    PHYSICAL EXAM  ED Triage Vitals   Temp Heart Rate Resp BP SpO2   11/14/23 1258 11/14/23 1258 11/14/23 1258 11/14/23 1300 11/14/23 1258   96.5 °F (35.8 °C) (!) 124 20 130/90 98 %      Temp src Heart Rate Source Patient Position BP Location FiO2 (%)   11/14/23 1258 11/14/23 1258 -- -- --   Tympanic Monitor            GENERAL: no acute distress  HENT: nares patent  EYES: no scleral icterus  CV: regular rhythm, normal rate  RESPIRATORY: normal effort  ABDOMEN: soft  MUSCULOSKELETAL: no deformity  NEURO: alert, moves all extremities, follows commands  PSYCH:  calm, cooperative  SKIN: warm, dry    Vital signs and nursing notes reviewed.        Plan: 33-year-old female presenting for evaluation of Bartholin cyst.  Incision and drainage performed by Rafia LAUGHLIN with copious amounts of purulent material drained.  Patient initiated on antibiotics and counseled to follow-up on outpatient basis.       Don Warren MD  11/14/23 2031

## 2023-11-14 NOTE — ED PROVIDER NOTES
EMERGENCY DEPARTMENT ENCOUNTER    Room Number:  07/07  PCP: Provider, No Known  Discussed/ obtained information from independent historians: patient      HPI:  Chief Complaint: cyst  A complete HPI/ROS/PMH/PSH/SH/FH are unobtainable due to: none  Context: Karissa Roach is a 33 y.o. female who presents to the ED c/o swelling in her right vaginal area for the last few days.  She has a history of recurrent Bartholin cyst, states she had a Word catheter placed by gynecology once or twice which was unsuccessful.  She believes she has had this approximately 5 times in the past and it feels exactly the same as previous.  He denies any fevers chills or other complaints at this time including any abnormal vaginal discharge, abdominal pain or urinary symptoms.      External (non-ED) record review: Office visit with Dr. Mobley of gynecology on 6/9/2021.  NuSwab was positive for chlamydia, she was prescribed Zithromax.    PAST MEDICAL HISTORY  Active Ambulatory Problems     Diagnosis Date Noted    No Active Ambulatory Problems     Resolved Ambulatory Problems     Diagnosis Date Noted    No Resolved Ambulatory Problems     Past Medical History:   Diagnosis Date    Anemia          PAST SURGICAL HISTORY  Past Surgical History:   Procedure Laterality Date    ABDOMINAL SURGERY      D & C WITH SUCTION N/A 9/20/2018    Procedure: DILATATION AND CURETTAGE WITH SUCTION;  Surgeon: Jose Mobley MD;  Location: [facility];  Service: Obstetrics/Gynecology    OTHER SURGICAL HISTORY      removal of endometriosis in 2014         FAMILY HISTORY  Family History   Problem Relation Age of Onset    Malig Hyperthermia Neg Hx          SOCIAL HISTORY  Social History     Socioeconomic History    Marital status: Single   Tobacco Use    Smoking status: Every Day     Packs/day: 1     Types: Cigarettes    Smokeless tobacco: Never   Substance and Sexual Activity    Alcohol use: Never     Comment: sober    Drug use: No    Sexual activity: Defer          ALLERGIES  Patient has no known allergies.        REVIEW OF SYSTEMS  Review of Systems         PHYSICAL EXAM  ED Triage Vitals   Temp Heart Rate Resp BP SpO2   11/14/23 1258 11/14/23 1258 11/14/23 1258 11/14/23 1300 11/14/23 1258   96.5 °F (35.8 °C) (!) 124 20 130/90 98 %      Temp src Heart Rate Source Patient Position BP Location FiO2 (%)   11/14/23 1258 11/14/23 1258 -- -- --   Tympanic Monitor          Physical Exam      GENERAL: no acute distress  HENT: normocephalic, atraumatic  EYES: no scleral icterus  CV: regular rhythm, normal rate  RESPIRATORY: normal effort CTA B  ABDOMEN: nondistended soft nontender  : Chaperoned by KEITH Cali.  On inspection of although the patient does have swelling and tenderness to the right vulva at the introitus consistent with Bartholin cyst.    MUSCULOSKELETAL: no deformity  NEURO: alert, moves all extremities, follows commands  PSYCH:  calm, cooperative  SKIN: warm, dry    Vital signs and nursing notes reviewed.          PROCEDURES  Incision & Drainage    Date/Time: 11/14/2023 3:58 PM    Performed by: Debby Fuller PA-C  Authorized by: Don Warren MD    Consent:     Consent obtained:  Verbal    Consent given by:  Patient    Risks, benefits, and alternatives were discussed: yes      Risks discussed:  Bleeding, incomplete drainage and infection    Alternatives discussed:  No treatment  Location:     Type:  Bartholin cyst    Location:  Anogenital    Anogenital location:  Bartholin's gland  Pre-procedure details:     Skin preparation:  Povidone-iodine  Sedation:     Sedation type:  None  Anesthesia:     Anesthesia method:  Local infiltration    Local anesthetic:  Lidocaine 1% WITH epi  Procedure type:     Complexity:  Complex  Procedure details:     Incision types:  Single straight    Wound management:  Probed and deloculated and extensive cleaning    Drainage:  Purulent    Drainage amount:  Copious    Wound treatment:  Wound left open    Packing materials:   None  Post-procedure details:     Procedure completion:  Tolerated well, no immediate complications                MEDICATIONS GIVEN IN ER  Medications - No data to display                MEDICAL DECISION MAKING, PROGRESS, and CONSULTS    All labs have been independently reviewed by me.  All radiology studies have been reviewed by me and I have also reviewed the radiology report.   EKG's independently viewed and interpreted by me.  Discussion below represents my analysis of pertinent findings related to patient's condition, differential diagnosis, treatment plan and final disposition.            Orders placed during this visit:  Orders Placed This Encounter   Procedures    Ambulatory Referral to [facility]           Differential diagnosis:  Cellulitis, abscess, Bartholin cyst      Independent interpretation of labs, radiology studies, and discussions with consultants:         After examination I recommended incision and drainage, patient agreeable.  This was performed without complication she tolerated the procedure very well.  Counseled her on wound care follow-up and indications for return to the ER, recommended close follow-up with gynecology.  I prescribed a 10-day course of Bactrim.  She is agreeable and stable for discharge.      - Shared decision making: Recommended incision and drainage, patient agreeable    Additional orders considered but not ordered:  Considered blood work, not indicated at this time, afebrile, no systemic symptoms      Additional sources:    - Chronic or social conditions impacting care: Recurrent Bartholin abscess          DIAGNOSIS  Final diagnoses:   Bartholin's gland abscess           Follow Up:  PATIENT CONNECTION - [facility] [id number]  [phone]  Schedule an appointment as soon as possible for a visit in 2 days      Jose Mobley MD  [address] [address]  [phone]    Schedule an appointment as soon as possible for a visit        [facility] EMERGENCY DEPARTMENT  [address]  [facility] [address] [phone]    If symptoms worsen or any concerns      RX:     Medication List        New Prescriptions      sulfamethoxazole-trimethoprim 800-160 MG per tablet  Commonly known as: BACTRIM DS,SEPTRA DS  Take 1 tablet by mouth 2 (Two) Times a Day for 10 days.            Stop      azithromycin 500 MG tablet  Commonly known as: Zithromax               Where to Get Your Medications        These medications were sent to [facility] [id number] - [facility] [address] [facility] - [phone]  - [phone]   [address] [facility] [address]      Phone: [phone]   sulfamethoxazole-trimethoprim 800-160 MG per tablet         Latest Documented Vital Signs:  As of 15:55 EST  BP- 138/95 HR- 88 Temp- 96.5 °F (35.8 °C) (Tympanic) O2 sat- 98%              --    Please note that portions of this were completed with a voice recognition program.       Note Disclaimer: At [facility], we believe that sharing information builds trust and better relationships. You are receiving this note because you are receiving care at [facility] or recently visited. It is possible you will see health information before a provider has talked with you about it. This kind of information can be easy to misunderstand. To help you fully understand what it means for your health, we urge you to discuss this note with your provider.             Debby Fuller PA-C  11/14/23 1312

## 2023-12-10 ENCOUNTER — APPOINTMENT (OUTPATIENT)
Dept: CT IMAGING | Facility: HOSPITAL | Age: 33
End: 2023-12-10
Payer: COMMERCIAL

## 2023-12-10 ENCOUNTER — HOSPITAL ENCOUNTER (INPATIENT)
Facility: HOSPITAL | Age: 33
LOS: 2 days | Discharge: HOME | End: 2023-12-12
Attending: EMERGENCY MEDICINE | Admitting: HOSPITALIST
Payer: COMMERCIAL

## 2023-12-10 ENCOUNTER — APPOINTMENT (OUTPATIENT)
Dept: GENERAL RADIOLOGY | Facility: HOSPITAL | Age: 33
End: 2023-12-10
Payer: COMMERCIAL

## 2023-12-10 DIAGNOSIS — R10.9 INTRACTABLE ABDOMINAL PAIN: Primary | ICD-10-CM

## 2023-12-10 DIAGNOSIS — R93.5 ABNORMAL ABDOMINAL CT SCAN: ICD-10-CM

## 2023-12-10 DIAGNOSIS — N39.0 ACUTE UTI: ICD-10-CM

## 2023-12-10 DIAGNOSIS — D72.829 LEUKOCYTOSIS, UNSPECIFIED TYPE: ICD-10-CM

## 2023-12-10 PROBLEM — F11.10 OPIOID ABUSE: Status: ACTIVE | Noted: 2023-04-24

## 2023-12-10 PROBLEM — F13.99: Status: ACTIVE | Noted: 2023-04-24

## 2023-12-10 PROBLEM — F14.10 COCAINE ABUSE: Status: ACTIVE | Noted: 2023-04-24

## 2023-12-10 PROBLEM — F41.8 ANXIETY WITH DEPRESSION: Status: ACTIVE | Noted: 2017-11-03

## 2023-12-10 PROBLEM — F19.20 PSYCHOACTIVE SUBSTANCE DEPENDENCE: Status: ACTIVE | Noted: 2023-04-24

## 2023-12-10 LAB
ALBUMIN SERPL-MCNC: 4.7 G/DL (ref 3.5–5.2)
ALBUMIN/GLOB SERPL: 1.9 G/DL
ALP SERPL-CCNC: 82 U/L (ref 39–117)
ALT SERPL W P-5'-P-CCNC: 44 U/L (ref 1–33)
ANION GAP SERPL CALCULATED.3IONS-SCNC: 12 MMOL/L (ref 5–15)
AST SERPL-CCNC: 21 U/L (ref 1–32)
BACTERIA UR QL AUTO: ABNORMAL /HPF
BASOPHILS # BLD AUTO: 0.07 10*3/MM3 (ref 0–0.2)
BASOPHILS NFR BLD AUTO: 0.2 % (ref 0–1.5)
BILIRUB SERPL-MCNC: 0.4 MG/DL (ref 0–1.2)
BILIRUB UR QL STRIP: NEGATIVE
BUN SERPL-MCNC: 9 MG/DL (ref 6–20)
BUN/CREAT SERPL: 16.7 (ref 7–25)
CALCIUM SPEC-SCNC: 9.3 MG/DL (ref 8.6–10.5)
CHLORIDE SERPL-SCNC: 99 MMOL/L (ref 98–107)
CLARITY UR: ABNORMAL
CO2 SERPL-SCNC: 26 MMOL/L (ref 22–29)
COLOR UR: YELLOW
CREAT SERPL-MCNC: 0.54 MG/DL (ref 0.57–1)
D-LACTATE SERPL-SCNC: 1.4 MMOL/L (ref 0.5–2)
DEPRECATED RDW RBC AUTO: 45.6 FL (ref 37–54)
EGFRCR SERPLBLD CKD-EPI 2021: 125.6 ML/MIN/1.73
EOSINOPHIL # BLD AUTO: 0 10*3/MM3 (ref 0–0.4)
EOSINOPHIL NFR BLD AUTO: 0 % (ref 0.3–6.2)
ERYTHROCYTE [DISTWIDTH] IN BLOOD BY AUTOMATED COUNT: 12.7 % (ref 12.3–15.4)
GLOBULIN UR ELPH-MCNC: 2.5 GM/DL
GLUCOSE SERPL-MCNC: 121 MG/DL (ref 65–99)
GLUCOSE UR STRIP-MCNC: NEGATIVE MG/DL
HCG SERPL QL: NEGATIVE
HCT VFR BLD AUTO: 41.5 % (ref 34–46.6)
HGB BLD-MCNC: 13.9 G/DL (ref 12–15.9)
HGB UR QL STRIP.AUTO: ABNORMAL
HYALINE CASTS UR QL AUTO: ABNORMAL /LPF
KETONES UR QL STRIP: NEGATIVE
LEUKOCYTE ESTERASE UR QL STRIP.AUTO: ABNORMAL
LIPASE SERPL-CCNC: 7 U/L (ref 13–60)
LITHIUM SERPL-SCNC: <0.1 MMOL/L (ref 0.6–1.2)
LYMPHOCYTES # BLD AUTO: 2.02 10*3/MM3 (ref 0.7–3.1)
LYMPHOCYTES NFR BLD AUTO: 6.5 % (ref 19.6–45.3)
MCH RBC QN AUTO: 32.3 PG (ref 26.6–33)
MCHC RBC AUTO-ENTMCNC: 33.5 G/DL (ref 31.5–35.7)
MCV RBC AUTO: 96.3 FL (ref 79–97)
MONOCYTES # BLD AUTO: 2.45 10*3/MM3 (ref 0.1–0.9)
MONOCYTES NFR BLD AUTO: 7.8 % (ref 5–12)
NEUTROPHILS NFR BLD AUTO: 26.48 10*3/MM3 (ref 1.7–7)
NEUTROPHILS NFR BLD AUTO: 84.7 % (ref 42.7–76)
NITRITE UR QL STRIP: NEGATIVE
PH UR STRIP.AUTO: 8.5 [PH] (ref 5–8)
PLATELET # BLD AUTO: 346 10*3/MM3 (ref 140–450)
PMV BLD AUTO: 9.3 FL (ref 6–12)
POTASSIUM SERPL-SCNC: 3.9 MMOL/L (ref 3.5–5.2)
PROCALCITONIN SERPL-MCNC: 0.38 NG/ML (ref 0–0.25)
PROT SERPL-MCNC: 7.2 G/DL (ref 6–8.5)
PROT UR QL STRIP: ABNORMAL
RBC # BLD AUTO: 4.31 10*6/MM3 (ref 3.77–5.28)
RBC # UR STRIP: ABNORMAL /HPF
REF LAB TEST METHOD: ABNORMAL
SODIUM SERPL-SCNC: 137 MMOL/L (ref 136–145)
SP GR UR STRIP: >=1.03 (ref 1–1.03)
SQUAMOUS #/AREA URNS HPF: ABNORMAL /HPF
UROBILINOGEN UR QL STRIP: ABNORMAL
WBC # UR STRIP: ABNORMAL /HPF
WBC NRBC COR # BLD AUTO: 31.26 10*3/MM3 (ref 3.4–10.8)

## 2023-12-10 PROCEDURE — 80307 DRUG TEST PRSMV CHEM ANLYZR: CPT | Performed by: NURSE PRACTITIONER

## 2023-12-10 PROCEDURE — 80178 ASSAY OF LITHIUM: CPT | Performed by: EMERGENCY MEDICINE

## 2023-12-10 PROCEDURE — 25010000002 HYDROMORPHONE PER 4 MG: Performed by: EMERGENCY MEDICINE

## 2023-12-10 PROCEDURE — 25810000003 LACTATED RINGERS SOLUTION: Performed by: EMERGENCY MEDICINE

## 2023-12-10 PROCEDURE — 85025 COMPLETE CBC W/AUTO DIFF WBC: CPT | Performed by: EMERGENCY MEDICINE

## 2023-12-10 PROCEDURE — 87086 URINE CULTURE/COLONY COUNT: CPT | Performed by: EMERGENCY MEDICINE

## 2023-12-10 PROCEDURE — 99285 EMERGENCY DEPT VISIT HI MDM: CPT

## 2023-12-10 PROCEDURE — 83690 ASSAY OF LIPASE: CPT | Performed by: EMERGENCY MEDICINE

## 2023-12-10 PROCEDURE — 83605 ASSAY OF LACTIC ACID: CPT | Performed by: EMERGENCY MEDICINE

## 2023-12-10 PROCEDURE — 81001 URINALYSIS AUTO W/SCOPE: CPT | Performed by: EMERGENCY MEDICINE

## 2023-12-10 PROCEDURE — 25010000002 ONDANSETRON PER 1 MG: Performed by: EMERGENCY MEDICINE

## 2023-12-10 PROCEDURE — 74018 RADEX ABDOMEN 1 VIEW: CPT

## 2023-12-10 PROCEDURE — 71045 X-RAY EXAM CHEST 1 VIEW: CPT

## 2023-12-10 PROCEDURE — 25010000002 DIPHENHYDRAMINE PER 50 MG: Performed by: EMERGENCY MEDICINE

## 2023-12-10 PROCEDURE — 25510000001 IOPAMIDOL 61 % SOLUTION: Performed by: EMERGENCY MEDICINE

## 2023-12-10 PROCEDURE — 84145 PROCALCITONIN (PCT): CPT | Performed by: EMERGENCY MEDICINE

## 2023-12-10 PROCEDURE — 80053 COMPREHEN METABOLIC PANEL: CPT | Performed by: EMERGENCY MEDICINE

## 2023-12-10 PROCEDURE — 74177 CT ABD & PELVIS W/CONTRAST: CPT

## 2023-12-10 PROCEDURE — 25010000002 DROPERIDOL PER 5 MG: Performed by: EMERGENCY MEDICINE

## 2023-12-10 PROCEDURE — G0378 HOSPITAL OBSERVATION PER HR: HCPCS

## 2023-12-10 PROCEDURE — 25010000002 KETOROLAC TROMETHAMINE PER 15 MG: Performed by: EMERGENCY MEDICINE

## 2023-12-10 PROCEDURE — 25810000003 SODIUM CHLORIDE 0.9 % SOLUTION: Performed by: EMERGENCY MEDICINE

## 2023-12-10 PROCEDURE — 84703 CHORIONIC GONADOTROPIN ASSAY: CPT | Performed by: EMERGENCY MEDICINE

## 2023-12-10 PROCEDURE — 36415 COLL VENOUS BLD VENIPUNCTURE: CPT

## 2023-12-10 PROCEDURE — 25010000002 CEFTRIAXONE PER 250 MG: Performed by: EMERGENCY MEDICINE

## 2023-12-10 PROCEDURE — 25810000003 LACTATED RINGERS PER 1000 ML: Performed by: EMERGENCY MEDICINE

## 2023-12-10 PROCEDURE — 87040 BLOOD CULTURE FOR BACTERIA: CPT | Performed by: EMERGENCY MEDICINE

## 2023-12-10 RX ORDER — HYDROMORPHONE HYDROCHLORIDE 1 MG/ML
0.5 INJECTION, SOLUTION INTRAMUSCULAR; INTRAVENOUS; SUBCUTANEOUS EVERY 4 HOURS PRN
Status: COMPLETED | OUTPATIENT
Start: 2023-12-10 | End: 2023-12-11

## 2023-12-10 RX ORDER — ACETAMINOPHEN 325 MG/1
650 TABLET ORAL EVERY 4 HOURS PRN
Status: DISCONTINUED | OUTPATIENT
Start: 2023-12-10 | End: 2023-12-12 | Stop reason: HOSPADM

## 2023-12-10 RX ORDER — ONDANSETRON 2 MG/ML
4 INJECTION INTRAMUSCULAR; INTRAVENOUS EVERY 6 HOURS PRN
Status: DISCONTINUED | OUTPATIENT
Start: 2023-12-10 | End: 2023-12-12 | Stop reason: HOSPADM

## 2023-12-10 RX ORDER — KETOROLAC TROMETHAMINE 15 MG/ML
15 INJECTION, SOLUTION INTRAMUSCULAR; INTRAVENOUS ONCE
Status: COMPLETED | OUTPATIENT
Start: 2023-12-10 | End: 2023-12-10

## 2023-12-10 RX ORDER — BISACODYL 5 MG/1
5 TABLET, DELAYED RELEASE ORAL DAILY PRN
Status: DISCONTINUED | OUTPATIENT
Start: 2023-12-10 | End: 2023-12-12 | Stop reason: HOSPADM

## 2023-12-10 RX ORDER — ONDANSETRON 4 MG/1
4 TABLET, FILM COATED ORAL EVERY 6 HOURS PRN
Status: DISCONTINUED | OUTPATIENT
Start: 2023-12-10 | End: 2023-12-12 | Stop reason: HOSPADM

## 2023-12-10 RX ORDER — HYDROMORPHONE HYDROCHLORIDE 1 MG/ML
0.5 INJECTION, SOLUTION INTRAMUSCULAR; INTRAVENOUS; SUBCUTANEOUS ONCE
Status: COMPLETED | OUTPATIENT
Start: 2023-12-10 | End: 2023-12-10

## 2023-12-10 RX ORDER — ACETAMINOPHEN 160 MG/5ML
650 SOLUTION ORAL EVERY 4 HOURS PRN
Status: DISCONTINUED | OUTPATIENT
Start: 2023-12-10 | End: 2023-12-12 | Stop reason: HOSPADM

## 2023-12-10 RX ORDER — SODIUM CHLORIDE 9 MG/ML
40 INJECTION, SOLUTION INTRAVENOUS AS NEEDED
Status: DISCONTINUED | OUTPATIENT
Start: 2023-12-10 | End: 2023-12-12 | Stop reason: HOSPADM

## 2023-12-10 RX ORDER — BISACODYL 10 MG
10 SUPPOSITORY, RECTAL RECTAL DAILY PRN
Status: DISCONTINUED | OUTPATIENT
Start: 2023-12-10 | End: 2023-12-12 | Stop reason: HOSPADM

## 2023-12-10 RX ORDER — SODIUM CHLORIDE 0.9 % (FLUSH) 0.9 %
10 SYRINGE (ML) INJECTION EVERY 12 HOURS SCHEDULED
Status: DISCONTINUED | OUTPATIENT
Start: 2023-12-10 | End: 2023-12-12 | Stop reason: HOSPADM

## 2023-12-10 RX ORDER — ACETAMINOPHEN 650 MG/1
650 SUPPOSITORY RECTAL EVERY 4 HOURS PRN
Status: DISCONTINUED | OUTPATIENT
Start: 2023-12-10 | End: 2023-12-12 | Stop reason: HOSPADM

## 2023-12-10 RX ORDER — SODIUM CHLORIDE 0.9 % (FLUSH) 0.9 %
10 SYRINGE (ML) INJECTION AS NEEDED
Status: DISCONTINUED | OUTPATIENT
Start: 2023-12-10 | End: 2023-12-12 | Stop reason: HOSPADM

## 2023-12-10 RX ORDER — DROPERIDOL 2.5 MG/ML
2.5 INJECTION, SOLUTION INTRAMUSCULAR; INTRAVENOUS ONCE
Status: COMPLETED | OUTPATIENT
Start: 2023-12-10 | End: 2023-12-10

## 2023-12-10 RX ORDER — NITROGLYCERIN 0.4 MG/1
0.4 TABLET SUBLINGUAL
Status: DISCONTINUED | OUTPATIENT
Start: 2023-12-10 | End: 2023-12-12 | Stop reason: HOSPADM

## 2023-12-10 RX ORDER — POLYETHYLENE GLYCOL 3350 17 G/17G
17 POWDER, FOR SOLUTION ORAL DAILY PRN
Status: DISCONTINUED | OUTPATIENT
Start: 2023-12-10 | End: 2023-12-12 | Stop reason: HOSPADM

## 2023-12-10 RX ORDER — SODIUM CHLORIDE, SODIUM LACTATE, POTASSIUM CHLORIDE, CALCIUM CHLORIDE 600; 310; 30; 20 MG/100ML; MG/100ML; MG/100ML; MG/100ML
125 INJECTION, SOLUTION INTRAVENOUS CONTINUOUS
Status: DISCONTINUED | OUTPATIENT
Start: 2023-12-10 | End: 2023-12-12 | Stop reason: HOSPADM

## 2023-12-10 RX ORDER — ONDANSETRON 2 MG/ML
4 INJECTION INTRAMUSCULAR; INTRAVENOUS ONCE
Status: COMPLETED | OUTPATIENT
Start: 2023-12-10 | End: 2023-12-10

## 2023-12-10 RX ORDER — AMOXICILLIN 250 MG
2 CAPSULE ORAL 2 TIMES DAILY PRN
Status: DISCONTINUED | OUTPATIENT
Start: 2023-12-10 | End: 2023-12-12 | Stop reason: HOSPADM

## 2023-12-10 RX ORDER — DIPHENHYDRAMINE HYDROCHLORIDE 50 MG/ML
50 INJECTION INTRAMUSCULAR; INTRAVENOUS ONCE
Status: COMPLETED | OUTPATIENT
Start: 2023-12-10 | End: 2023-12-10

## 2023-12-10 RX ADMIN — CEFTRIAXONE SODIUM 1000 MG: 1 INJECTION, POWDER, FOR SOLUTION INTRAMUSCULAR; INTRAVENOUS at 23:06

## 2023-12-10 RX ADMIN — ONDANSETRON 4 MG: 2 INJECTION INTRAMUSCULAR; INTRAVENOUS at 20:01

## 2023-12-10 RX ADMIN — DROPERIDOL 2.5 MG: 2.5 INJECTION, SOLUTION INTRAMUSCULAR; INTRAVENOUS at 22:31

## 2023-12-10 RX ADMIN — SODIUM CHLORIDE, POTASSIUM CHLORIDE, SODIUM LACTATE AND CALCIUM CHLORIDE 125 ML/HR: 600; 310; 30; 20 INJECTION, SOLUTION INTRAVENOUS at 22:38

## 2023-12-10 RX ADMIN — HYDROMORPHONE HYDROCHLORIDE 0.5 MG: 1 INJECTION, SOLUTION INTRAMUSCULAR; INTRAVENOUS; SUBCUTANEOUS at 21:23

## 2023-12-10 RX ADMIN — KETOROLAC TROMETHAMINE 15 MG: 15 INJECTION, SOLUTION INTRAMUSCULAR; INTRAVENOUS at 22:31

## 2023-12-10 RX ADMIN — IOPAMIDOL 85 ML: 612 INJECTION, SOLUTION INTRAVENOUS at 20:39

## 2023-12-10 RX ADMIN — DIPHENHYDRAMINE HYDROCHLORIDE 50 MG: 50 INJECTION, SOLUTION INTRAMUSCULAR; INTRAVENOUS at 22:59

## 2023-12-10 RX ADMIN — SODIUM CHLORIDE, POTASSIUM CHLORIDE, SODIUM LACTATE AND CALCIUM CHLORIDE 1000 ML: 600; 310; 30; 20 INJECTION, SOLUTION INTRAVENOUS at 22:38

## 2023-12-10 RX ADMIN — SODIUM CHLORIDE 1000 ML: 9 INJECTION, SOLUTION INTRAVENOUS at 20:00

## 2023-12-10 RX ADMIN — HYDROMORPHONE HYDROCHLORIDE 0.5 MG: 1 INJECTION, SOLUTION INTRAMUSCULAR; INTRAVENOUS; SUBCUTANEOUS at 20:01

## 2023-12-10 NOTE — ED TRIAGE NOTES
Patient from home via EMS reporting RLQ pain that radiates in to pelvis and right flank. Pain started last night. Patient denies N/V/D. States she began having difficulty urinating about an hour ago.

## 2023-12-10 NOTE — ED PROVIDER NOTES
EMERGENCY DEPARTMENT ENCOUNTER    Room Number:  E465/1  Date seen:  12/11/2023  PCP: Provider, No Known  Historian: Patient and EMS      HPI:  Chief Complaint: Right-sided abdominal pain  Context: Karissa Roach is a 33 y.o. female who presents to the ED c/o right sided abdominal pain that radiates into her pelvis and right flank that began last night but has become worse today.  The patient states that the pain is worse with any movement.  She states she has had nausea and feels like she is going to vomit now.  She denies diarrhea or dysuria.  She denies fevers or chills.  Patient states she has had laparoscopic surgery in the past but no history of kidney stones.      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       PAST SURGICAL HISTORY  Past Surgical History:   Procedure Laterality Date    ABDOMINAL SURGERY      D & C WITH SUCTION N/A 9/20/2018    Procedure: DILATATION AND CURETTAGE WITH SUCTION;  Surgeon: Jose Mobley MD;  Location: [facility];  Service: Obstetrics/Gynecology    OTHER SURGICAL HISTORY      removal of endometriosis in 2014         FAMILY HISTORY  Family History   Problem Relation Age of Onset    Malig Hyperthermia Neg Hx          SOCIAL HISTORY  Social History     Socioeconomic History    Marital status: Single   Tobacco Use    Smoking status: Every Day     Packs/day: 1      Types: Cigarettes    Smokeless tobacco: Never   Vaping Use    Vaping Use: Never used   Substance and Sexual Activity    Alcohol use: Never     Comment: sober    Drug use: No    Sexual activity: Defer         ALLERGIES  Inapsine [droperidol]      PHYSICAL EXAM  ED Triage Vitals [12/10/23 1847]   Temp Heart Rate Resp BP SpO2   98.6 °F (37 °C) 115 18 116/79 98 %      Temp src Heart Rate Source Patient Position BP Location FiO2 (%)   -- -- -- -- --       Physical Exam      GENERAL: 33-year-old female in mild distress  HENT: NCAT: nares patent: Neck supple  EYES: no scleral icterus  CV: regular rhythm, normal rate  RESPIRATORY: normal effort  ABDOMEN: soft, right side abdominal pain with guarding but no rebound and diminished bowel sounds: No CVA tenderness  MUSCULOSKELETAL: no deformity  NEURO: alert with nonfocal neuro exam  PSYCH:  calm, cooperative  SKIN: warm, dry    Vital signs and nursing notes reviewed.      LAB RESULTS  Recent Results (from the past 24 hour(s))   Comprehensive Metabolic Panel    Collection Time: 12/10/23  7:57 PM    Specimen: Blood   Result Value Ref Range    Glucose 121 (H) 65 - 99 mg/dL    BUN 9 6 - 20 mg/dL    Creatinine 0.54 (L) 0.57 - 1.00 mg/dL    Sodium 137 136 - 145 mmol/L    Potassium 3.9 3.5 - 5.2 mmol/L    Chloride 99 98 - 107 mmol/L    CO2 26.0 22.0 - 29.0 mmol/L    Calcium 9.3 8.6 - 10.5 mg/dL    Total Protein 7.2 6.0 - 8.5 g/dL    Albumin 4.7 3.5 - 5.2 g/dL    ALT (SGPT) 44 (H) 1 - 33 U/L    AST (SGOT) 21 1 - 32 U/L    Alkaline Phosphatase 82 39 - 117 U/L    Total Bilirubin 0.4 0.0 - 1.2 mg/dL    Globulin 2.5 gm/dL    A/G Ratio 1.9 g/dL    BUN/Creatinine Ratio 16.7 7.0 - 25.0    Anion Gap 12.0 5.0 - 15.0 mmol/L    eGFR 125.6 >60.0 mL/min/1.73   Lipase    Collection Time: 12/10/23  7:57 PM    Specimen: Blood   Result Value Ref Range    Lipase 7 (L) 13 - 60 U/L   hCG, Serum, Qualitative    Collection Time: 12/10/23  7:57 PM    Specimen: Blood   Result Value Ref Range    HCG  Qualitative Negative Negative   CBC Auto Differential    Collection Time: 12/10/23  7:57 PM    Specimen: Blood   Result Value Ref Range    WBC 31.26 (C) 3.40 - 10.80 10*3/mm3    RBC 4.31 3.77 - 5.28 10*6/mm3    Hemoglobin 13.9 12.0 - 15.9 g/dL    Hematocrit 41.5 34.0 - 46.6 %    MCV 96.3 79.0 - 97.0 fL    MCH 32.3 26.6 - 33.0 pg    MCHC 33.5 31.5 - 35.7 g/dL    RDW 12.7 12.3 - 15.4 %    RDW-SD 45.6 37.0 - 54.0 fl    MPV 9.3 6.0 - 12.0 fL    Platelets 346 140 - 450 10*3/mm3    Neutrophil % 84.7 (H) 42.7 - 76.0 %    Lymphocyte % 6.5 (L) 19.6 - 45.3 %    Monocyte % 7.8 5.0 - 12.0 %    Eosinophil % 0.0 (L) 0.3 - 6.2 %    Basophil % 0.2 0.0 - 1.5 %    Neutrophils, Absolute 26.48 (H) 1.70 - 7.00 10*3/mm3    Lymphocytes, Absolute 2.02 0.70 - 3.10 10*3/mm3    Monocytes, Absolute 2.45 (H) 0.10 - 0.90 10*3/mm3    Eosinophils, Absolute 0.00 0.00 - 0.40 10*3/mm3    Basophils, Absolute 0.07 0.00 - 0.20 10*3/mm3   Procalcitonin    Collection Time: 12/10/23  7:57 PM    Specimen: Blood   Result Value Ref Range    Procalcitonin 0.38 (H) 0.00 - 0.25 ng/mL   Urinalysis With Microscopic If Indicated (No Culture) - Urine, Clean Catch    Collection Time: 12/10/23  9:57 PM    Specimen: Urine, Clean Catch   Result Value Ref Range    Color, UA Yellow Yellow, Straw    Appearance, UA Cloudy (A) Clear    pH, UA 8.5 (H) 5.0 - 8.0    Specific Gravity, UA >=1.030 1.005 - 1.030    Glucose, UA Negative Negative    Ketones, UA Negative Negative    Bilirubin, UA Negative Negative    Blood, UA Trace (A) Negative    Protein, UA Trace (A) Negative    Leuk Esterase, UA Moderate (2+) (A) Negative    Nitrite, UA Negative Negative    Urobilinogen, UA 0.2 E.U./dL 0.2 - 1.0 E.U./dL   Urinalysis, Microscopic Only - Urine, Clean Catch    Collection Time: 12/10/23  9:57 PM    Specimen: Urine, Clean Catch   Result Value Ref Range    RBC, UA 3-5 (A) None Seen, 0-2 /HPF    WBC, UA Too Numerous to Count (A) None Seen, 0-2 /HPF    Bacteria, UA 1+ (A) None Seen /HPF     Squamous Epithelial Cells, UA 3-6 (A) None Seen, 0-2 /HPF    Hyaline Casts, UA 0-2 None Seen /LPF    Methodology Automated Microscopy    Urine Drug Screen - Urine, Clean Catch    Collection Time: 12/10/23  9:57 PM    Specimen: Urine, Clean Catch   Result Value Ref Range    Amphet/Methamphet, Screen Positive (A) Negative    Barbiturates Screen, Urine Negative Negative    Benzodiazepine Screen, Urine Negative Negative    Cocaine Screen, Urine Negative Negative    Opiate Screen Negative Negative    THC, Screen, Urine Negative Negative    Methadone Screen, Urine Negative Negative    Oxycodone Screen, Urine Negative Negative    Fentanyl, Urine Negative Negative   Lactic Acid, Plasma    Collection Time: 12/10/23 10:56 PM    Specimen: Blood   Result Value Ref Range    Lactate 1.4 0.5 - 2.0 mmol/L   Lithium Level    Collection Time: 12/10/23 10:56 PM    Specimen: Blood   Result Value Ref Range    Lithium <0.1 (L) 0.6 - 1.2 mmol/L   Respiratory Panel PCR w/COVID-19(SARS-CoV-2) EAMON/RICARDO/MARIETTA/PAD/COR/MINNIE In-House, NP Swab in [facility], 2 HR TAT - Swab, Nasopharynx    Collection Time: 12/11/23  1:00 AM    Specimen: Nasopharynx; Swab   Result Value Ref Range    ADENOVIRUS, PCR Not Detected Not Detected    Coronavirus 229E Not Detected Not Detected    Coronavirus HKU1 Not Detected Not Detected    Coronavirus NL63 Not Detected Not Detected    Coronavirus OC43 Not Detected Not Detected    COVID19 Not Detected Not Detected - Ref. Range    Human Metapneumovirus Not Detected Not Detected    Human Rhinovirus/Enterovirus Not Detected Not Detected    Influenza A PCR Not Detected Not Detected    Influenza B PCR Not Detected Not Detected    Parainfluenza Virus 1 Not Detected Not Detected    Parainfluenza Virus 2 Not Detected Not Detected    Parainfluenza Virus 3 Not Detected Not Detected    Parainfluenza Virus 4 Not Detected Not Detected    RSV, PCR Not Detected Not Detected    Bordetella pertussis pcr Not Detected Not Detected     Bordetella parapertussis PCR Not Detected Not Detected    Chlamydophila pneumoniae PCR Not Detected Not Detected    Mycoplasma pneumo by PCR Not Detected Not Detected   Basic Metabolic Panel    Collection Time: 12/11/23  2:57 AM    Specimen: Blood   Result Value Ref Range    Glucose 104 (H) 65 - 99 mg/dL    BUN 6 6 - 20 mg/dL    Creatinine 0.47 (L) 0.57 - 1.00 mg/dL    Sodium 137 136 - 145 mmol/L    Potassium 4.1 3.5 - 5.2 mmol/L    Chloride 103 98 - 107 mmol/L    CO2 24.1 22.0 - 29.0 mmol/L    Calcium 8.7 8.6 - 10.5 mg/dL    BUN/Creatinine Ratio 12.8 7.0 - 25.0    Anion Gap 9.9 5.0 - 15.0 mmol/L    eGFR 129.9 >60.0 mL/min/1.73   CBC (No Diff)    Collection Time: 12/11/23  2:57 AM    Specimen: Blood   Result Value Ref Range    WBC 27.35 (H) 3.40 - 10.80 10*3/mm3    RBC 3.95 3.77 - 5.28 10*6/mm3    Hemoglobin 12.3 12.0 - 15.9 g/dL    Hematocrit 36.9 34.0 - 46.6 %    MCV 93.4 79.0 - 97.0 fL    MCH 31.1 26.6 - 33.0 pg    MCHC 33.3 31.5 - 35.7 g/dL    RDW 12.1 (L) 12.3 - 15.4 %    RDW-SD 41.8 37.0 - 54.0 fl    MPV 9.2 6.0 - 12.0 fL    Platelets 299 140 - 450 10*3/mm3       Ordered the above labs and reviewed the results.        RADIOLOGY  XR Abdomen KUB    Result Date: 12/11/2023  KUB  HISTORY: Nasogastric tube placement  COMPARISON: None available.  FINDINGS: Nasogastric tube appears to extend into the proximal body of the stomach. No free air is seen. There is some gaseous distention of bowel loops within the upper abdomen.    This report was finalized on 12/11/2023 12:03 AM by Dr. Nadine Trevino M.D on Workstation: [id number]      CT Abdomen Pelvis With Contrast    Result Date: 12/10/2023  CT OF THE ABDOMEN AND PELVIS WITH CONTRAST  HISTORY: Nausea and vomiting. Right-sided abdominal pain.  COMPARISON: May 14, 2021  TECHNIQUE: Axial CT imaging was obtained through the abdomen and pelvis. IV contrast was administered.  FINDINGS: Images through the lung bases demonstrate dependent atelectasis and/or scarring. No  suspicious hepatic lesions are seen. Spleen, pancreas, gallbladder, and duodenum appear normal. Stomach does appear mildly distended. The liver is enlarged, measuring up to 19.9 cm in craniocaudal dimensions. It was enlarged on the prior study. The kidneys enhance symmetrically. There is no hydronephrosis. No distal ureteral or bladder stones are seen. Uterus is normal. The appendix is normal. I don't see any evidence of small bowel obstruction. Terminal ileum appears unremarkable, although there are some patulous loops of small bowel within the pelvis. No acute osseous abnormalities are seen. Small cyst is noted on the left ovary, measuring 1.2 x 1.1 cm. Right ovary is unremarkable.       1. Stomach appears mildly distended, and there are some patulous loops of small bowel within the pelvis. Nonspecific gastroenteritis is not excluded. The appendix is normal, as is the terminal ileum.  Radiation dose reduction techniques were utilized, including automated exposure control and exposure modulation based on body size.   This report was finalized on 12/10/2023 9:42 PM by Dr. Nadine Trevino M.D on Workstation: [id number]       Ordered the above noted radiological studies. Reviewed by me in PACS.            PROCEDURES  Procedures          MEDICATIONS GIVEN IN ER  Medications   lactated ringers infusion (125 mL/hr Intravenous New Bag 12/11/23 0620)   cefTRIAXone (ROCEPHIN) 1,000 mg in sodium chloride 0.9 % 100 mL IVPB-VTB (has no administration in time range)   HYDROmorphone (DILAUDID) injection 0.5 mg (0.5 mg Intravenous Given 12/11/23 0431)   sodium chloride 0.9 % flush 10 mL (10 mL Intravenous Given 12/11/23 0050)   sodium chloride 0.9 % flush 10 mL (has no administration in time range)   sodium chloride 0.9 % infusion 40 mL (has no administration in time range)   sennosides-docusate (PERICOLACE) 8.6-50 MG per tablet 2 tablet (has no administration in time range)     And   polyethylene glycol (MIRALAX) packet 17 g  (has no administration in time range)     And   bisacodyl (DULCOLAX) EC tablet 5 mg (has no administration in time range)     And   bisacodyl (DULCOLAX) suppository 10 mg (has no administration in time range)   nitroglycerin (NITROSTAT) SL tablet 0.4 mg (has no administration in time range)   acetaminophen (TYLENOL) tablet 650 mg (has no administration in time range)     Or   acetaminophen (TYLENOL) 160 MG/5ML oral solution 650 mg (has no administration in time range)     Or   acetaminophen (TYLENOL) suppository 650 mg (has no administration in time range)   ondansetron (ZOFRAN) tablet 4 mg (has no administration in time range)     Or   ondansetron (ZOFRAN) injection 4 mg (has no administration in time range)   sodium chloride 0.9 % bolus 1,000 mL (0 mL Intravenous Stopped 12/10/23 2117)   ondansetron (ZOFRAN) injection 4 mg (4 mg Intravenous Given 12/10/23 2001)   HYDROmorphone (DILAUDID) injection 0.5 mg (0.5 mg Intravenous Given 12/10/23 2001)   iopamidol (ISOVUE-300) 61 % injection 85 mL (85 mL Intravenous Given 12/10/23 2039)   HYDROmorphone (DILAUDID) injection 0.5 mg (0.5 mg Intravenous Given 12/10/23 2123)   lactated ringers bolus 1,000 mL (0 mL Intravenous Stopped 12/10/23 2336)   ketorolac (TORADOL) injection 15 mg (15 mg Intravenous Given 12/10/23 2231)   droperidol (INAPSINE) injection 2.5 mg (2.5 mg Intravenous Given 12/10/23 2231)   cefTRIAXone (ROCEPHIN) 1,000 mg in sodium chloride 0.9 % 100 mL IVPB-VTB (0 mg Intravenous Stopped 12/11/23 0009)   diphenhydrAMINE (BENADRYL) injection 50 mg (50 mg Intravenous Given 12/10/23 2259)             MEDICAL DECISION MAKING, PROGRESS, and CONSULTS    All labs have been independently reviewed by me.  All radiology studies have been reviewed by me and I have also reviewed the radiology report.   EKG's independently viewed and interpreted by me.  Discussion below represents my analysis of pertinent findings related to patient's condition, differential diagnosis,  treatment plan and final disposition.      Additional sources:  - Discussed/ obtained information from independent historians: The patient's significant other is here who states that he has not seen her have pain like this before    - External (non-ED) record review: I reviewed the patient's ER visit here last month where she was seen for a Bartholin's gland abscess      - Shared decision making: After shared decision-making discussion to myself and the patient we agree she is admitted to the hospital for further evaluation and care      Orders placed during this visit:  Orders Placed This Encounter   Procedures    Blood Culture - Blood,    Blood Culture - Blood,    Respiratory Panel PCR w/COVID-19(SARS-CoV-2) EAMON/RICARDO/MARIETTA/PAD/[facility]/MINNIE In-House, NP Swab in UTM/VTM, 2 HR TAT - Swab, Nasopharynx    Urine Culture - Urine,    CT Abdomen Pelvis With Contrast    XR Chest 1 View    XR Abdomen KUB    Comprehensive Metabolic Panel    Lipase    hCG, Serum, Qualitative    Urinalysis With Microscopic If Indicated (No Culture) - Urine, Clean Catch    CBC Auto Differential    Urinalysis, Microscopic Only - Urine, Clean Catch    Lactic Acid, Plasma    Procalcitonin    Lithium Level    Basic Metabolic Panel    CBC (No Diff)    Urine Drug Screen - Urine, Clean Catch    NPO Diet NPO Type: Strict NPO    Nasogastric tube insertion    Vital Signs    Intake & Output    Weigh Patient    Oral Care    Place Sequential Compression Device    Maintain Sequential Compression Device    Telemetry - Maintain IV Access    Telemetry - Place Orders & Notify Provider of Results When Patient Experiences Acute Chest Pain, Dysrhythmia or Respiratory Distress    May Be Off Telemetry for Tests    Up With Assistance    Daily Weights    Neuro Checks    Neurovascular Checks    Opioid Administration - Document EtCO2 and / or SpO2 With Each Set of Vitals & Any Change in Patient Status    Opioid Administration - Notify Provider Hypercapnic Monitoring    Opioid  Administration - Continuous Pulse Oximetry (SpO2)    Code Status and Medical Interventions:    Surgery (on-call MD unless specified)    LHA (on-call MD unless specified) Details    Incentive Spirometry    Oxygen Therapy- Nasal Cannula; Titrate 1-6 LPM Per SpO2; 90 - 95%    SCANNED - TELEMETRY      Insert Peripheral IV    Initiate Observation Status    CBC & Differential         Differential diagnosis:  My differential diagnosis includes but is not limited to gastritis, pancreatitis, cholecystitis, appendicitis, diverticulitis, urinary tract infection, kidney stone, or bowel obstruction.        Independent interpretation of labs, radiology studies, and discussions with consultants:  ED Course as of 12/11/23 0753   Sun Dec 10, 2023   1904 I will treat the patient with IV fluids and control her pain and nausea while obtaining labs, urinalysis and CT scan for further evaluation [GP]   2200 Patient's WBC is 31,000.  I will cover with IV fluids and IV antibiotics.  The patient has had continued pain despite 1 dose of Dilaudid and Zofran and thus I will repeat. [GP]   2216 My independent interpretation of the patient's abdominal CT scan is stomach that is mildly distended with fluid and fluid-filled loops of small bowel within the pelvis and right side of the abdomen.  The left side of the abdomen is gas distended.  I do not see kidney stones.  I do not see free air [GP]   2216 The radiologist believes the stomach is mildly distended and that the patient may have gastroenteritis, but the appendix and terminal ileum are normal [GP]   2217 The patient does have TNTC white blood cells in her urine and 1+ bacteria.  I will cover her with Rocephin and send off a urine culture. [GP]   2224 On repeat examination the patient is sitting up in the bed in significant pain that she is describing as epigastric in origin.  With the patient's elevated white count, abnormal CT scan and continued pain despite 2 doses of Dilaudid I will  consult surgery for further recommendations.  I will treat the patient with Inapsine. [GP]   2246 I discussed the case with Dr. Orellana from general surgery.  We have reviewed the patient's WBC of 31 and intractable abdominal pain.  We reviewed the patient's abdominal CT scan.  She has asked that we place an NG tube and the patient and admit the patient to the hospitalist and she will consult. [GP]   2249 The patient is now having what appears to be a dystonic reaction to the Inapsine.  I will give her Benadryl.  I advised her of my consultation with general surgery and the need for an NG tube and admission to the hospital for further evaluation and care.  The patient and her significant other understand and agree with the plan. [GP]   2253 I discussed the patient's case with Liliana Costello from [facility].  She is aware of the patient's presentation, elevated white count, abnormal CT scan and my consultation with surgery.  She will admit the patient to a telemetry bed under Dr. Boyd's service for further evaluation and care.  She is aware that I have ordered an NG tube. [GP]   232 Currently the patient is doing better after the Benadryl with marked improvement of her dystonic reaction from Inapsine.  Currently she is awaiting NG tube placement by the nurse and admission to the hospital for further evaluation and care and general surgical consultation [GP]      ED Course User Index  [GP] Tarik Butts MD               DIAGNOSIS  Final diagnoses:   Intractable abdominal pain   Leukocytosis, unspecified type   Acute UTI   Abnormal abdominal CT scan         DISPOSITION  ADMISSION    Discussed treatment plan and reason for admission with pt/family and admitting physician.  Pt/family voiced understanding of the plan for admission for further testing/treatment as needed.            Latest Documented Vital Signs:  As of 07:53 EST  BP- 139/98 HR- 118 Temp- 98.2 °F (36.8 °C) (Oral) O2 sat-  100%--      --------------------  Please note that portions of this were completed with a voice recognition program.       Note Disclaimer: At [facility], we believe that sharing information builds trust and better relationships. You are receiving this note because you are receiving care at [facility] or recently visited. It is possible you will see health information before a provider has talked with you about it. This kind of information can be easy to misunderstand. To help you fully understand what it means for your health, we urge you to discuss this note with your provider.             Tarik Butts MD  12/11/23 0751

## 2023-12-11 ENCOUNTER — APPOINTMENT (OUTPATIENT)
Dept: ULTRASOUND IMAGING | Facility: HOSPITAL | Age: 33
End: 2023-12-11
Payer: COMMERCIAL

## 2023-12-11 PROBLEM — K31.0 ACUTE DISTENTION OF STOMACH: Status: ACTIVE | Noted: 2023-12-11

## 2023-12-11 LAB
AMPHET+METHAMPHET UR QL: POSITIVE
ANION GAP SERPL CALCULATED.3IONS-SCNC: 9.9 MMOL/L (ref 5–15)
B PARAPERT DNA SPEC QL NAA+PROBE: NOT DETECTED
B PERT DNA SPEC QL NAA+PROBE: NOT DETECTED
BARBITURATES UR QL SCN: NEGATIVE
BENZODIAZ UR QL SCN: NEGATIVE
BUN SERPL-MCNC: 6 MG/DL (ref 6–20)
BUN/CREAT SERPL: 12.8 (ref 7–25)
C PNEUM DNA NPH QL NAA+NON-PROBE: NOT DETECTED
CALCIUM SPEC-SCNC: 8.7 MG/DL (ref 8.6–10.5)
CANNABINOIDS SERPL QL: NEGATIVE
CHLORIDE SERPL-SCNC: 103 MMOL/L (ref 98–107)
CO2 SERPL-SCNC: 24.1 MMOL/L (ref 22–29)
COCAINE UR QL: NEGATIVE
CREAT SERPL-MCNC: 0.47 MG/DL (ref 0.57–1)
DEPRECATED RDW RBC AUTO: 41.8 FL (ref 37–54)
EGFRCR SERPLBLD CKD-EPI 2021: 129.9 ML/MIN/1.73
ERYTHROCYTE [DISTWIDTH] IN BLOOD BY AUTOMATED COUNT: 12.1 % (ref 12.3–15.4)
FENTANYL UR-MCNC: NEGATIVE NG/ML
FLUAV SUBTYP SPEC NAA+PROBE: NOT DETECTED
FLUBV RNA ISLT QL NAA+PROBE: NOT DETECTED
GLUCOSE SERPL-MCNC: 104 MG/DL (ref 65–99)
HADV DNA SPEC NAA+PROBE: NOT DETECTED
HCOV 229E RNA SPEC QL NAA+PROBE: NOT DETECTED
HCOV HKU1 RNA SPEC QL NAA+PROBE: NOT DETECTED
HCOV NL63 RNA SPEC QL NAA+PROBE: NOT DETECTED
HCOV OC43 RNA SPEC QL NAA+PROBE: NOT DETECTED
HCT VFR BLD AUTO: 36.9 % (ref 34–46.6)
HGB BLD-MCNC: 12.3 G/DL (ref 12–15.9)
HMPV RNA NPH QL NAA+NON-PROBE: NOT DETECTED
HPIV1 RNA ISLT QL NAA+PROBE: NOT DETECTED
HPIV2 RNA SPEC QL NAA+PROBE: NOT DETECTED
HPIV3 RNA NPH QL NAA+PROBE: NOT DETECTED
HPIV4 P GENE NPH QL NAA+PROBE: NOT DETECTED
M PNEUMO IGG SER IA-ACNC: NOT DETECTED
MCH RBC QN AUTO: 31.1 PG (ref 26.6–33)
MCHC RBC AUTO-ENTMCNC: 33.3 G/DL (ref 31.5–35.7)
MCV RBC AUTO: 93.4 FL (ref 79–97)
METHADONE UR QL SCN: NEGATIVE
OPIATES UR QL: NEGATIVE
OXYCODONE UR QL SCN: NEGATIVE
PLATELET # BLD AUTO: 299 10*3/MM3 (ref 140–450)
PMV BLD AUTO: 9.2 FL (ref 6–12)
POTASSIUM SERPL-SCNC: 4.1 MMOL/L (ref 3.5–5.2)
RBC # BLD AUTO: 3.95 10*6/MM3 (ref 3.77–5.28)
RHINOVIRUS RNA SPEC NAA+PROBE: NOT DETECTED
RSV RNA NPH QL NAA+NON-PROBE: NOT DETECTED
SARS-COV-2 RNA NPH QL NAA+NON-PROBE: NOT DETECTED
SODIUM SERPL-SCNC: 137 MMOL/L (ref 136–145)
WBC NRBC COR # BLD AUTO: 27.35 10*3/MM3 (ref 3.4–10.8)

## 2023-12-11 PROCEDURE — 25810000003 LACTATED RINGERS PER 1000 ML: Performed by: EMERGENCY MEDICINE

## 2023-12-11 PROCEDURE — 25010000002 HYDROMORPHONE PER 4 MG: Performed by: HOSPITALIST

## 2023-12-11 PROCEDURE — 25010000002 HYDROMORPHONE PER 4 MG: Performed by: NURSE PRACTITIONER

## 2023-12-11 PROCEDURE — 80048 BASIC METABOLIC PNL TOTAL CA: CPT | Performed by: NURSE PRACTITIONER

## 2023-12-11 PROCEDURE — 85027 COMPLETE CBC AUTOMATED: CPT | Performed by: NURSE PRACTITIONER

## 2023-12-11 PROCEDURE — G0378 HOSPITAL OBSERVATION PER HR: HCPCS

## 2023-12-11 PROCEDURE — 99204 OFFICE O/P NEW MOD 45 MIN: CPT | Performed by: SURGERY

## 2023-12-11 PROCEDURE — 36415 COLL VENOUS BLD VENIPUNCTURE: CPT | Performed by: NURSE PRACTITIONER

## 2023-12-11 PROCEDURE — 76705 ECHO EXAM OF ABDOMEN: CPT

## 2023-12-11 PROCEDURE — 25010000002 CEFTRIAXONE PER 250 MG: Performed by: NURSE PRACTITIONER

## 2023-12-11 PROCEDURE — 0202U NFCT DS 22 TRGT SARS-COV-2: CPT | Performed by: NURSE PRACTITIONER

## 2023-12-11 RX ORDER — HYDROMORPHONE HYDROCHLORIDE 1 MG/ML
0.5 INJECTION, SOLUTION INTRAMUSCULAR; INTRAVENOUS; SUBCUTANEOUS EVERY 4 HOURS PRN
Status: DISCONTINUED | OUTPATIENT
Start: 2023-12-11 | End: 2023-12-11

## 2023-12-11 RX ORDER — FAMOTIDINE 10 MG/ML
20 INJECTION, SOLUTION INTRAVENOUS EVERY 12 HOURS SCHEDULED
Status: DISCONTINUED | OUTPATIENT
Start: 2023-12-11 | End: 2023-12-12 | Stop reason: HOSPADM

## 2023-12-11 RX ORDER — HYDROMORPHONE HYDROCHLORIDE 1 MG/ML
0.5 INJECTION, SOLUTION INTRAMUSCULAR; INTRAVENOUS; SUBCUTANEOUS
Status: DISCONTINUED | OUTPATIENT
Start: 2023-12-11 | End: 2023-12-12 | Stop reason: HOSPADM

## 2023-12-11 RX ADMIN — FAMOTIDINE 20 MG: 10 INJECTION INTRAVENOUS at 08:41

## 2023-12-11 RX ADMIN — HYDROMORPHONE HYDROCHLORIDE 0.5 MG: 1 INJECTION, SOLUTION INTRAMUSCULAR; INTRAVENOUS; SUBCUTANEOUS at 19:22

## 2023-12-11 RX ADMIN — HYDROMORPHONE HYDROCHLORIDE 0.5 MG: 1 INJECTION, SOLUTION INTRAMUSCULAR; INTRAVENOUS; SUBCUTANEOUS at 04:31

## 2023-12-11 RX ADMIN — HYDROMORPHONE HYDROCHLORIDE 0.5 MG: 1 INJECTION, SOLUTION INTRAMUSCULAR; INTRAVENOUS; SUBCUTANEOUS at 22:36

## 2023-12-11 RX ADMIN — SODIUM CHLORIDE, POTASSIUM CHLORIDE, SODIUM LACTATE AND CALCIUM CHLORIDE 125 ML/HR: 600; 310; 30; 20 INJECTION, SOLUTION INTRAVENOUS at 22:36

## 2023-12-11 RX ADMIN — HYDROMORPHONE HYDROCHLORIDE 0.5 MG: 1 INJECTION, SOLUTION INTRAMUSCULAR; INTRAVENOUS; SUBCUTANEOUS at 16:22

## 2023-12-11 RX ADMIN — Medication 10 ML: at 19:52

## 2023-12-11 RX ADMIN — HYDROMORPHONE HYDROCHLORIDE 0.5 MG: 1 INJECTION, SOLUTION INTRAMUSCULAR; INTRAVENOUS; SUBCUTANEOUS at 00:48

## 2023-12-11 RX ADMIN — CEFTRIAXONE SODIUM 1000 MG: 1 INJECTION, POWDER, FOR SOLUTION INTRAMUSCULAR; INTRAVENOUS at 22:36

## 2023-12-11 RX ADMIN — Medication 10 ML: at 00:50

## 2023-12-11 RX ADMIN — FAMOTIDINE 20 MG: 10 INJECTION INTRAVENOUS at 19:52

## 2023-12-11 RX ADMIN — SODIUM CHLORIDE, POTASSIUM CHLORIDE, SODIUM LACTATE AND CALCIUM CHLORIDE 125 ML/HR: 600; 310; 30; 20 INJECTION, SOLUTION INTRAVENOUS at 06:20

## 2023-12-11 RX ADMIN — Medication 10 ML: at 08:41

## 2023-12-11 RX ADMIN — HYDROMORPHONE HYDROCHLORIDE 0.5 MG: 1 INJECTION, SOLUTION INTRAMUSCULAR; INTRAVENOUS; SUBCUTANEOUS at 12:35

## 2023-12-11 RX ADMIN — HYDROMORPHONE HYDROCHLORIDE 0.5 MG: 1 INJECTION, SOLUTION INTRAMUSCULAR; INTRAVENOUS; SUBCUTANEOUS at 08:42

## 2023-12-11 NOTE — H&P
HISTORY AND PHYSICAL   [facility]        Date of Admission: 12/10/2023  Patient Identification:  Name: Karissa Roach  Age: 33 y.o.  Sex: female  :  1990  MRN: [id number]                     Primary Care Physician: Provider, No Known    Chief Complaint: Acute abdominal pain    History of Present Illness:    is a pleasant 33-year-old female presenting with acute right upper quadrant abdominal pain.  She is unaware of any aggravating factors and this became an acute issue over the last 24 hours.  She claims the pain is worse with movement and she is also had associated nausea with dry heaves into the point of emesis but she denies any full-blown emesis.  She denies any fever or chills.  In the ER CT of the abdomen pelvis showed gastric distention in which NG tube was placed and patient has noted improvement with her NG tube in regards to her abdominal pain though she is still clutching her right upper quadrant.  She has never had any issues like this from involving her GI track her gut.  She denies any dysuria there urinalysis seems consistent with a UTI patient was initiated on Rocephin.  Her abdominal pain is confined to her right upper quadrant and epigastrium and she is not having any CVA distribution/pain.  Surgeon  was notified through the ER and will see patient in consult today.  In the ER patient received Benadryl secondary to Inapsine dystonia, Toradol, IVF, Zofran.    Past Medical History:  Past Medical History:   Diagnosis Date    Anemia      Past Surgical History:  Past Surgical History:   Procedure Laterality Date    ABDOMINAL SURGERY      D & C WITH SUCTION N/A 2018    Procedure: DILATATION AND CURETTAGE WITH SUCTION;  Surgeon: Jose Mobley MD;  Location: [facility];  Service: Obstetrics/Gynecology    OTHER SURGICAL HISTORY      removal of endometriosis in       Home Meds:  Medications Prior to Admission   Medication Sig Dispense Refill Last Dose  "   LITHIUM PO Take 50 mg by mouth Daily.          Allergies:  Allergies   Allergen Reactions    Inapsine [Droperidol] Dystonia     Immunizations:  Immunization History   Administered Date(s) Administered    Rho (D) Immune Globulin 2018     Social History:   Social History     Social History Narrative    Not on file     Social History     Socioeconomic History    Marital status: Single   Tobacco Use    Smoking status: Every Day     Packs/day: 1     Types: Cigarettes    Smokeless tobacco: Never   Vaping Use    Vaping Use: Never used   Substance and Sexual Activity    Alcohol use: Never     Comment: sober    Drug use: No    Sexual activity: Defer       Family History:  Family History   Problem Relation Age of Onset    Malig Hyperthermia Neg Hx         Review of Systems patient denies fever chills night sweats.  Admits to nausea and near emesis.  Admits to abdominal pain.  Denies any sick contacts chest pain upper respiratory style illness, shortness of breath palpitation.  Denies any loss conscious or focal loss of function.  Denies any dysuria bruising bleeding.  Remainder of ROS is negative.    Objective:  T Max 24 hrs: Temp (24hrs), Av.4 °F (36.9 °C), Min:98.2 °F (36.8 °C), Max:98.6 °F (37 °C)    Vitals Ranges:   Temp:  [98.2 °F (36.8 °C)-98.6 °F (37 °C)] 98.2 °F (36.8 °C)  Heart Rate:  [114-128] 118  Resp:  [16-18] 18  BP: (110-139)/(69-98) 139/98      Exam:  /98 (BP Location: Right arm, Patient Position: Lying)   Pulse 118   Temp 98.2 °F (36.8 °C) (Oral)   Resp 18   Ht 152.4 cm (60\")   Wt 61.8 kg (136 lb 4.6 oz)   LMP 2023   SpO2 100%   BMI 26.62 kg/m²     General Appearance:    Alert, cooperative, sickly/dehydrated/conversational and otherwise pleasant with NG tube in place.  No family present at bedside   Head:    Normocephalic, without obvious abnormality, atraumatic   Eyes:    PERRL, conjunctivae/corneas clear, EOM's intact, both eyes   Ears:    Normal external ear canals, both " ears   Nose:   Nares normal, septum midline, mucosa normal, no drainage    or sinus tenderness   Throat:   Lips, mucosa, and tongue normal though mucous membranes are quite dry   Neck:   Supple, no meningismus or JVD   Back:     No CVA tenderness   Lungs:     Clear to auscultation bilaterally, respirations unlabored   Chest Wall:    No tenderness or deformity    Heart:    Regular/tachycardic rate and rhythm, S1 and S2 normal   Abdomen:     Soft, nondistended, bowel sounds were noted though little hypoactive, NG with black liquid output in canister~400cc currently, mild guarding but abdomen is not rigid   Extremities: Moving all, no cyanosis or edema   Pulses:   2+ and symmetric all extremities           Neurologic:   CNII-XII intact, no focal deficit      .    Data Review:  Labs in chart were reviewed.             Imaging Results (All)       Procedure Component Value Units Date/Time    XR Abdomen KUB [[id number]] Collected: 12/11/23 0001     Updated: 12/11/23 0006    Narrative:      KUB     HISTORY: Nasogastric tube placement     COMPARISON: None available.     FINDINGS:  Nasogastric tube appears to extend into the proximal body of the  stomach. No free air is seen. There is some gaseous distention of bowel  loops within the upper abdomen.           This report was finalized on 12/11/2023 12:03 AM by Dr. Nadine Trevino M.D on Workstation: [id number]       XR Chest 1 View [[id number]] Resulted: 12/10/23 2345     Updated: 12/10/23 2345    CT Abdomen Pelvis With Contrast [[id number]] Collected: 12/10/23 2133     Updated: 12/10/23 2145    Narrative:      CT OF THE ABDOMEN AND PELVIS WITH CONTRAST     HISTORY: Nausea and vomiting. Right-sided abdominal pain.     COMPARISON: May 14, 2021     TECHNIQUE: Axial CT imaging was obtained through the abdomen and pelvis.  IV contrast was administered.     FINDINGS:  Images through the lung bases demonstrate dependent atelectasis and/or  scarring. No suspicious hepatic lesions  are seen. Spleen, pancreas,  gallbladder, and duodenum appear normal. Stomach does appear mildly  distended. The liver is enlarged, measuring up to 19.9 cm in  craniocaudal dimensions. It was enlarged on the prior study. The kidneys  enhance symmetrically. There is no hydronephrosis. No distal ureteral or  bladder stones are seen. Uterus is normal. The appendix is normal. I  don't see any evidence of small bowel obstruction. Terminal ileum  appears unremarkable, although there are some patulous loops of small  bowel within the pelvis. No acute osseous abnormalities are seen. Small  cyst is noted on the left ovary, measuring 1.2 x 1.1 cm. Right ovary is  unremarkable.       Impression:         1. Stomach appears mildly distended, and there are some patulous loops  of small bowel within the pelvis. Nonspecific gastroenteritis is not  excluded. The appendix is normal, as is the terminal ileum.     Radiation dose reduction techniques were utilized, including automated  exposure control and exposure modulation based on body size.        This report was finalized on 12/10/2023 9:42 PM by Dr. Nadine Trevino M.D on Workstation: [id number]                 Assessment:  Active Hospital Problems    Diagnosis  POA    **Acute distention of stomach [K31.0]  Unknown    Leukocytosis [D72.829]  Yes    Abdominal pain [R10.9]  Yes    Psychoactive substance dependence [F19.20]  Yes    Posttraumatic stress disorder [F43.10]  Yes      Resolved Hospital Problems   No resolved problems to display.       Plan:    CT abdomen pelvis revealed acute distention of the stomach and patient is currently n.p.o. with IV fluids antiemetics and IV pain medication with NG tube until surgery can evaluate   -Defer NG tube and dietary advancement to surgery   -IV Pepcid added   -Etiology questionable -possible GE per CT report      Caution with use of narcotics given patient's past medical history of polysubstance abuse/dependence   -Tox screen  positive for amphetamine      Marked leukocytosis likely all reactive -lactate normal and no recent fevers   -Possible UTI on Rocephin   -Culture pending as are blood cultures x 2.  RVP negative    Reactive tachycardia -improving with IVF though clinically still remains quite dry    Depression anxiety and PTSD will need resumption of lithium once dietary advancement noted.  If further medications needed in the interim, consider psychiatric consultation    SCDs for prophylaxis        Further recommendations to follow as clinical course unfolds    Alvino Boyd MD  12/11/2023  08:22 EST

## 2023-12-11 NOTE — ED NOTES
Patient c/o increasing pain; having a hard time getting comfortable. Patient states that her stomach muscles are tightening up and causing her to have chest pain and trouble breathing. Dr. Butts notified.

## 2023-12-11 NOTE — CONSULTS
Colorectal & General Surgery  Consultation    Patient: Karissa Roach  YOB: 1990  MRN: [id number]      Assessment  Karissa Roach is a 33 y.o. female with right upper quadrant abdominal pain in the setting of significant leukocytosis and diffuse edema throughout her entire gastrointestinal tract on CT scan.  Her clinical symptoms are somewhat consistent with cholecystitis, but this is not supported by her CT scan.  Based on her CT scan, I favor severe gastroenteritis.  Overall, unclear picture.  I will order an ultrasound of the gallbladder as well as stool studies.  She can have ice chips from my standpoint, but would keep nasogastric tube for now since that is at least helped her symptoms.  I will continue to follow.  Please call with any questions or concerns.    History of Present Illness   Karissa Roach is a 33 y.o. female who I am seeing in consultation regarding abdominal pain at the request of Alvino Boyd MD.  She developed acute right upper quadrant abdominal pain yesterday.  She was previously in her usual state of health.  It was associated with nausea but no emesis.  No sick contacts.  Denies fevers, night sweats, chills, diarrhea, constipation.  She is not passing any flatus.  She has never felt like this before.    Most recent colonoscopy: Never    Past Medical History   Past Medical History:   Diagnosis Date    Anemia         Past Surgical History   Past Surgical History:   Procedure Laterality Date    ABDOMINAL SURGERY      D & C WITH SUCTION N/A 9/20/2018    Procedure: DILATATION AND CURETTAGE WITH SUCTION;  Surgeon: Jose Mobley MD;  Location: [facility];  Service: Obstetrics/Gynecology    OTHER SURGICAL HISTORY      removal of endometriosis in 2014       Social History  Social History     Socioeconomic History    Marital status: Single   Tobacco Use    Smoking status: Every Day     Packs/day: 1     Types: Cigarettes    Smokeless tobacco: Never   Vaping Use    Vaping Use:  Never used   Substance and Sexual Activity    Alcohol use: Never     Comment: sober    Drug use: No    Sexual activity: Defer       Family History  Family History   Problem Relation Age of Onset    Malig Hyperthermia Neg Hx        Colorectal cancer family history: None    Review of Systems  Negative except as documented in the HPI.     Allergies  Allergies   Allergen Reactions    Inapsine [Droperidol] Dystonia       Medications    Current Facility-Administered Medications:     acetaminophen (TYLENOL) tablet 650 mg, 650 mg, Oral, Q4H PRN **OR** acetaminophen (TYLENOL) 160 MG/5ML oral solution 650 mg, 650 mg, Oral, Q4H PRN **OR** acetaminophen (TYLENOL) suppository 650 mg, 650 mg, Rectal, Q4H PRN, Chante Costello APRN    sennosides-docusate (PERICOLACE) 8.6-50 MG per tablet 2 tablet, 2 tablet, Oral, BID PRN **AND** polyethylene glycol (MIRALAX) packet 17 g, 17 g, Oral, Daily PRN **AND** bisacodyl (DULCOLAX) EC tablet 5 mg, 5 mg, Oral, Daily PRN **AND** bisacodyl (DULCOLAX) suppository 10 mg, 10 mg, Rectal, Daily PRN, Chante Costello APRN    cefTRIAXone (ROCEPHIN) 1,000 mg in sodium chloride 0.9 % 100 mL IVPB-VTB, 1,000 mg, Intravenous, Q24H, Chante Costello APRN    famotidine (PEPCID) injection 20 mg, 20 mg, Intravenous, Q12H, Alvino Boyd MD, 20 mg at 12/11/23 0841    HYDROmorphone (DILAUDID) injection 0.5 mg, 0.5 mg, Intravenous, Q4H PRN, Alvino Boyd MD, 0.5 mg at 12/11/23 1622    lactated ringers infusion, 125 mL/hr, Intravenous, Continuous, Tarik Butts MD, Last Rate: 125 mL/hr at 12/11/23 0620, 125 mL/hr at 12/11/23 0620    nitroglycerin (NITROSTAT) SL tablet 0.4 mg, 0.4 mg, Sublingual, Q5 Min PRN, Chante Costlelo, LANEDN    ondansetron (ZOFRAN) tablet 4 mg, 4 mg, Oral, Q6H PRN **OR** ondansetron (ZOFRAN) injection 4 mg, 4 mg, Intravenous, Q6H PRN, Chante Costello APRN    sodium chloride 0.9 % flush 10 mL, 10 mL, Intravenous, Q12H, Chante Costello,  APRN, 10 mL at 12/11/23 0841    sodium chloride 0.9 % flush 10 mL, 10 mL, Intravenous, PRN, Chante Costello, LANDEN    sodium chloride 0.9 % infusion 40 mL, 40 mL, Intravenous, PRN, Chante Costello, LANDEN    Vital Signs  Vitals:    12/11/23 1414   BP: 122/82   Pulse: (!) 125   Resp: 18   Temp: 98.2 °F (36.8 °C)   SpO2:           Physical Exam  Constitutional: Resting comfortably, no acute distress  Neck: Supple, trachea midline  Respiratory: No increased work of breathing, Symmetric excursion  Cardiovascular: Well pefursed, no jugular venous distention evident   Abdominal:  Soft, tender to palpation in the right upper quadrant and right lower abdomen as well as the epigastrium but no involuntary guarding, mildly distended  Lymphatics: No cervical or suprascapular adenopathy  Skin: Warm, dry, no rash on visualized skin surfaces  Musculoskeletal: Symmetric strength, no obvious gross abnormalities  Psychiatric: Alert and oriented ×3, normal affect     Laboratory Results  I have personally reviewed CBC with WBC 27, hemoglobin 12, platelets 299.  BMP with creatinine 0.47, potassium 4.1.  Lactate 1.4.  Lipase 7.  Procalcitonin 0.3.    Radiology  I have personally reviewed CT scan of the abdomen pelvis demonstrates relatively normal-appearing gallbladder.  Diffusely edematous stomach and small intestine.  No pneumoperitoneum.  No clear transition point in dilation of her intestine.    Endoscopy  None to review         Sunny Garcia MD  Colorectal & General Surgery  [facility]    [address], [address]  [place], [place], [facility]  P: [phone]  F: [phone]

## 2023-12-11 NOTE — PLAN OF CARE
Goal Outcome Evaluation:  Plan of Care Reviewed With: patient        Progress: no change  Outcome Evaluation: pt arrived from ER via gurney, ambulated from doorway to bed to bathroom. A/0x3, NGT at 55cm at LIS with mininal green bloody tinged fluid noted. IVF infusing s s/s of complications, medicated almost immediately for abd pain with dilaudid. No n/v at this time. SR up x3, call bell in reach, bed alarm on.

## 2023-12-11 NOTE — PLAN OF CARE
Goal Outcome Evaluation:  Plan of Care Reviewed With: patient        Progress: no change  Outcome Evaluation: Pt c/o constant pain, PRN dilaudid [id number] PRN as ordered. Pepcid IV added. Kpad ordered and patient got in the shower multiple times to help with abd pain. NG in place, 600cc brown/bloody output so far this shift. Await surgery consult to come and see her. Patient anxious about NG tube and discomfort with that. Chloraseptic spray given. Father at bedside this morning.Strict NPO at this time.

## 2023-12-11 NOTE — ED NOTES
Nursing report ED to floor  Karissa Roach  33 y.o.  female    HPI :   Chief Complaint   Patient presents with    Abdominal Pain    Flank Pain       Admitting doctor:   Alvino Boyd MD    Admitting diagnosis:   The primary encounter diagnosis was Intractable abdominal pain. Diagnoses of Leukocytosis, unspecified type, Acute UTI, and Abnormal abdominal CT scan were also pertinent to this visit.    Code status:   Current Code Status       Date Active Code Status Order ID Comments User Context       Not on file            Allergies:   Inapsine [droperidol]    Isolation:   No active isolations    Intake and Output  No intake or output data in the 24 hours ending 12/10/23 2312    Weight:       12/10/23  1847   Weight: 54.4 kg (120 lb)       Most recent vitals:   Vitals:    12/10/23 2111 12/10/23 2157 12/10/23 2200 12/10/23 2300   BP: 122/76 124/74 110/69 117/90   BP Location: Right arm Left arm Left arm Left arm   Patient Position: Sitting Lying Sitting Lying   Pulse: (!) 124 (!) 123 (!) 125 (!) 128   Resp: 18 18 18 16   Temp:       SpO2: 96% 98% 97% 100%   Weight:       Height:           Active LDAs/IV Access:   Lines, Drains & Airways       Active LDAs       Name Placement date Placement time Site Days    Peripheral IV 12/10/23 1957 Right Antecubital 12/10/23  1957  Antecubital  less than 1    Peripheral IV 12/10/23 2303 Left;Posterior Hand 12/10/23  2303  Hand  less than 1                    Labs (abnormal labs have a star):   Labs Reviewed   COMPREHENSIVE METABOLIC PANEL - Abnormal; Notable for the following components:       Result Value    Glucose 121 (*)     Creatinine 0.54 (*)     ALT (SGPT) 44 (*)     All other components within normal limits    Narrative:     GFR Normal >60  Chronic Kidney Disease <60  Kidney Failure <15     LIPASE - Abnormal; Notable for the following components:    Lipase 7 (*)     All other components within normal limits   URINALYSIS W/ MICROSCOPIC IF INDICATED (NO CULTURE) -  "Abnormal; Notable for the following components:    Appearance, UA Cloudy (*)     pH, UA 8.5 (*)     Blood, UA Trace (*)     Protein, UA Trace (*)     Leuk Esterase, UA Moderate (2+) (*)     All other components within normal limits   CBC WITH AUTO DIFFERENTIAL - Abnormal; Notable for the following components:    WBC 31.26 (*)     Neutrophil % 84.7 (*)     Lymphocyte % 6.5 (*)     Eosinophil % 0.0 (*)     Neutrophils, Absolute 26.48 (*)     Monocytes, Absolute 2.45 (*)     All other components within normal limits   URINALYSIS, MICROSCOPIC ONLY - Abnormal; Notable for the following components:    RBC, UA 3-5 (*)     WBC, UA Too Numerous to Count (*)     Bacteria, UA 1+ (*)     Squamous Epithelial Cells, UA 3-6 (*)     All other components within normal limits   PROCALCITONIN - Abnormal; Notable for the following components:    Procalcitonin 0.38 (*)     All other components within normal limits    Narrative:     As a Marker for Sepsis (Non-Neonates):    1. <0.5 ng/mL represents a low risk of severe sepsis and/or septic shock.  2. >2 ng/mL represents a high risk of severe sepsis and/or septic shock.    As a Marker for Lower Respiratory Tract Infections that require antibiotic therapy:    PCT on Admission    Antibiotic Therapy       6-12 Hrs later    >0.5                Strongly Recommended  >0.25 - <0.5        Recommended   0.1 - 0.25          Discouraged              Remeasure/reassess PCT  <0.1                Strongly Discouraged     Remeasure/reassess PCT    As 28 day mortality risk marker: \"Change in Procalcitonin Result\" (>80% or <=80%) if Day 0 (or Day 1) and Day 4 values are available. Refer to http://www.[facility]-pct-calculator.com    Change in PCT <=80%  A decrease of PCT levels below or equal to 80% defines a positive change in PCT test result representing a higher risk for 28-day all-cause mortality of patients diagnosed with severe sepsis for septic shock.    Change in PCT >80%  A decrease of PCT levels of " more than 80% defines a negative change in PCT result representing a lower risk for 28-day all-cause mortality of patients diagnosed with severe sepsis or septic shock.      HCG, SERUM, QUALITATIVE - Normal   BLOOD CULTURE   BLOOD CULTURE   LACTIC ACID, PLASMA   URINALYSIS W/ CULTURE IF INDICATED   LITHIUM LEVEL   CBC AND DIFFERENTIAL    Narrative:     The following orders were created for panel order CBC & Differential.  Procedure                               Abnormality         Status                     ---------                               -----------         ------                     CBC Auto Differential[[id number]]        Abnormal            Final result                 Please view results for these tests on the individual orders.       EKG:   No orders to display       Meds given in ED:   Medications   lactated ringers infusion (125 mL/hr Intravenous New Bag 12/10/23 2238)   cefTRIAXone (ROCEPHIN) 1,000 mg in sodium chloride 0.9 % 100 mL IVPB-VTB (1,000 mg Intravenous New Bag 12/10/23 2306)   sodium chloride 0.9 % bolus 1,000 mL (0 mL Intravenous Stopped 12/10/23 2117)   ondansetron (ZOFRAN) injection 4 mg (4 mg Intravenous Given 12/10/23 2001)   HYDROmorphone (DILAUDID) injection 0.5 mg (0.5 mg Intravenous Given 12/10/23 2001)   iopamidol (ISOVUE-300) 61 % injection 85 mL (85 mL Intravenous Given 12/10/23 2039)   HYDROmorphone (DILAUDID) injection 0.5 mg (0.5 mg Intravenous Given 12/10/23 2123)   lactated ringers bolus 1,000 mL ( Intravenous Currently Infusing 12/10/23 2311)   ketorolac (TORADOL) injection 15 mg (15 mg Intravenous Given 12/10/23 2231)   droperidol (INAPSINE) injection 2.5 mg (2.5 mg Intravenous Given 12/10/23 2231)   diphenhydrAMINE (BENADRYL) injection 50 mg (50 mg Intravenous Given 12/10/23 2259)       Imaging results:  CT Abdomen Pelvis With Contrast    Result Date: 12/10/2023   1. Stomach appears mildly distended, and there are some patulous loops of small bowel within the pelvis.  Nonspecific gastroenteritis is not excluded. The appendix is normal, as is the terminal ileum.  Radiation dose reduction techniques were utilized, including automated exposure control and exposure modulation based on body size.   This report was finalized on 12/10/2023 9:42 PM by Dr. Nadine Trevino M.D on Workstation: [id number]       Ambulatory status:   - as tolerated    Social issues:   Social History     Socioeconomic History    Marital status: Single   Tobacco Use    Smoking status: Every Day     Packs/day: 1     Types: Cigarettes    Smokeless tobacco: Never   Vaping Use    Vaping Use: Never used   Substance and Sexual Activity    Alcohol use: Never     Comment: sober    Drug use: No    Sexual activity: Defer       NIH Stroke Scale:       Tigre Lemus RN  12/10/23 23:12 EST

## 2023-12-11 NOTE — PROGRESS NOTES
Discharge Planning Assessment  [facility]     Patient Name: Karissa Roach  MRN: [id number]  Today's Date: 12/11/2023    Admit Date: 12/10/2023    Plan: Home   Discharge Needs Assessment       Row Name 12/11/23 1637       Living Environment    People in Home child(johnnie), dependent    Current Living Arrangements home    Potentially Unsafe Housing Conditions none    In the past 12 months has the electric, gas, oil, or water company threatened to shut off services in your home? No    Primary Care Provided by self    Provides Primary Care For child(johnnie)    Family Caregiver if Needed parent(s);sibling(s)    Family Caregiver Names Dad, Nolan and sister, Samia    Quality of Family Relationships involved;helpful;supportive    Able to Return to Prior Arrangements yes       Resource/Environmental Concerns    Resource/Environmental Concerns none    Transportation Concerns none       Food Insecurity    Within the past 12 months, you worried that your food would run out before you got the money to buy more. Never true    Within the past 12 months, the food you bought just didn't last and you didn't have money to get more. Never true       Transition Planning    Patient/Family Anticipates Transition to home with family    Patient/Family Anticipated Services at Transition none    Transportation Anticipated family or friend will provide       Discharge Needs Assessment    Readmission Within the Last 30 Days no previous admission in last 30 days    Equipment Currently Used at Home none    Concerns to be Addressed adjustment to diagnosis/illness    Equipment Needed After Discharge none    Provided Post Acute Provider List? N/A    Provided Post Acute Provider Quality & Resource List? N/A                   Discharge Plan       Row Name 12/11/23 1640       Plan    Plan Home    Plan Comments Met with patient at the bedside verified facesheet. Patient lives with her son; pt's sister and dad are caring for pt's son while she is in  the hospital. Patient works full time, she is IADL, no DME. PCP is Dr. Tigre Esquivel, [facility]. Patient is planning to return home with the help of her family. She denies dc needs at this time. Family will transport home.                  Continued Care and Services - Admitted Since 12/10/2023    Coordination has not been started for this encounter.       Expected Discharge Date and Time       Expected Discharge Date Expected Discharge Time    Dec 13, 2023            Demographic Summary    No documentation.                  Functional Status       Row Name 12/11/23 1640       Functional Status    Usual Activity Tolerance good       Assessment of Health Literacy    Health Literacy Moderate       Functional Status, IADL    Medications independent    Meal Preparation independent    Housekeeping independent    Laundry independent    Shopping independent       Mental Status    General Appearance WDL WDL       Mental Status Summary    Recent Changes in Mental Status/Cognitive Functioning no changes                   Psychosocial    No documentation.                  Abuse/Neglect    No documentation.                  Legal    No documentation.                  Substance Abuse    No documentation.                  Patient Forms    No documentation.                     Ida Monroe RN

## 2023-12-12 VITALS
DIASTOLIC BLOOD PRESSURE: 79 MMHG | TEMPERATURE: 98.6 F | HEIGHT: 60 IN | RESPIRATION RATE: 19 BRPM | OXYGEN SATURATION: 98 % | SYSTOLIC BLOOD PRESSURE: 116 MMHG | HEART RATE: 105 BPM | BODY MASS INDEX: 26.76 KG/M2 | WEIGHT: 136.29 LBS

## 2023-12-12 PROBLEM — K52.9 ACUTE GASTROENTERITIS: Status: ACTIVE | Noted: 2023-12-12

## 2023-12-12 PROBLEM — N30.00 ACUTE CYSTITIS WITHOUT HEMATURIA: Status: ACTIVE | Noted: 2023-12-12

## 2023-12-12 LAB — BACTERIA SPEC AEROBE CULT: NORMAL

## 2023-12-12 PROCEDURE — 25810000003 LACTATED RINGERS PER 1000 ML: Performed by: EMERGENCY MEDICINE

## 2023-12-12 PROCEDURE — 25010000002 ONDANSETRON PER 1 MG: Performed by: NURSE PRACTITIONER

## 2023-12-12 PROCEDURE — 99231 SBSQ HOSP IP/OBS SF/LOW 25: CPT | Performed by: SURGERY

## 2023-12-12 PROCEDURE — 25010000002 HYDROMORPHONE PER 4 MG: Performed by: HOSPITALIST

## 2023-12-12 RX ADMIN — HYDROMORPHONE HYDROCHLORIDE 0.5 MG: 1 INJECTION, SOLUTION INTRAMUSCULAR; INTRAVENOUS; SUBCUTANEOUS at 01:39

## 2023-12-12 RX ADMIN — FAMOTIDINE 20 MG: 10 INJECTION INTRAVENOUS at 09:25

## 2023-12-12 RX ADMIN — HYDROMORPHONE HYDROCHLORIDE 0.5 MG: 1 INJECTION, SOLUTION INTRAMUSCULAR; INTRAVENOUS; SUBCUTANEOUS at 11:36

## 2023-12-12 RX ADMIN — HYDROMORPHONE HYDROCHLORIDE 0.5 MG: 1 INJECTION, SOLUTION INTRAMUSCULAR; INTRAVENOUS; SUBCUTANEOUS at 04:33

## 2023-12-12 RX ADMIN — ONDANSETRON 4 MG: 2 INJECTION INTRAMUSCULAR; INTRAVENOUS at 01:45

## 2023-12-12 RX ADMIN — Medication 10 ML: at 09:27

## 2023-12-12 RX ADMIN — HYDROMORPHONE HYDROCHLORIDE 0.5 MG: 1 INJECTION, SOLUTION INTRAMUSCULAR; INTRAVENOUS; SUBCUTANEOUS at 18:37

## 2023-12-12 RX ADMIN — SODIUM CHLORIDE, POTASSIUM CHLORIDE, SODIUM LACTATE AND CALCIUM CHLORIDE 125 ML/HR: 600; 310; 30; 20 INJECTION, SOLUTION INTRAVENOUS at 13:40

## 2023-12-12 RX ADMIN — HYDROMORPHONE HYDROCHLORIDE 0.5 MG: 1 INJECTION, SOLUTION INTRAMUSCULAR; INTRAVENOUS; SUBCUTANEOUS at 14:40

## 2023-12-12 RX ADMIN — HYDROMORPHONE HYDROCHLORIDE 0.5 MG: 1 INJECTION, SOLUTION INTRAMUSCULAR; INTRAVENOUS; SUBCUTANEOUS at 07:52

## 2023-12-12 RX ADMIN — ONDANSETRON 4 MG: 2 INJECTION INTRAMUSCULAR; INTRAVENOUS at 11:36

## 2023-12-12 NOTE — PAYOR COMM NOTE
"Blayne Salazar (33 y.o. Female)          INPATIENT REQUEST FOR [id number]   THE PATIENT WAS OBS 12/10 AND CONVERTED TO INPATIENT 12/12/23  DX - R10.9 (ICD-10-CM) - Intractable abdominal pain     [facility] NPI - [id number]  ATTENDING MD -  BENNETT QUEVEDO,  NPI - [id number]     CONTACT SAJI SILVA  P# [phone]  F# [phone]         Date of Birth   1990    Social Security Number       Address   [address] [place] [address]    Home Phone   [phone]    MRN   [id number]       Denominational   [employer]ar[employer]    Marital Status   Single                            Admission Date   12/10/23    Admission Type   Emergency    Admitting Provider   Alvino Boyd MD    Attending Provider   Bennett Quevedo MD    Department, Room/Bed   [address], E465/1       Discharge Date       Discharge Disposition       Discharge Destination                                 Attending Provider: Bennett Quevedo MD    Allergies: Inapsine [Droperidol]    Isolation: None   Infection: None   Code Status: CPR    Ht: 152.4 cm (60\")   Wt: 61.8 kg (136 lb 4.6 oz)    Admission Cmt: None   Principal Problem: Acute gastroenteritis [K52.9]                   Active Insurance as of 12/10/2023       Primary Coverage       Payor Plan Insurance Group Employer/Plan Group    [facility] [place] [facility] [place] [id number]       Payor Plan Address Payor Plan Phone Number Payor Plan Fax Number Effective Dates    [address] [address]   1/1/2021 - None Entered    [facility] [phone]         Subscriber Name Subscriber Birth Date Member ID       BLAYNE SALAZAR 1990 [id number]                     Emergency Contacts        (Rel.) Home Phone Work Phone Mobile Phone    Samia Salazar (Sister) -- -- [phone]    STEVO SALAZAR (Father) [phone] -- [phone]    Faye Fletcher (Mother) -- -- [phone]                 History & Physical        Alvino Boyd MD at 12/11/23 0821        "   HISTORY AND PHYSICAL   [facility]        Date of Admission: 12/10/2023  Patient Identification:  Name: Karissa Roach  Age: 33 y.o.  Sex: female  :  1990  MRN: [id number]                     Primary Care Physician: Provider, No Known    Chief Complaint: Acute abdominal pain    History of Present Illness:    is a pleasant 33-year-old female presenting with acute right upper quadrant abdominal pain.  She is unaware of any aggravating factors and this became an acute issue over the last 24 hours.  She claims the pain is worse with movement and she is also had associated nausea with dry heaves into the point of emesis but she denies any full-blown emesis.  She denies any fever or chills.  In the ER CT of the abdomen pelvis showed gastric distention in which NG tube was placed and patient has noted improvement with her NG tube in regards to her abdominal pain though she is still clutching her right upper quadrant.  She has never had any issues like this from involving her GI track her gut.  She denies any dysuria there urinalysis seems consistent with a UTI patient was initiated on Rocephin.  Her abdominal pain is confined to her right upper quadrant and epigastrium and she is not having any CVA distribution/pain.  Surgeon  was notified through the ER and will see patient in consult today.  In the ER patient received Benadryl secondary to Inapsine dystonia, Toradol, IVF, Zofran.    Past Medical History:  Past Medical History:   Diagnosis Date    Anemia      Past Surgical History:  Past Surgical History:   Procedure Laterality Date    ABDOMINAL SURGERY      D & C WITH SUCTION N/A 2018    Procedure: DILATATION AND CURETTAGE WITH SUCTION;  Surgeon: Jose Mobley MD;  Location: [facility];  Service: Obstetrics/Gynecology    OTHER SURGICAL HISTORY      removal of endometriosis in       Home Meds:  Medications Prior to Admission   Medication Sig Dispense Refill Last  "Dose    LITHIUM PO Take 50 mg by mouth Daily.          Allergies:  Allergies   Allergen Reactions    Inapsine [Droperidol] Dystonia     Immunizations:  Immunization History   Administered Date(s) Administered    Rho (D) Immune Globulin 2018     Social History:   Social History     Social History Narrative    Not on file     Social History     Socioeconomic History    Marital status: Single   Tobacco Use    Smoking status: Every Day     Packs/day: 1     Types: Cigarettes    Smokeless tobacco: Never   Vaping Use    Vaping Use: Never used   Substance and Sexual Activity    Alcohol use: Never     Comment: sober    Drug use: No    Sexual activity: Defer       Family History:  Family History   Problem Relation Age of Onset    Malig Hyperthermia Neg Hx         Review of Systems patient denies fever chills night sweats.  Admits to nausea and near emesis.  Admits to abdominal pain.  Denies any sick contacts chest pain upper respiratory style illness, shortness of breath palpitation.  Denies any loss conscious or focal loss of function.  Denies any dysuria bruising bleeding.  Remainder of ROS is negative.    Objective:  T Max 24 hrs: Temp (24hrs), Av.4 °F (36.9 °C), Min:98.2 °F (36.8 °C), Max:98.6 °F (37 °C)    Vitals Ranges:   Temp:  [98.2 °F (36.8 °C)-98.6 °F (37 °C)] 98.2 °F (36.8 °C)  Heart Rate:  [114-128] 118  Resp:  [16-18] 18  BP: (110-139)/(69-98) 139/98      Exam:  /98 (BP Location: Right arm, Patient Position: Lying)   Pulse 118   Temp 98.2 °F (36.8 °C) (Oral)   Resp 18   Ht 152.4 cm (60\")   Wt 61.8 kg (136 lb 4.6 oz)   LMP 2023   SpO2 100%   BMI 26.62 kg/m²     General Appearance:    Alert, cooperative, sickly/dehydrated/conversational and otherwise pleasant with NG tube in place.  No family present at bedside   Head:    Normocephalic, without obvious abnormality, atraumatic   Eyes:    PERRL, conjunctivae/corneas clear, EOM's intact, both eyes   Ears:    Normal external ear canals, " both ears   Nose:   Nares normal, septum midline, mucosa normal, no drainage    or sinus tenderness   Throat:   Lips, mucosa, and tongue normal though mucous membranes are quite dry   Neck:   Supple, no meningismus or JVD   Back:     No CVA tenderness   Lungs:     Clear to auscultation bilaterally, respirations unlabored   Chest Wall:    No tenderness or deformity    Heart:    Regular/tachycardic rate and rhythm, S1 and S2 normal   Abdomen:     Soft, nondistended, bowel sounds were noted though little hypoactive, NG with black liquid output in canister~400cc currently, mild guarding but abdomen is not rigid   Extremities: Moving all, no cyanosis or edema   Pulses:   2+ and symmetric all extremities           Neurologic:   CNII-XII intact, no focal deficit      .    Data Review:  Labs in chart were reviewed.             Imaging Results (All)       Procedure Component Value Units Date/Time    XR Abdomen KUB [[id number]] Collected: 12/11/23 0001     Updated: 12/11/23 0006    Narrative:      KUB     HISTORY: Nasogastric tube placement     COMPARISON: None available.     FINDINGS:  Nasogastric tube appears to extend into the proximal body of the  stomach. No free air is seen. There is some gaseous distention of bowel  loops within the upper abdomen.           This report was finalized on 12/11/2023 12:03 AM by Dr. Nadine Trevino M.D on Workstation: [id number]       XR Chest 1 View [[id number]] Resulted: 12/10/23 2345     Updated: 12/10/23 2345    CT Abdomen Pelvis With Contrast [[id number]] Collected: 12/10/23 2133     Updated: 12/10/23 2145    Narrative:      CT OF THE ABDOMEN AND PELVIS WITH CONTRAST     HISTORY: Nausea and vomiting. Right-sided abdominal pain.     COMPARISON: May 14, 2021     TECHNIQUE: Axial CT imaging was obtained through the abdomen and pelvis.  IV contrast was administered.     FINDINGS:  Images through the lung bases demonstrate dependent atelectasis and/or  scarring. No suspicious hepatic  lesions are seen. Spleen, pancreas,  gallbladder, and duodenum appear normal. Stomach does appear mildly  distended. The liver is enlarged, measuring up to 19.9 cm in  craniocaudal dimensions. It was enlarged on the prior study. The kidneys  enhance symmetrically. There is no hydronephrosis. No distal ureteral or  bladder stones are seen. Uterus is normal. The appendix is normal. I  don't see any evidence of small bowel obstruction. Terminal ileum  appears unremarkable, although there are some patulous loops of small  bowel within the pelvis. No acute osseous abnormalities are seen. Small  cyst is noted on the left ovary, measuring 1.2 x 1.1 cm. Right ovary is  unremarkable.       Impression:         1. Stomach appears mildly distended, and there are some patulous loops  of small bowel within the pelvis. Nonspecific gastroenteritis is not  excluded. The appendix is normal, as is the terminal ileum.     Radiation dose reduction techniques were utilized, including automated  exposure control and exposure modulation based on body size.        This report was finalized on 12/10/2023 9:42 PM by Dr. Nadine Trevino M.D on Workstation: [id number]                 Assessment:  Active Hospital Problems    Diagnosis  POA    **Acute distention of stomach [K31.0]  Unknown    Leukocytosis [D72.829]  Yes    Abdominal pain [R10.9]  Yes    Psychoactive substance dependence [F19.20]  Yes    Posttraumatic stress disorder [F43.10]  Yes      Resolved Hospital Problems   No resolved problems to display.       Plan:    CT abdomen pelvis revealed acute distention of the stomach and patient is currently n.p.o. with IV fluids antiemetics and IV pain medication with NG tube until surgery can evaluate   -Defer NG tube and dietary advancement to surgery   -IV Pepcid added   -Etiology questionable -possible GE per CT report      Caution with use of narcotics given patient's past medical history of polysubstance abuse/dependence   -Tox  screen positive for amphetamine      Marked leukocytosis likely all reactive -lactate normal and no recent fevers   -Possible UTI on Rocephin   -Culture pending as are blood cultures x 2.  RVP negative    Reactive tachycardia -improving with IVF though clinically still remains quite dry    Depression anxiety and PTSD will need resumption of lithium once dietary advancement noted.  If further medications needed in the interim, consider psychiatric consultation    SCDs for prophylaxis        Further recommendations to follow as clinical course unfolds    Alvino Boyd MD  12/11/2023  08:22 EST      Electronically signed by Alvino Boyd MD at 12/11/23 0831          Emergency Department Notes        Tigre Lemus RN at 12/10/23 2311          Nursing report ED to floor  Karissa Roach  33 y.o.  female    HPI :   Chief Complaint   Patient presents with    Abdominal Pain    Flank Pain       Admitting doctor:   Alvino Boyd MD    Admitting diagnosis:   The primary encounter diagnosis was Intractable abdominal pain. Diagnoses of Leukocytosis, unspecified type, Acute UTI, and Abnormal abdominal CT scan were also pertinent to this visit.    Code status:   Current Code Status       Date Active Code Status Order ID Comments User Context       Not on file            Allergies:   Inapsine [droperidol]    Isolation:   No active isolations    Intake and Output  No intake or output data in the 24 hours ending 12/10/23 2312    Weight:       12/10/23  1847   Weight: 54.4 kg (120 lb)       Most recent vitals:   Vitals:    12/10/23 2111 12/10/23 2157 12/10/23 2200 12/10/23 2300   BP: 122/76 124/74 110/69 117/90   BP Location: Right arm Left arm Left arm Left arm   Patient Position: Sitting Lying Sitting Lying   Pulse: (!) 124 (!) 123 (!) 125 (!) 128   Resp: 18 18 18 16   Temp:       SpO2: 96% 98% 97% 100%   Weight:       Height:           Active LDAs/IV Access:   Lines, Drains & Airways       Active LDAs       Name  Placement date Placement time Site Days    Peripheral IV 12/10/23 1957 Right Antecubital 12/10/23  1957  Antecubital  less than 1    Peripheral IV 12/10/23 2303 Left;Posterior Hand 12/10/23  2303  Hand  less than 1                    Labs (abnormal labs have a star):   Labs Reviewed   COMPREHENSIVE METABOLIC PANEL - Abnormal; Notable for the following components:       Result Value    Glucose 121 (*)     Creatinine 0.54 (*)     ALT (SGPT) 44 (*)     All other components within normal limits    Narrative:     GFR Normal >60  Chronic Kidney Disease <60  Kidney Failure <15     LIPASE - Abnormal; Notable for the following components:    Lipase 7 (*)     All other components within normal limits   URINALYSIS W/ MICROSCOPIC IF INDICATED (NO CULTURE) - Abnormal; Notable for the following components:    Appearance, UA Cloudy (*)     pH, UA 8.5 (*)     Blood, UA Trace (*)     Protein, UA Trace (*)     Leuk Esterase, UA Moderate (2+) (*)     All other components within normal limits   CBC WITH AUTO DIFFERENTIAL - Abnormal; Notable for the following components:    WBC 31.26 (*)     Neutrophil % 84.7 (*)     Lymphocyte % 6.5 (*)     Eosinophil % 0.0 (*)     Neutrophils, Absolute 26.48 (*)     Monocytes, Absolute 2.45 (*)     All other components within normal limits   URINALYSIS, MICROSCOPIC ONLY - Abnormal; Notable for the following components:    RBC, UA 3-5 (*)     WBC, UA Too Numerous to Count (*)     Bacteria, UA 1+ (*)     Squamous Epithelial Cells, UA 3-6 (*)     All other components within normal limits   PROCALCITONIN - Abnormal; Notable for the following components:    Procalcitonin 0.38 (*)     All other components within normal limits    Narrative:     As a Marker for Sepsis (Non-Neonates):    1. <0.5 ng/mL represents a low risk of severe sepsis and/or septic shock.  2. >2 ng/mL represents a high risk of severe sepsis and/or septic shock.    As a Marker for Lower Respiratory Tract Infections that require antibiotic  "therapy:    PCT on Admission    Antibiotic Therapy       6-12 Hrs later    >0.5                Strongly Recommended  >0.25 - <0.5        Recommended   0.1 - 0.25          Discouraged              Remeasure/reassess PCT  <0.1                Strongly Discouraged     Remeasure/reassess PCT    As 28 day mortality risk marker: \"Change in Procalcitonin Result\" (>80% or <=80%) if Day 0 (or Day 1) and Day 4 values are available. Refer to http://www.[facility] [place] [facility] to the [facility]-pct-calculator.com    Change in PCT <=80%  A decrease of PCT levels below or equal to 80% defines a positive change in PCT test result representing a higher risk for 28-day all-cause mortality of patients diagnosed with severe sepsis for septic shock.    Change in PCT >80%  A decrease of PCT levels of more than 80% defines a negative change in PCT result representing a lower risk for 28-day all-cause mortality of patients diagnosed with severe sepsis or septic shock.      HCG, SERUM, QUALITATIVE - Normal   BLOOD CULTURE   BLOOD CULTURE   LACTIC ACID, PLASMA   URINALYSIS W/ CULTURE IF INDICATED   LITHIUM LEVEL   CBC AND DIFFERENTIAL    Narrative:     The following orders were created for panel order CBC & Differential.  Procedure                               Abnormality         Status                     ---------                               -----------         ------                     CBC Auto Differential[[id number]]        Abnormal            Final result                 Please view results for these tests on the individual orders.       EKG:   No orders to display       Meds given in ED:   Medications   lactated ringers infusion (125 mL/hr Intravenous New Bag 12/10/23 2238)   cefTRIAXone (ROCEPHIN) 1,000 mg in sodium chloride 0.9 % 100 mL IVPB-VTB (1,000 mg Intravenous New Bag 12/10/23 2306)   sodium chloride 0.9 % bolus 1,000 mL (0 mL Intravenous Stopped 12/10/23 2117)   ondansetron (ZOFRAN) injection 4 mg (4 mg Intravenous Given 12/10/23 2001)   HYDROmorphone " (DILAUDID) injection 0.5 mg (0.5 mg Intravenous Given 12/10/23 2001)   iopamidol (ISOVUE-300) 61 % injection 85 mL (85 mL Intravenous Given 12/10/23 2039)   HYDROmorphone (DILAUDID) injection 0.5 mg (0.5 mg Intravenous Given 12/10/23 2123)   lactated ringers bolus 1,000 mL ( Intravenous Currently Infusing 12/10/23 2311)   ketorolac (TORADOL) injection 15 mg (15 mg Intravenous Given 12/10/23 2231)   droperidol (INAPSINE) injection 2.5 mg (2.5 mg Intravenous Given 12/10/23 2231)   diphenhydrAMINE (BENADRYL) injection 50 mg (50 mg Intravenous Given 12/10/23 2259)       Imaging results:  CT Abdomen Pelvis With Contrast    Result Date: 12/10/2023   1. Stomach appears mildly distended, and there are some patulous loops of small bowel within the pelvis. Nonspecific gastroenteritis is not excluded. The appendix is normal, as is the terminal ileum.  Radiation dose reduction techniques were utilized, including automated exposure control and exposure modulation based on body size.   This report was finalized on 12/10/2023 9:42 PM by Dr. Nadine Trevino M.D on Workstation: [id number]       Ambulatory status:   - as tolerated    Social issues:   Social History     Socioeconomic History    Marital status: Single   Tobacco Use    Smoking status: Every Day     Packs/day: 1     Types: Cigarettes    Smokeless tobacco: Never   Vaping Use    Vaping Use: Never used   Substance and Sexual Activity    Alcohol use: Never     Comment: sober    Drug use: No    Sexual activity: Defer       NIH Stroke Scale:       Tigre Lemus RN  12/10/23 23:12 EST          Electronically signed by Tigre Lemus RN at 12/10/23 2312       Indira Silverio, RN at 12/10/23 2212          Patient c/o increasing pain; having a hard time getting comfortable. Patient states that her stomach muscles are tightening up and causing her to have chest pain and trouble breathing. Dr. Butts notified.     Electronically signed by Indira Silverio, RN at 12/10/23 2212        Tarik Butts MD at 12/10/23 1856           EMERGENCY DEPARTMENT ENCOUNTER    Room Number:  E465/1  Date seen:  12/11/2023  PCP: Provider, No Known  Historian: Patient and EMS      HPI:  Chief Complaint: Right-sided abdominal pain  Context: Karissa Roach is a 33 y.o. female who presents to the ED c/o right sided abdominal pain that radiates into her pelvis and right flank that began last night but has become worse today.  The patient states that the pain is worse with any movement.  She states she has had nausea and feels like she is going to vomit now.  She denies diarrhea or dysuria.  She denies fevers or chills.  Patient states she has had laparoscopic surgery in the past but no history of kidney stones.      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       PAST SURGICAL HISTORY  Past Surgical History:   Procedure Laterality Date    ABDOMINAL SURGERY      D & C WITH SUCTION N/A 9/20/2018    Procedure: DILATATION AND CURETTAGE WITH SUCTION;  Surgeon: Jose Mobley MD;  Location: [facility];  Service: Obstetrics/Gynecology    OTHER SURGICAL HISTORY      removal of endometriosis in 2014         FAMILY HISTORY  Family History   Problem Relation Age of Onset    Malig Hyperthermia Neg Hx          SOCIAL HISTORY  Social History     Socioeconomic History    Marital status: Single   Tobacco Use     Smoking status: Every Day     Packs/day: 1     Types: Cigarettes    Smokeless tobacco: Never   Vaping Use    Vaping Use: Never used   Substance and Sexual Activity    Alcohol use: Never     Comment: sober    Drug use: No    Sexual activity: Defer         ALLERGIES  Inapsine [droperidol]      PHYSICAL EXAM  ED Triage Vitals [12/10/23 1847]   Temp Heart Rate Resp BP SpO2   98.6 °F (37 °C) 115 18 116/79 98 %      Temp src Heart Rate Source Patient Position BP Location FiO2 (%)   -- -- -- -- --       Physical Exam      GENERAL: 33-year-old female in mild distress  HENT: NCAT: nares patent: Neck supple  EYES: no scleral icterus  CV: regular rhythm, normal rate  RESPIRATORY: normal effort  ABDOMEN: soft, right side abdominal pain with guarding but no rebound and diminished bowel sounds: No CVA tenderness  MUSCULOSKELETAL: no deformity  NEURO: alert with nonfocal neuro exam  PSYCH:  calm, cooperative  SKIN: warm, dry    Vital signs and nursing notes reviewed.      LAB RESULTS  Recent Results (from the past 24 hour(s))   Comprehensive Metabolic Panel    Collection Time: 12/10/23  7:57 PM    Specimen: Blood   Result Value Ref Range    Glucose 121 (H) 65 - 99 mg/dL    BUN 9 6 - 20 mg/dL    Creatinine 0.54 (L) 0.57 - 1.00 mg/dL    Sodium 137 136 - 145 mmol/L    Potassium 3.9 3.5 - 5.2 mmol/L    Chloride 99 98 - 107 mmol/L    CO2 26.0 22.0 - 29.0 mmol/L    Calcium 9.3 8.6 - 10.5 mg/dL    Total Protein 7.2 6.0 - 8.5 g/dL    Albumin 4.7 3.5 - 5.2 g/dL    ALT (SGPT) 44 (H) 1 - 33 U/L    AST (SGOT) 21 1 - 32 U/L    Alkaline Phosphatase 82 39 - 117 U/L    Total Bilirubin 0.4 0.0 - 1.2 mg/dL    Globulin 2.5 gm/dL    A/G Ratio 1.9 g/dL    BUN/Creatinine Ratio 16.7 7.0 - 25.0    Anion Gap 12.0 5.0 - 15.0 mmol/L    eGFR 125.6 >60.0 mL/min/1.73   Lipase    Collection Time: 12/10/23  7:57 PM    Specimen: Blood   Result Value Ref Range    Lipase 7 (L) 13 - 60 U/L   hCG, Serum, Qualitative    Collection Time: 12/10/23  7:57 PM     Specimen: Blood   Result Value Ref Range    HCG Qualitative Negative Negative   CBC Auto Differential    Collection Time: 12/10/23  7:57 PM    Specimen: Blood   Result Value Ref Range    WBC 31.26 (C) 3.40 - 10.80 10*3/mm3    RBC 4.31 3.77 - 5.28 10*6/mm3    Hemoglobin 13.9 12.0 - 15.9 g/dL    Hematocrit 41.5 34.0 - 46.6 %    MCV 96.3 79.0 - 97.0 fL    MCH 32.3 26.6 - 33.0 pg    MCHC 33.5 31.5 - 35.7 g/dL    RDW 12.7 12.3 - 15.4 %    RDW-SD 45.6 37.0 - 54.0 fl    MPV 9.3 6.0 - 12.0 fL    Platelets 346 140 - 450 10*3/mm3    Neutrophil % 84.7 (H) 42.7 - 76.0 %    Lymphocyte % 6.5 (L) 19.6 - 45.3 %    Monocyte % 7.8 5.0 - 12.0 %    Eosinophil % 0.0 (L) 0.3 - 6.2 %    Basophil % 0.2 0.0 - 1.5 %    Neutrophils, Absolute 26.48 (H) 1.70 - 7.00 10*3/mm3    Lymphocytes, Absolute 2.02 0.70 - 3.10 10*3/mm3    Monocytes, Absolute 2.45 (H) 0.10 - 0.90 10*3/mm3    Eosinophils, Absolute 0.00 0.00 - 0.40 10*3/mm3    Basophils, Absolute 0.07 0.00 - 0.20 10*3/mm3   Procalcitonin    Collection Time: 12/10/23  7:57 PM    Specimen: Blood   Result Value Ref Range    Procalcitonin 0.38 (H) 0.00 - 0.25 ng/mL   Urinalysis With Microscopic If Indicated (No Culture) - Urine, Clean Catch    Collection Time: 12/10/23  9:57 PM    Specimen: Urine, Clean Catch   Result Value Ref Range    Color, UA Yellow Yellow, Straw    Appearance, UA Cloudy (A) Clear    pH, UA 8.5 (H) 5.0 - 8.0    Specific Gravity, UA >=1.030 1.005 - 1.030    Glucose, UA Negative Negative    Ketones, UA Negative Negative    Bilirubin, UA Negative Negative    Blood, UA Trace (A) Negative    Protein, UA Trace (A) Negative    Leuk Esterase, UA Moderate (2+) (A) Negative    Nitrite, UA Negative Negative    Urobilinogen, UA 0.2 E.U./dL 0.2 - 1.0 E.U./dL   Urinalysis, Microscopic Only - Urine, Clean Catch    Collection Time: 12/10/23  9:57 PM    Specimen: Urine, Clean Catch   Result Value Ref Range    RBC, UA 3-5 (A) None Seen, 0-2 /HPF    WBC, UA Too Numerous to Count (A) None Seen,  0-2 /HPF    Bacteria, UA 1+ (A) None Seen /HPF    Squamous Epithelial Cells, UA 3-6 (A) None Seen, 0-2 /HPF    Hyaline Casts, UA 0-2 None Seen /LPF    Methodology Automated Microscopy    Urine Drug Screen - Urine, Clean Catch    Collection Time: 12/10/23  9:57 PM    Specimen: Urine, Clean Catch   Result Value Ref Range    Amphet/Methamphet, Screen Positive (A) Negative    Barbiturates Screen, Urine Negative Negative    Benzodiazepine Screen, Urine Negative Negative    Cocaine Screen, Urine Negative Negative    Opiate Screen Negative Negative    THC, Screen, Urine Negative Negative    Methadone Screen, Urine Negative Negative    Oxycodone Screen, Urine Negative Negative    Fentanyl, Urine Negative Negative   Lactic Acid, Plasma    Collection Time: 12/10/23 10:56 PM    Specimen: Blood   Result Value Ref Range    Lactate 1.4 0.5 - 2.0 mmol/L   Lithium Level    Collection Time: 12/10/23 10:56 PM    Specimen: Blood   Result Value Ref Range    Lithium <0.1 (L) 0.6 - 1.2 mmol/L   Respiratory Panel PCR w/COVID-19(SARS-CoV-2) EAMON/RICARDO/MARIETTA/PAD/COR/MINNIE In-House, NP Swab in UTM/VTM, 2 HR TAT - Swab, Nasopharynx    Collection Time: 12/11/23  1:00 AM    Specimen: Nasopharynx; Swab   Result Value Ref Range    ADENOVIRUS, PCR Not Detected Not Detected    Coronavirus 229E Not Detected Not Detected    Coronavirus HKU1 Not Detected Not Detected    Coronavirus NL63 Not Detected Not Detected    Coronavirus OC43 Not Detected Not Detected    COVID19 Not Detected Not Detected - Ref. Range    Human Metapneumovirus Not Detected Not Detected    Human Rhinovirus/Enterovirus Not Detected Not Detected    Influenza A PCR Not Detected Not Detected    Influenza B PCR Not Detected Not Detected    Parainfluenza Virus 1 Not Detected Not Detected    Parainfluenza Virus 2 Not Detected Not Detected    Parainfluenza Virus 3 Not Detected Not Detected    Parainfluenza Virus 4 Not Detected Not Detected    RSV, PCR Not Detected Not Detected    Bordetella  pertussis pcr Not Detected Not Detected    Bordetella parapertussis PCR Not Detected Not Detected    Chlamydophila pneumoniae PCR Not Detected Not Detected    Mycoplasma pneumo by PCR Not Detected Not Detected   Basic Metabolic Panel    Collection Time: 12/11/23  2:57 AM    Specimen: Blood   Result Value Ref Range    Glucose 104 (H) 65 - 99 mg/dL    BUN 6 6 - 20 mg/dL    Creatinine 0.47 (L) 0.57 - 1.00 mg/dL    Sodium 137 136 - 145 mmol/L    Potassium 4.1 3.5 - 5.2 mmol/L    Chloride 103 98 - 107 mmol/L    CO2 24.1 22.0 - 29.0 mmol/L    Calcium 8.7 8.6 - 10.5 mg/dL    BUN/Creatinine Ratio 12.8 7.0 - 25.0    Anion Gap 9.9 5.0 - 15.0 mmol/L    eGFR 129.9 >60.0 mL/min/1.73   CBC (No Diff)    Collection Time: 12/11/23  2:57 AM    Specimen: Blood   Result Value Ref Range    WBC 27.35 (H) 3.40 - 10.80 10*3/mm3    RBC 3.95 3.77 - 5.28 10*6/mm3    Hemoglobin 12.3 12.0 - 15.9 g/dL    Hematocrit 36.9 34.0 - 46.6 %    MCV 93.4 79.0 - 97.0 fL    MCH 31.1 26.6 - 33.0 pg    MCHC 33.3 31.5 - 35.7 g/dL    RDW 12.1 (L) 12.3 - 15.4 %    RDW-SD 41.8 37.0 - 54.0 fl    MPV 9.2 6.0 - 12.0 fL    Platelets 299 140 - 450 10*3/mm3       Ordered the above labs and reviewed the results.        RADIOLOGY  XR Abdomen KUB    Result Date: 12/11/2023  KUB  HISTORY: Nasogastric tube placement  COMPARISON: None available.  FINDINGS: Nasogastric tube appears to extend into the proximal body of the stomach. No free air is seen. There is some gaseous distention of bowel loops within the upper abdomen.    This report was finalized on 12/11/2023 12:03 AM by Dr. Nadine Trevino M.D on Workstation: [id number]      CT Abdomen Pelvis With Contrast    Result Date: 12/10/2023  CT OF THE ABDOMEN AND PELVIS WITH CONTRAST  HISTORY: Nausea and vomiting. Right-sided abdominal pain.  COMPARISON: May 14, 2021  TECHNIQUE: Axial CT imaging was obtained through the abdomen and pelvis. IV contrast was administered.  FINDINGS: Images through the lung bases demonstrate  dependent atelectasis and/or scarring. No suspicious hepatic lesions are seen. Spleen, pancreas, gallbladder, and duodenum appear normal. Stomach does appear mildly distended. The liver is enlarged, measuring up to 19.9 cm in craniocaudal dimensions. It was enlarged on the prior study. The kidneys enhance symmetrically. There is no hydronephrosis. No distal ureteral or bladder stones are seen. Uterus is normal. The appendix is normal. I don't see any evidence of small bowel obstruction. Terminal ileum appears unremarkable, although there are some patulous loops of small bowel within the pelvis. No acute osseous abnormalities are seen. Small cyst is noted on the left ovary, measuring 1.2 x 1.1 cm. Right ovary is unremarkable.       1. Stomach appears mildly distended, and there are some patulous loops of small bowel within the pelvis. Nonspecific gastroenteritis is not excluded. The appendix is normal, as is the terminal ileum.  Radiation dose reduction techniques were utilized, including automated exposure control and exposure modulation based on body size.   This report was finalized on 12/10/2023 9:42 PM by Dr. Nadine Trevino M.D on Workstation: [id number]       Ordered the above noted radiological studies. Reviewed by me in PACS.            PROCEDURES  Procedures          MEDICATIONS GIVEN IN ER  Medications   lactated ringers infusion (125 mL/hr Intravenous New Bag 12/11/23 0620)   cefTRIAXone (ROCEPHIN) 1,000 mg in sodium chloride 0.9 % 100 mL IVPB-VTB (has no administration in time range)   HYDROmorphone (DILAUDID) injection 0.5 mg (0.5 mg Intravenous Given 12/11/23 0431)   sodium chloride 0.9 % flush 10 mL (10 mL Intravenous Given 12/11/23 0050)   sodium chloride 0.9 % flush 10 mL (has no administration in time range)   sodium chloride 0.9 % infusion 40 mL (has no administration in time range)   sennosides-docusate (PERICOLACE) 8.6-50 MG per tablet 2 tablet (has no administration in time range)     And    polyethylene glycol (MIRALAX) packet 17 g (has no administration in time range)     And   bisacodyl (DULCOLAX) EC tablet 5 mg (has no administration in time range)     And   bisacodyl (DULCOLAX) suppository 10 mg (has no administration in time range)   nitroglycerin (NITROSTAT) SL tablet 0.4 mg (has no administration in time range)   acetaminophen (TYLENOL) tablet 650 mg (has no administration in time range)     Or   acetaminophen (TYLENOL) 160 MG/5ML oral solution 650 mg (has no administration in time range)     Or   acetaminophen (TYLENOL) suppository 650 mg (has no administration in time range)   ondansetron (ZOFRAN) tablet 4 mg (has no administration in time range)     Or   ondansetron (ZOFRAN) injection 4 mg (has no administration in time range)   sodium chloride 0.9 % bolus 1,000 mL (0 mL Intravenous Stopped 12/10/23 2117)   ondansetron (ZOFRAN) injection 4 mg (4 mg Intravenous Given 12/10/23 2001)   HYDROmorphone (DILAUDID) injection 0.5 mg (0.5 mg Intravenous Given 12/10/23 2001)   iopamidol (ISOVUE-300) 61 % injection 85 mL (85 mL Intravenous Given 12/10/23 2039)   HYDROmorphone (DILAUDID) injection 0.5 mg (0.5 mg Intravenous Given 12/10/23 2123)   lactated ringers bolus 1,000 mL (0 mL Intravenous Stopped 12/10/23 2336)   ketorolac (TORADOL) injection 15 mg (15 mg Intravenous Given 12/10/23 2231)   droperidol (INAPSINE) injection 2.5 mg (2.5 mg Intravenous Given 12/10/23 2231)   cefTRIAXone (ROCEPHIN) 1,000 mg in sodium chloride 0.9 % 100 mL IVPB-VTB (0 mg Intravenous Stopped 12/11/23 0009)   diphenhydrAMINE (BENADRYL) injection 50 mg (50 mg Intravenous Given 12/10/23 2259)             MEDICAL DECISION MAKING, PROGRESS, and CONSULTS    All labs have been independently reviewed by me.  All radiology studies have been reviewed by me and I have also reviewed the radiology report.   EKG's independently viewed and interpreted by me.  Discussion below represents my analysis of pertinent findings related to  patient's condition, differential diagnosis, treatment plan and final disposition.      Additional sources:  - Discussed/ obtained information from independent historians: The patient's significant other is here who states that he has not seen her have pain like this before    - External (non-ED) record review: I reviewed the patient's ER visit here last month where she was seen for a Bartholin's gland abscess      - Shared decision making: After shared decision-making discussion to myself and the patient we agree she is admitted to the hospital for further evaluation and care      Orders placed during this visit:  Orders Placed This Encounter   Procedures    Blood Culture - Blood,    Blood Culture - Blood,    Respiratory Panel PCR w/COVID-19(SARS-CoV-2) EAMON/RICARDO/MARIETTA/PAD/COR/MINNIE In-House, NP Swab in UTM/VTM, 2 HR TAT - Swab, Nasopharynx    Urine Culture - Urine,    CT Abdomen Pelvis With Contrast    XR Chest 1 View    XR Abdomen KUB    Comprehensive Metabolic Panel    Lipase    hCG, Serum, Qualitative    Urinalysis With Microscopic If Indicated (No Culture) - Urine, Clean Catch    CBC Auto Differential    Urinalysis, Microscopic Only - Urine, Clean Catch    Lactic Acid, Plasma    Procalcitonin    Lithium Level    Basic Metabolic Panel    CBC (No Diff)    Urine Drug Screen - Urine, Clean Catch    NPO Diet NPO Type: Strict NPO    Nasogastric tube insertion    Vital Signs    Intake & Output    Weigh Patient    Oral Care    Place Sequential Compression Device    Maintain Sequential Compression Device    Telemetry - Maintain IV Access    Telemetry - Place Orders & Notify Provider of Results When Patient Experiences Acute Chest Pain, Dysrhythmia or Respiratory Distress    May Be Off Telemetry for Tests    Up With Assistance    Daily Weights    Neuro Checks    Neurovascular Checks    Opioid Administration - Document EtCO2 and / or SpO2 With Each Set of Vitals & Any Change in Patient Status    Opioid Administration - Notify  Provider Hypercapnic Monitoring    Opioid Administration - Continuous Pulse Oximetry (SpO2)    Code Status and Medical Interventions:    Surgery (on-call MD unless specified)    LHA (on-call MD unless specified) Details    Incentive Spirometry    Oxygen Therapy- Nasal Cannula; Titrate 1-6 LPM Per SpO2; 90 - 95%    SCANNED - TELEMETRY      Insert Peripheral IV    Initiate Observation Status    CBC & Differential         Differential diagnosis:  My differential diagnosis includes but is not limited to gastritis, pancreatitis, cholecystitis, appendicitis, diverticulitis, urinary tract infection, kidney stone, or bowel obstruction.        Independent interpretation of labs, radiology studies, and discussions with consultants:  ED Course as of 12/11/23 0753   Sun Dec 10, 2023   1904 I will treat the patient with IV fluids and control her pain and nausea while obtaining labs, urinalysis and CT scan for further evaluation [GP]   2200 Patient's WBC is 31,000.  I will cover with IV fluids and IV antibiotics.  The patient has had continued pain despite 1 dose of Dilaudid and Zofran and thus I will repeat. [GP]   2216 My independent interpretation of the patient's abdominal CT scan is stomach that is mildly distended with fluid and fluid-filled loops of small bowel within the pelvis and right side of the abdomen.  The left side of the abdomen is gas distended.  I do not see kidney stones.  I do not see free air [GP]   2216 The radiologist believes the stomach is mildly distended and that the patient may have gastroenteritis, but the appendix and terminal ileum are normal [GP]   2217 The patient does have TNTC white blood cells in her urine and 1+ bacteria.  I will cover her with Rocephin and send off a urine culture. [GP]   2224 On repeat examination the patient is sitting up in the bed in significant pain that she is describing as epigastric in origin.  With the patient's elevated white count, abnormal CT scan and continued  pain despite 2 doses of Dilaudid I will consult surgery for further recommendations.  I will treat the patient with Inapsine. [GP]   2246 I discussed the case with Dr. Orellana from general surgery.  We have reviewed the patient's WBC of 31 and intractable abdominal pain.  We reviewed the patient's abdominal CT scan.  She has asked that we place an NG tube and the patient and admit the patient to the hospitalist and she will consult. [GP]   2243 The patient is now having what appears to be a dystonic reaction to the Inapsine.  I will give her Benadryl.  I advised her of my consultation with general surgery and the need for an NG tube and admission to the hospital for further evaluation and care.  The patient and her significant other understand and agree with the plan. [GP]   8833 I discussed the patient's case with Liliana Costello from [facility].  She is aware of the patient's presentation, elevated white count, abnormal CT scan and my consultation with surgery.  She will admit the patient to a telemetry bed under Dr. Boyd's service for further evaluation and care.  She is aware that I have ordered an NG tube. [GP]   8273 Currently the patient is doing better after the Benadryl with marked improvement of her dystonic reaction from Inapsine.  Currently she is awaiting NG tube placement by the nurse and admission to the hospital for further evaluation and care and general surgical consultation [GP]      ED Course User Index  [GP] Tarik Butts MD               DIAGNOSIS  Final diagnoses:   Intractable abdominal pain   Leukocytosis, unspecified type   Acute UTI   Abnormal abdominal CT scan         DISPOSITION  ADMISSION    Discussed treatment plan and reason for admission with pt/family and admitting physician.  Pt/family voiced understanding of the plan for admission for further testing/treatment as needed.            Latest Documented Vital Signs:  As of 07:53 EST  BP- 139/98 HR- 118 Temp- 98.2 °F (36.8 °C) (Oral) O2  sat- 100%--      --------------------  Please note that portions of this were completed with a voice recognition program.       Note Disclaimer: At [facility], we believe that sharing information builds trust and better relationships. You are receiving this note because you are receiving care at [facility] or recently visited. It is possible you will see health information before a provider has talked with you about it. This kind of information can be easy to misunderstand. To help you fully understand what it means for your health, we urge you to discuss this note with your provider.             Tarik Butts MD  23      Electronically signed by Tarik Butts MD at 23       Alea Dockery RN at 12/10/23 1850          Patient from home via EMS reporting RLQ pain that radiates in to pelvis and right flank. Pain started last night. Patient denies N/V/D. States she began having difficulty urinating about an hour ago.     Electronically signed by Alea Dockery RN at 12/10/23 1851          Physician Progress Notes (last 72 hours)        Don Quevedo MD at 23 1240              Name: Karissa Roach ADMIT: 12/10/2023   : 1990  PCP: Tigre Esquivel MD    MRN: [id number] LOS: 0 days   AGE/SEX: 33 y.o. female  ROOM: [facility]     Subjective   Subjective   No acute events. Patient is feeling better overall. She is still having some abdominal pain but no nausea or vomiting. Tolerating clears.  Her father is at bedside.    Objective   Objective   Vital Signs  Temp:  [97.3 °F (36.3 °C)-99.3 °F (37.4 °C)] 97.3 °F (36.3 °C)  Heart Rate:  [107-125] 119  Resp:  [17-18] 18  BP: (113-122)/(76-85) 113/76  SpO2:  [98 %-99 %] 99 %  on   ;   Device (Oxygen Therapy): room air  Body mass index is 26.62 kg/m².  Physical Exam  Vitals and nursing note reviewed.   Constitutional:       General: She is not in acute distress.     Appearance: She is not toxic-appearing or diaphoretic.    HENT:      Head: Normocephalic and atraumatic.      Nose: Nose normal.      Mouth/Throat:      Mouth: Mucous membranes are moist.      Pharynx: Oropharynx is clear.   Eyes:      Conjunctiva/sclera: Conjunctivae normal.      Pupils: Pupils are equal, round, and reactive to light.   Cardiovascular:      Rate and Rhythm: Normal rate and regular rhythm.      Pulses: Normal pulses.   Pulmonary:      Effort: Pulmonary effort is normal.      Breath sounds: Normal breath sounds.   Abdominal:      General: Bowel sounds are normal.      Palpations: Abdomen is soft.      Tenderness: There is abdominal tenderness (mild, diffuse, worst in RUQ). There is no guarding or rebound.   Musculoskeletal:         General: No swelling or tenderness.      Cervical back: Neck supple.   Skin:     General: Skin is warm and dry.      Capillary Refill: Capillary refill takes less than 2 seconds.   Neurological:      General: No focal deficit present.      Mental Status: She is alert and oriented to person, place, and time.   Psychiatric:         Mood and Affect: Mood normal.         Behavior: Behavior normal.       Results Review     I reviewed the patient's new clinical results.  Results from last 7 days   Lab Units 12/11/23  0257 12/10/23  1957   WBC 10*3/mm3 27.35* 31.26*   HEMOGLOBIN g/dL 12.3 13.9   PLATELETS 10*3/mm3 299 346     Results from last 7 days   Lab Units 12/11/23  0257 12/10/23  1957   SODIUM mmol/L 137 137   POTASSIUM mmol/L 4.1 3.9   CHLORIDE mmol/L 103 99   CO2 mmol/L 24.1 26.0   BUN mg/dL 6 9   CREATININE mg/dL 0.47* 0.54*   GLUCOSE mg/dL 104* 121*   EGFR mL/min/1.73 129.9 125.6     Results from last 7 days   Lab Units 12/10/23  1957   ALBUMIN g/dL 4.7   BILIRUBIN mg/dL 0.4   ALK PHOS U/L 82   AST (SGOT) U/L 21   ALT (SGPT) U/L 44*     Results from last 7 days   Lab Units 12/11/23  0257 12/10/23  1957   CALCIUM mg/dL 8.7 9.3   ALBUMIN g/dL  --  4.7     Results from last 7 days   Lab Units 12/10/23  2256 12/10/23  1957  "  PROCALCITONIN ng/mL  --  0.38*   LACTATE mmol/L 1.4  --      No results found for: \"HGBA1C\", \"POCGLU\"    US Gallbladder    Result Date: 12/11/2023  Normal right upper quadrant ultrasound.  This report was finalized on 12/11/2023 9:10 PM by Dr. Nadine Trevino M.D on Workstation: [id number]      XR Chest 1 View    Result Date: 12/11/2023  1. No acute process. 2. Gastric tube courses into the stomach. Its tip is probably in the lateral aspect of the proximal to mid stomach.   This report was finalized on 12/11/2023 8:45 AM by Dr. Angel Spain M.D on Workstation: [id number]      CT Abdomen Pelvis With Contrast    Result Date: 12/10/2023   1. Stomach appears mildly distended, and there are some patulous loops of small bowel within the pelvis. Nonspecific gastroenteritis is not excluded. The appendix is normal, as is the terminal ileum.  Radiation dose reduction techniques were utilized, including automated exposure control and exposure modulation based on body size.   This report was finalized on 12/10/2023 9:42 PM by Dr. Nadine Trevino M.D on Workstation: [id number]       I have personally reviewed all medications:  Scheduled Medications  cefTRIAXone, 1,000 mg, Intravenous, Q24H  famotidine, 20 mg, Intravenous, Q12H  sodium chloride, 10 mL, Intravenous, Q12H    Infusions  lactated ringers, 125 mL/hr, Last Rate: 125 mL/hr (12/12/23 1228)    Diet  Diet: Liquid Diets; Clear Liquid; Fluid Consistency: Thin (IDDSI 0)    I have personally reviewed:  [x]  Laboratory   [x]  Microbiology   [x]  Radiology   [x]  EKG/Telemetry  []  Cardiology/Vascular   []  Pathology    [x]  Records    Assessment/Plan     Active Hospital Problems    Diagnosis  POA    **Acute gastroenteritis [K52.9]  Yes    Acute cystitis without hematuria [N30.00]  Yes    Acute distention of stomach [K31.0]  Yes    Leukocytosis [D72.829]  Yes    Abdominal pain [R10.9]  Yes    Psychoactive substance dependence [F19.20]  Yes    Posttraumatic stress " disorder [F43.10]  Yes      Resolved Hospital Problems   No resolved problems to display.   Acute Gastroenteritis  - had acute distension of the stomach on admission and NG tube was placed-no evidence of obstruction  - has RUQ pain but her GB ultrasound was normal  - NG tube removed, advance diet  - GI PCR ordered, continue symptomatic management  - appreciate general surgery recs    Acute Cystitis without Hematuria  - had some urinary hesitancy which has resolved  - urine culture from 2d ago growing small amount of normal kt  - finish short course of abx, on ceftriaxone now    Leukocytosis  - improved today but still quite elevated  - I suspect this is reactive to above issues-she does appear to have a chronically elevated WBC count of 11-18k-?due to smoking  - monitor CBC in AM, blood cx's NGTD      She can likely be discharged home tomorrow if she is feeling better and WBC is stable or decreasing.    SCDs for DVT prophylaxis.  Full code.  Discussed with patient, family, nursing staff, and care team on multidisciplinary rounds.  Anticipate discharge home tomorrow.       Don Quevedo MD  [facility]  12/12/23  12:43 EST      Electronically signed by Don Quevedo MD at 12/12/23 1250       Severiano Garcia MD at 12/12/23 0768          Colorectal & General Surgery  Progress Note    Patient: Karissa Roach  YOB: 1990  MRN: [id number]      Assessment  Karissa Roach is a 33 y.o. female with significant abdominal pain centered in her right upper quadrant.  I personally reviewed the gallbladder ultrasound, and it is completely normal.  I do not suspect the etiology of her abdominal pain is her gallbladder. Perhaps she has a terrible gastroenteritis.  She is passing flatus, and I think it would be okay to remove her nasogastric tube today and advance her diet slowly as tolerated.    I will continue to follow.  Please call with any questions or  concerns.    Subjective  No acute events overnight.  Abdominal pain slightly improved.  Passing flatus.    Objective    Vitals:    12/12/23 0433   BP:    Pulse: 109   Resp:    Temp:    SpO2: 99%       Physical Exam  Constitutional: Well-developed well-nourished, no acute distress  Neck: Supple, trachea midline  Respiratory: No increased work of breathing, Symmetric excursion  Cardiovascular: Well pefursed, no jugular venous distention evident   Abdominal: Soft, tender to palpation in the epigastrium, non-distended  Skin: Warm, dry, no rash on visualized skin surfaces  Psychiatric: Alert and oriented ×3, normal affect     Laboratory Results  Pending    Radiology  Pending         Sunny Garcia MD  Colorectal & General Surgery  [facility]    [address], [address]  [place], [place], [address]  P: [phone]  F: [phone]      Electronically signed by Severiano Garcia MD at 12/12/23 0740          Consult Notes (last 72 hours)        Severiano Garcia MD at 12/11/23 1654        Consult Orders    1. Surgery (on-call MD unless specified) [[id number]] ordered by Tarik Butts MD at 12/10/23 2224                 Colorectal & General Surgery  Consultation    Patient: Karissa Roach  YOB: 1990  MRN: [id number]      Assessment  Karissa Roach is a 33 y.o. female with right upper quadrant abdominal pain in the setting of significant leukocytosis and diffuse edema throughout her entire gastrointestinal tract on CT scan.  Her clinical symptoms are somewhat consistent with cholecystitis, but this is not supported by her CT scan.  Based on her CT scan, I favor severe gastroenteritis.  Overall, unclear picture.  I will order an ultrasound of the gallbladder as well as stool studies.  She can have ice chips from my standpoint, but would keep nasogastric tube for now since that is at least helped her symptoms.  I will continue to follow.  Please call with any questions or  concerns.    History of Present Illness   Karissa Roach is a 33 y.o. female who I am seeing in consultation regarding abdominal pain at the request of Alvino Boyd MD.  She developed acute right upper quadrant abdominal pain yesterday.  She was previously in her usual state of health.  It was associated with nausea but no emesis.  No sick contacts.  Denies fevers, night sweats, chills, diarrhea, constipation.  She is not passing any flatus.  She has never felt like this before.    Most recent colonoscopy: Never    Past Medical History   Past Medical History:   Diagnosis Date    Anemia         Past Surgical History   Past Surgical History:   Procedure Laterality Date    ABDOMINAL SURGERY      D & C WITH SUCTION N/A 9/20/2018    Procedure: DILATATION AND CURETTAGE WITH SUCTION;  Surgeon: Jose Mobley MD;  Location: [facility];  Service: Obstetrics/Gynecology    OTHER SURGICAL HISTORY      removal of endometriosis in 2014       Social History  Social History     Socioeconomic History    Marital status: Single   Tobacco Use    Smoking status: Every Day     Packs/day: 1     Types: Cigarettes    Smokeless tobacco: Never   Vaping Use    Vaping Use: Never used   Substance and Sexual Activity    Alcohol use: Never     Comment: sober    Drug use: No    Sexual activity: Defer       Family History  Family History   Problem Relation Age of Onset    Malig Hyperthermia Neg Hx        Colorectal cancer family history: None    Review of Systems  Negative except as documented in the HPI.     Allergies  Allergies   Allergen Reactions    Inapsine [Droperidol] Dystonia       Medications    Current Facility-Administered Medications:     acetaminophen (TYLENOL) tablet 650 mg, 650 mg, Oral, Q4H PRN **OR** acetaminophen (TYLENOL) 160 MG/5ML oral solution 650 mg, 650 mg, Oral, Q4H PRN **OR** acetaminophen (TYLENOL) suppository 650 mg, 650 mg, Rectal, Q4H PRN, Chante Costello APRN    sennosides-docusate (PERICOLACE) 8.6-50 MG  per tablet 2 tablet, 2 tablet, Oral, BID PRN **AND** polyethylene glycol (MIRALAX) packet 17 g, 17 g, Oral, Daily PRN **AND** bisacodyl (DULCOLAX) EC tablet 5 mg, 5 mg, Oral, Daily PRN **AND** bisacodyl (DULCOLAX) suppository 10 mg, 10 mg, Rectal, Daily PRN, Chante Costello APRN    cefTRIAXone (ROCEPHIN) 1,000 mg in sodium chloride 0.9 % 100 mL IVPB-VTB, 1,000 mg, Intravenous, Q24H, Chante Costello APRN    famotidine (PEPCID) injection 20 mg, 20 mg, Intravenous, Q12H, Alvino Boyd MD, 20 mg at 12/11/23 0841    HYDROmorphone (DILAUDID) injection 0.5 mg, 0.5 mg, Intravenous, Q4H PRN, Alvino Boyd MD, 0.5 mg at 12/11/23 1622    lactated ringers infusion, 125 mL/hr, Intravenous, Continuous, Tarik Butts MD, Last Rate: 125 mL/hr at 12/11/23 0620, 125 mL/hr at 12/11/23 0620    nitroglycerin (NITROSTAT) SL tablet 0.4 mg, 0.4 mg, Sublingual, Q5 Min PRN, Chante Costello APRN    ondansetron (ZOFRAN) tablet 4 mg, 4 mg, Oral, Q6H PRN **OR** ondansetron (ZOFRAN) injection 4 mg, 4 mg, Intravenous, Q6H PRN, Chante Costello APRN    sodium chloride 0.9 % flush 10 mL, 10 mL, Intravenous, Q12H, Chante Costello APRN, 10 mL at 12/11/23 0841    sodium chloride 0.9 % flush 10 mL, 10 mL, Intravenous, PRN, Chante Costello APRN    sodium chloride 0.9 % infusion 40 mL, 40 mL, Intravenous, PRN, Chante Costello APRN    Vital Signs  Vitals:    12/11/23 1414   BP: 122/82   Pulse: (!) 125   Resp: 18   Temp: 98.2 °F (36.8 °C)   SpO2:           Physical Exam  Constitutional: Resting comfortably, no acute distress  Neck: Supple, trachea midline  Respiratory: No increased work of breathing, Symmetric excursion  Cardiovascular: Well pefursed, no jugular venous distention evident   Abdominal:  Soft, tender to palpation in the right upper quadrant and right lower abdomen as well as the epigastrium but no involuntary guarding, mildly distended  Lymphatics: No cervical or  suprascapular adenopathy  Skin: Warm, dry, no rash on visualized skin surfaces  Musculoskeletal: Symmetric strength, no obvious gross abnormalities  Psychiatric: Alert and oriented ×3, normal affect     Laboratory Results  I have personally reviewed CBC with WBC 27, hemoglobin 12, platelets 299.  BMP with creatinine 0.47, potassium 4.1.  Lactate 1.4.  Lipase 7.  Procalcitonin 0.3.    Radiology  I have personally reviewed CT scan of the abdomen pelvis demonstrates relatively normal-appearing gallbladder.  Diffusely edematous stomach and small intestine.  No pneumoperitoneum.  No clear transition point in dilation of her intestine.    Endoscopy  None to review         Sunny Garcia MD  Colorectal & General Surgery  [facility]    [address], [address]  [place], [place], [address]  P: [phone]  F: [phone]      Electronically signed by Severiano Garcia MD at 12/11/23 0654

## 2023-12-12 NOTE — PLAN OF CARE
Goal Outcome Evaluation:  Plan of Care Reviewed With: patient        Progress: improving  Outcome Evaluation: Dilaudid given q3H. Zofran given x1. NG tube site checked, tape on the nose changed, still at 55cm rahul. Output in chart. Pt still agitated about NG tube in place, wanted to take it out, education provided about benefit of a NG tube. This RN called pt's mother Faye at around 0440 when pt is in tears and trying to call family members. Notified her about agitation and she said she will call pt. VSS, will continue to monitor.

## 2023-12-12 NOTE — PLAN OF CARE
Goal Outcome Evaluation:    Progress: improving  Outcome Evaluation: Ms. Roach admitted on 12/10 for acute gastroenteritis. C/o RUQ abd pain that comes and goes - patient believes pain is worse when gas is built up. PRN Dilaudid given. IV Rocephin continued. IVF continued. PRN Zofran tried 1x for abdominal discomfort. Awaiting stool for GI panel. NG tube out this AM - tolerating clear liquids. Patient's father at bedside this AM. Patient stable and needs met at this time.

## 2023-12-12 NOTE — DISCHARGE SUMMARY
Name: Karissa Roach ADMIT: 12/10/2023   : 1990  PCP: Tigre Esquivel MD    MRN: [id number] LOS: 0 days   AGE/SEX: 33 y.o. female  ROOM: [facility]     Subjective   Subjective   No acute events. Patient is feeling better overall. She is still having some abdominal pain but no nausea or vomiting. Tolerating clears.  Her father is at bedside.    Objective   Objective   Vital Signs  Temp:  [97.3 °F (36.3 °C)-99.3 °F (37.4 °C)] 97.3 °F (36.3 °C)  Heart Rate:  [107-125] 119  Resp:  [17-18] 18  BP: (113-122)/(76-85) 113/76  SpO2:  [98 %-99 %] 99 %  on   ;   Device (Oxygen Therapy): room air  Body mass index is 26.62 kg/m².  Physical Exam  Vitals and nursing note reviewed.   Constitutional:       General: She is not in acute distress.     Appearance: She is not toxic-appearing or diaphoretic.   HENT:      Head: Normocephalic and atraumatic.      Nose: Nose normal.      Mouth/Throat:      Mouth: Mucous membranes are moist.      Pharynx: Oropharynx is clear.   Eyes:      Conjunctiva/sclera: Conjunctivae normal.      Pupils: Pupils are equal, round, and reactive to light.   Cardiovascular:      Rate and Rhythm: Normal rate and regular rhythm.      Pulses: Normal pulses.   Pulmonary:      Effort: Pulmonary effort is normal.      Breath sounds: Normal breath sounds.   Abdominal:      General: Bowel sounds are normal.      Palpations: Abdomen is soft.      Tenderness: There is abdominal tenderness (mild, diffuse, worst in RUQ). There is no guarding or rebound.   Musculoskeletal:         General: No swelling or tenderness.      Cervical back: Neck supple.   Skin:     General: Skin is warm and dry.      Capillary Refill: Capillary refill takes less than 2 seconds.   Neurological:      General: No focal deficit present.      Mental Status: She is alert and oriented to person, place, and time.   Psychiatric:         Mood and Affect: Mood normal.         Behavior: Behavior normal.       Results Review     I reviewed the  "patient's new clinical results.  Results from last 7 days   Lab Units 12/11/23  0257 12/10/23  1957   WBC 10*3/mm3 27.35* 31.26*   HEMOGLOBIN g/dL 12.3 13.9   PLATELETS 10*3/mm3 299 346     Results from last 7 days   Lab Units 12/11/23  0257 12/10/23  1957   SODIUM mmol/L 137 137   POTASSIUM mmol/L 4.1 3.9   CHLORIDE mmol/L 103 99   CO2 mmol/L 24.1 26.0   BUN mg/dL 6 9   CREATININE mg/dL 0.47* 0.54*   GLUCOSE mg/dL 104* 121*   EGFR mL/min/1.73 129.9 125.6     Results from last 7 days   Lab Units 12/10/23  1957   ALBUMIN g/dL 4.7   BILIRUBIN mg/dL 0.4   ALK PHOS U/L 82   AST (SGOT) U/L 21   ALT (SGPT) U/L 44*     Results from last 7 days   Lab Units 12/11/23  0257 12/10/23  1957   CALCIUM mg/dL 8.7 9.3   ALBUMIN g/dL  --  4.7     Results from last 7 days   Lab Units 12/10/23  2256 12/10/23  1957   PROCALCITONIN ng/mL  --  0.38*   LACTATE mmol/L 1.4  --      No results found for: \"HGBA1C\", \"POCGLU\"    US Gallbladder    Result Date: 12/11/2023  Normal right upper quadrant ultrasound.  This report was finalized on 12/11/2023 9:10 PM by Dr. Nadine Trevino M.D on Workstation: [id number]      XR Chest 1 View    Result Date: 12/11/2023  1. No acute process. 2. Gastric tube courses into the stomach. Its tip is probably in the lateral aspect of the proximal to mid stomach.   This report was finalized on 12/11/2023 8:45 AM by Dr. Angel Spain M.D on Workstation: [id number]      CT Abdomen Pelvis With Contrast    Result Date: 12/10/2023   1. Stomach appears mildly distended, and there are some patulous loops of small bowel within the pelvis. Nonspecific gastroenteritis is not excluded. The appendix is normal, as is the terminal ileum.  Radiation dose reduction techniques were utilized, including automated exposure control and exposure modulation based on body size.   This report was finalized on 12/10/2023 9:42 PM by Dr. Nadine Trevino M.D on Workstation: [id number]       I have personally reviewed all " medications:  Scheduled Medications  cefTRIAXone, 1,000 mg, Intravenous, Q24H  famotidine, 20 mg, Intravenous, Q12H  sodium chloride, 10 mL, Intravenous, Q12H    Infusions  lactated ringers, 125 mL/hr, Last Rate: 125 mL/hr (12/12/23 1228)    Diet  Diet: Liquid Diets; Clear Liquid; Fluid Consistency: Thin (IDDSI 0)    I have personally reviewed:  [x]  Laboratory   [x]  Microbiology   [x]  Radiology   [x]  EKG/Telemetry  []  Cardiology/Vascular   []  Pathology    [x]  Records    Assessment/Plan     Active Hospital Problems    Diagnosis  POA    **Acute gastroenteritis [K52.9]  Yes    Acute cystitis without hematuria [N30.00]  Yes    Acute distention of stomach [K31.0]  Yes    Leukocytosis [D72.829]  Yes    Abdominal pain [R10.9]  Yes    Psychoactive substance dependence [F19.20]  Yes    Posttraumatic stress disorder [F43.10]  Yes      Resolved Hospital Problems   No resolved problems to display.   Acute Gastroenteritis  - had acute distension of the stomach on admission and NG tube was placed-no evidence of obstruction  - has RUQ pain but her GB ultrasound was normal  - NG tube removed, advance diet  - GI PCR ordered, continue symptomatic management  - appreciate general surgery recs    Acute Cystitis without Hematuria  - had some urinary hesitancy which has resolved  - urine culture from 2d ago growing small amount of normal kt  - finish short course of abx, on ceftriaxone now    Leukocytosis  - improved today but still quite elevated  - I suspect this is reactive to above issues-she does appear to have a chronically elevated WBC count of 11-18k-?due to smoking  - monitor CBC in AM, blood cx's NGTD      She can likely be discharged home tomorrow if she is feeling better and WBC is stable or decreasing.    SCDs for DVT prophylaxis.  Full code.  Discussed with patient, family, nursing staff, and care team on multidisciplinary rounds.  Anticipate discharge home tomorrow.       Don Quevedo MD  [place]  12/12/23  12:43 EST

## 2023-12-12 NOTE — PROGRESS NOTES
Colorectal & General Surgery  Progress Note    Patient: Karissa Roach  YOB: 1990  MRN: [id number]      Assessment  Karissa Roach is a 33 y.o. female with significant abdominal pain centered in her right upper quadrant.  I personally reviewed the gallbladder ultrasound, and it is completely normal.  I do not suspect the etiology of her abdominal pain is her gallbladder. Perhaps she has a terrible gastroenteritis.  She is passing flatus, and I think it would be okay to remove her nasogastric tube today and advance her diet slowly as tolerated.    I will continue to follow.  Please call with any questions or concerns.    Subjective  No acute events overnight.  Abdominal pain slightly improved.  Passing flatus.    Objective    Vitals:    12/12/23 0433   BP:    Pulse: 109   Resp:    Temp:    SpO2: 99%       Physical Exam  Constitutional: Well-developed well-nourished, no acute distress  Neck: Supple, trachea midline  Respiratory: No increased work of breathing, Symmetric excursion  Cardiovascular: Well pefursed, no jugular venous distention evident   Abdominal: Soft, tender to palpation in the epigastrium, non-distended  Skin: Warm, dry, no rash on visualized skin surfaces  Psychiatric: Alert and oriented ×3, normal affect     Laboratory Results  Pending    Radiology  Pending         Sunny Garcia MD  Colorectal & General Surgery  [facility]    [address], [address]  [place], [place], [address]  P: [phone]  F: [phone]

## 2023-12-13 ENCOUNTER — APPOINTMENT (OUTPATIENT)
Dept: CT IMAGING | Facility: HOSPITAL | Age: 33
End: 2023-12-13
Payer: COMMERCIAL

## 2023-12-13 ENCOUNTER — HOSPITAL ENCOUNTER (OUTPATIENT)
Facility: HOSPITAL | Age: 33
Setting detail: OBSERVATION
Discharge: HOME | End: 2023-12-13
Attending: EMERGENCY MEDICINE | Admitting: INTERNAL MEDICINE
Payer: COMMERCIAL

## 2023-12-13 VITALS
BODY MASS INDEX: 26.7 KG/M2 | SYSTOLIC BLOOD PRESSURE: 128 MMHG | TEMPERATURE: 96.7 F | RESPIRATION RATE: 16 BRPM | OXYGEN SATURATION: 100 % | DIASTOLIC BLOOD PRESSURE: 91 MMHG | HEART RATE: 103 BPM | HEIGHT: 60 IN | WEIGHT: 136 LBS

## 2023-12-13 DIAGNOSIS — R10.9 ABDOMINAL PAIN, UNSPECIFIED ABDOMINAL LOCATION: ICD-10-CM

## 2023-12-13 DIAGNOSIS — K52.9 COLITIS: Primary | ICD-10-CM

## 2023-12-13 DIAGNOSIS — A59.9 TRICHOMONIASIS: ICD-10-CM

## 2023-12-13 LAB
ALBUMIN SERPL-MCNC: 3.9 G/DL (ref 3.5–5.2)
ALBUMIN/GLOB SERPL: 1.1 G/DL
ALP SERPL-CCNC: 81 U/L (ref 39–117)
ALT SERPL W P-5'-P-CCNC: 22 U/L (ref 1–33)
AMORPH URATE CRY URNS QL MICRO: ABNORMAL /HPF
ANION GAP SERPL CALCULATED.3IONS-SCNC: 13.2 MMOL/L (ref 5–15)
AST SERPL-CCNC: 12 U/L (ref 1–32)
BACTERIA UR QL AUTO: ABNORMAL /HPF
BASOPHILS # BLD AUTO: 0.03 10*3/MM3 (ref 0–0.2)
BASOPHILS NFR BLD AUTO: 0.3 % (ref 0–1.5)
BILIRUB SERPL-MCNC: <0.2 MG/DL (ref 0–1.2)
BILIRUB UR QL STRIP: NEGATIVE
BUN SERPL-MCNC: 7 MG/DL (ref 6–20)
BUN/CREAT SERPL: 11.7 (ref 7–25)
CALCIUM SPEC-SCNC: 9.3 MG/DL (ref 8.6–10.5)
CHLORIDE SERPL-SCNC: 104 MMOL/L (ref 98–107)
CLARITY UR: ABNORMAL
CO2 SERPL-SCNC: 24.8 MMOL/L (ref 22–29)
COLOR UR: YELLOW
CREAT SERPL-MCNC: 0.6 MG/DL (ref 0.57–1)
DEPRECATED RDW RBC AUTO: 42.6 FL (ref 37–54)
EGFRCR SERPLBLD CKD-EPI 2021: 122.5 ML/MIN/1.73
EOSINOPHIL # BLD AUTO: 0.4 10*3/MM3 (ref 0–0.4)
EOSINOPHIL NFR BLD AUTO: 4 % (ref 0.3–6.2)
ERYTHROCYTE [DISTWIDTH] IN BLOOD BY AUTOMATED COUNT: 12.3 % (ref 12.3–15.4)
GLOBULIN UR ELPH-MCNC: 3.5 GM/DL
GLUCOSE SERPL-MCNC: 114 MG/DL (ref 65–99)
GLUCOSE UR STRIP-MCNC: NEGATIVE MG/DL
HCG SERPL QL: NEGATIVE
HCT VFR BLD AUTO: 41.7 % (ref 34–46.6)
HGB BLD-MCNC: 14.4 G/DL (ref 12–15.9)
HGB UR QL STRIP.AUTO: NEGATIVE
HOLD SPECIMEN: NORMAL
HYALINE CASTS UR QL AUTO: ABNORMAL /LPF
IMM GRANULOCYTES # BLD AUTO: 0.03 10*3/MM3 (ref 0–0.05)
IMM GRANULOCYTES NFR BLD AUTO: 0.3 % (ref 0–0.5)
KETONES UR QL STRIP: NEGATIVE
LEUKOCYTE ESTERASE UR QL STRIP.AUTO: ABNORMAL
LIPASE SERPL-CCNC: 16 U/L (ref 13–60)
LYMPHOCYTES # BLD AUTO: 2.36 10*3/MM3 (ref 0.7–3.1)
LYMPHOCYTES NFR BLD AUTO: 23.3 % (ref 19.6–45.3)
MCH RBC QN AUTO: 32.9 PG (ref 26.6–33)
MCHC RBC AUTO-ENTMCNC: 34.5 G/DL (ref 31.5–35.7)
MCV RBC AUTO: 95.2 FL (ref 79–97)
MONOCYTES # BLD AUTO: 0.81 10*3/MM3 (ref 0.1–0.9)
MONOCYTES NFR BLD AUTO: 8 % (ref 5–12)
NEUTROPHILS NFR BLD AUTO: 6.49 10*3/MM3 (ref 1.7–7)
NEUTROPHILS NFR BLD AUTO: 64.1 % (ref 42.7–76)
NITRITE UR QL STRIP: NEGATIVE
NRBC BLD AUTO-RTO: 0 /100 WBC (ref 0–0.2)
PH UR STRIP.AUTO: 8.5 [PH] (ref 5–8)
PLATELET # BLD AUTO: 383 10*3/MM3 (ref 140–450)
PMV BLD AUTO: 9.3 FL (ref 6–12)
POTASSIUM SERPL-SCNC: 3.5 MMOL/L (ref 3.5–5.2)
PROT SERPL-MCNC: 7.4 G/DL (ref 6–8.5)
PROT UR QL STRIP: NEGATIVE
QT INTERVAL: 319 MS
QTC INTERVAL: 451 MS
RBC # BLD AUTO: 4.38 10*6/MM3 (ref 3.77–5.28)
RBC # UR STRIP: ABNORMAL /HPF
REF LAB TEST METHOD: ABNORMAL
SODIUM SERPL-SCNC: 142 MMOL/L (ref 136–145)
SP GR UR STRIP: >=1.03 (ref 1–1.03)
SQUAMOUS #/AREA URNS HPF: ABNORMAL /HPF
TRICHOMONAS #/AREA URNS HPF: ABNORMAL /HPF
TROPONIN T SERPL HS-MCNC: <6 NG/L
UROBILINOGEN UR QL STRIP: ABNORMAL
WBC # UR STRIP: ABNORMAL /HPF
WBC NRBC COR # BLD AUTO: 10.12 10*3/MM3 (ref 3.4–10.8)
WHOLE BLOOD HOLD COAG: NORMAL
WHOLE BLOOD HOLD SPECIMEN: NORMAL
YEAST URNS QL MICRO: ABNORMAL /HPF

## 2023-12-13 PROCEDURE — G0378 HOSPITAL OBSERVATION PER HR: HCPCS

## 2023-12-13 PROCEDURE — 25810000003 SODIUM CHLORIDE 0.9 % SOLUTION: Performed by: NURSE PRACTITIONER

## 2023-12-13 PROCEDURE — 96365 THER/PROPH/DIAG IV INF INIT: CPT

## 2023-12-13 PROCEDURE — 71275 CT ANGIOGRAPHY CHEST: CPT

## 2023-12-13 PROCEDURE — 93005 ELECTROCARDIOGRAM TRACING: CPT | Performed by: NURSE PRACTITIONER

## 2023-12-13 PROCEDURE — 83690 ASSAY OF LIPASE: CPT | Performed by: NURSE PRACTITIONER

## 2023-12-13 PROCEDURE — 99285 EMERGENCY DEPT VISIT HI MDM: CPT

## 2023-12-13 PROCEDURE — 80053 COMPREHEN METABOLIC PANEL: CPT | Performed by: NURSE PRACTITIONER

## 2023-12-13 PROCEDURE — 25510000001 IOPAMIDOL PER 1 ML: Performed by: EMERGENCY MEDICINE

## 2023-12-13 PROCEDURE — 84703 CHORIONIC GONADOTROPIN ASSAY: CPT | Performed by: NURSE PRACTITIONER

## 2023-12-13 PROCEDURE — 81001 URINALYSIS AUTO W/SCOPE: CPT | Performed by: NURSE PRACTITIONER

## 2023-12-13 PROCEDURE — 96361 HYDRATE IV INFUSION ADD-ON: CPT

## 2023-12-13 PROCEDURE — 25010000002 PIPERACILLIN SOD-TAZOBACTAM PER 1 G: Performed by: NURSE PRACTITIONER

## 2023-12-13 PROCEDURE — 85025 COMPLETE CBC W/AUTO DIFF WBC: CPT | Performed by: NURSE PRACTITIONER

## 2023-12-13 PROCEDURE — 84484 ASSAY OF TROPONIN QUANT: CPT | Performed by: NURSE PRACTITIONER

## 2023-12-13 PROCEDURE — 74177 CT ABD & PELVIS W/CONTRAST: CPT

## 2023-12-13 RX ORDER — METRONIDAZOLE 500 MG/1
500 TABLET ORAL 2 TIMES DAILY
Qty: 14 TABLET | Refills: 0 | Status: SHIPPED | OUTPATIENT
Start: 2023-12-13

## 2023-12-13 RX ORDER — ONDANSETRON 4 MG/1
4 TABLET, ORALLY DISINTEGRATING ORAL EVERY 8 HOURS PRN
Qty: 30 TABLET | Refills: 0 | Status: SHIPPED | OUTPATIENT
Start: 2023-12-13

## 2023-12-13 RX ORDER — SODIUM CHLORIDE 0.9 % (FLUSH) 0.9 %
10 SYRINGE (ML) INJECTION AS NEEDED
Status: DISCONTINUED | OUTPATIENT
Start: 2023-12-13 | End: 2023-12-13 | Stop reason: HOSPADM

## 2023-12-13 RX ORDER — CIPROFLOXACIN 500 MG/1
500 TABLET, FILM COATED ORAL 2 TIMES DAILY
Qty: 14 TABLET | Refills: 0 | Status: SHIPPED | OUTPATIENT
Start: 2023-12-13

## 2023-12-13 RX ADMIN — SODIUM CHLORIDE 1000 ML: 9 INJECTION, SOLUTION INTRAVENOUS at 13:22

## 2023-12-13 RX ADMIN — IOPAMIDOL 95 ML: 755 INJECTION, SOLUTION INTRAVENOUS at 14:55

## 2023-12-13 RX ADMIN — PIPERACILLIN SODIUM AND TAZOBACTAM SODIUM 3.38 G: 3; .375 INJECTION, SOLUTION INTRAVENOUS at 16:16

## 2023-12-13 NOTE — Clinical Note
Level of Care: Med/Surg [1]   Diagnosis: Colitis [320428]   Admitting Physician: JOSEY MEDRANO [[id number]]   Attending Physician: JOSEY MEDRANO [[id number]]

## 2023-12-13 NOTE — ED PROVIDER NOTES
MD ATTESTATION NOTE  The BROWN and I have discussed this patient's history, physical exam, and treatment plan. I have reviewed the BROWN documentation and I affirm the documentation and agree with their diagnostics, findings, treatment, plan, and disposition.    I provided a substantive portion of the care of this patient and personally had a face to face interaction with the patient. I personally performed the physical exam, in its entirety.  The attached note describes my personal findings.    PCP: Provider, No Known  Patient Care Team:  Provider, No Known as PCP -      Karissa oRach is a 33 y.o. female who presents to the ED c/o abdominal pain with nausea as well as chest pain shortness of breath.  Patient reports abdominal pain began Saturday, was intermittent, then constant, back to intermittent.  Patient reports has been generalized, now localized to right upper quadrant.  Patient reports pain is sharp and stabbing.  Patient reports nausea, denies any emesis.  Patient reports last bowel movement on Sunday, does report some abdominal distention, has been passing flatus.  No urinary symptoms.  Patient reports subjective fevers, did not take her temperature.  Patient reports that she had chest pain from Sunday to yesterday, with sternal in nature.  Patient denies any chest pain at present.  Patient does endorse shortness of breath.  Patient reports that she left the hospital AMA today secondary to concern for losing her job but is now willing to be admitted to continue care.    On exam:  General: NAD.  Head: NCAT.  ENT: nares patent, no scleral icterus  Neck: Supple, trachea midline.  Cardiac: regular rate and rhythm.  Lungs: normal effort.  Abdomen: Soft, nondistended, right upper quadrant tenderness, positive Mari's, no rebound tenderness, no guarding or rigidity.   Extremities: Moves all extremities well, no peripheral edema  Neuro: alert, MAEW, follows commands  Psych: calm, cooperative  Skin: Warm,  dry.    Medical Decision Making:  After the initial H&P, I discussed pertinent information from history and physical exam with patient/family.  Discussed differential diagnosis.  Discussed plan for ED evaluation/work-up/treatment.  All questions answered.  Patient/family is agreeable with plan.    ED Course as of 12/14/23 0831   Wed Dec 13, 2023   1325 EKG independently viewed and contemporaneously interpreted by ED physician. Time: 1321.  Rate 120.  Interpretation: Sinus tachycardia, normal axis, normal QRS, no acute ST changes. [JG]   1884 Phone call with dr. brandon.  Discussed the patient, relevant history, exam, diagnostics, ED findings/progress, and concerns. They agree to admit to a MS bed and give a dose of zosyn x 1   [AH]   1608 Trichomonas, UA(!): Moderate/2+  Will treat with flagyl which will cover both colitis and trich. States she has an appt with her OB next week and will have them check for other STDs while there. Advised UA chladmydia/gonorrhea test added on to UA today [AH]   1608 Pt has called out and states she would like to go home, will send RX for colitis and place a referral to GI as an outpt [AH]      ED Course User Index  [AH] Fatou Javier APRN  [JG] George Ramachandran MD       Diagnosis  Final diagnoses:   Colitis   Abdominal pain, unspecified abdominal location   Trichomoniasis          George Ramachandran MD  12/14/23 0543

## 2023-12-13 NOTE — NURSING NOTE
Pt decided to leave AMA for work. Arm band, IV x2, heart monitor removed. Paperwork signed. LHA paged. Family at bedside, walked out with pt at 2025.

## 2023-12-13 NOTE — ED NOTES
Pt to ed from home via PV    Pt c/o abd swelling, abd pain, and SOB. Pt left AMA last night. Pt states she left because she had a work meeting. Pt was admitted for gastroenteritis. .

## 2023-12-13 NOTE — Clinical Note
[facility] EMERGENCY DEPARTMENT  [address]  [facility] [address]  Phone: [phone]    Karissa Roach was seen and treated in our emergency department on 12/13/2023.  She may return to work on 12/14/2023.         Thank you for choosing [facility].    Fatou Javier APRN

## 2023-12-13 NOTE — ED PROVIDER NOTES
" EMERGENCY DEPARTMENT ENCOUNTER    Room Number:  05/05  PCP: Tigre Esquivel MD  Historian: patient      HPI:  Chief Complaint: n/v  A complete HPI/ROS/PMH/PSH/SH/FH are unobtainable due to: nothing  Context: Karissa Roach is an afebrile ambulatory non-toxic appearing 33 y.o.  female who presents to the ED c/o abdominal pain/chest pain.     Patient states she was admitted to the hospital recently for abdominal discomfort and had an ng-tube at that time.  States she had to leave the hospital yesterday for work so she signed out AGAINST MEDICAL ADVICE.  She tells me she still having some right-sided abdominal discomfort so she came back to the hospital \"to see what was going on\".    Of note patient's urine drug screen was positive for amphetamines.  She denies any methamphetamine usage but states that she had taken a friend's Adderall to \"help get stuff done for work\" she also endorses some infrequent marijuana usage.    She does state that she is having some right-sided abdominal pain that was worse when she takes a deep breath.  No leg swelling or hemoptysis.    She denies any fevers or chills, no dysuria or urinary frequency.    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/24/2023    Opioid abuse 04/24/2023    Acute distention of stomach 12/11/2023    Acute gastroenteritis 12/12/2023    Acute cystitis without hematuria 12/12/2023     Resolved Ambulatory Problems     Diagnosis Date Noted    No Resolved Ambulatory Problems "     Past Medical History:   Diagnosis Date    Anemia          PAST SURGICAL HISTORY  Past Surgical History:   Procedure Laterality Date    ABDOMINAL SURGERY      D & C WITH SUCTION N/A 9/20/2018    Procedure: DILATATION AND CURETTAGE WITH SUCTION;  Surgeon: Jose Mobley MD;  Location: [facility];  Service: Obstetrics/Gynecology    OTHER SURGICAL HISTORY      removal of endometriosis in 2014         FAMILY HISTORY  Family History   Problem Relation Age of Onset    Malig Hyperthermia Neg Hx          SOCIAL HISTORY  Social History     Socioeconomic History    Marital status: Single   Tobacco Use    Smoking status: Every Day     Packs/day: 1     Types: Cigarettes    Smokeless tobacco: Never   Vaping Use    Vaping Use: Never used   Substance and Sexual Activity    Alcohol use: Never     Comment: sober    Drug use: No    Sexual activity: Defer         ALLERGIES  Inapsine [droperidol]        REVIEW OF SYSTEMS  Review of Systems   Gastrointestinal:  Positive for abdominal pain and nausea.            PHYSICAL EXAM  ED Triage Vitals [12/13/23 1258]   Temp Heart Rate Resp BP SpO2   96.7 °F (35.9 °C) (!) 143 18 -- 100 %      Temp src Heart Rate Source Patient Position BP Location FiO2 (%)   Tympanic Monitor -- -- --       Physical Exam      GENERAL: Alert and oriented x 4, unkempt, no acute distress  HENT: nares patent, mucous membranes are moist and intact  EYES: no scleral icterus, no injection  CV: regular rhythm, normal rate, no murmur or peripheral edema  RESPIRATORY: normal effort, clear to all fields bilaterally  ABDOMEN: soft and nontender diffusely, bowel sounds WNL  MUSCULOSKELETAL: no deformity, normal active range of motion all extremities  NEURO: alert, moves all extremities, follows commands  PSYCH:  calm, cooperative  SKIN: warm, dry and intact    Vital signs and nursing notes reviewed.          LAB RESULTS  Recent Results (from the past 24 hour(s))   Comprehensive Metabolic Panel    Collection Time:  12/13/23  1:20 PM    Specimen: Blood   Result Value Ref Range    Glucose 114 (H) 65 - 99 mg/dL    BUN 7 6 - 20 mg/dL    Creatinine 0.60 0.57 - 1.00 mg/dL    Sodium 142 136 - 145 mmol/L    Potassium 3.5 3.5 - 5.2 mmol/L    Chloride 104 98 - 107 mmol/L    CO2 24.8 22.0 - 29.0 mmol/L    Calcium 9.3 8.6 - 10.5 mg/dL    Total Protein 7.4 6.0 - 8.5 g/dL    Albumin 3.9 3.5 - 5.2 g/dL    ALT (SGPT) 22 1 - 33 U/L    AST (SGOT) 12 1 - 32 U/L    Alkaline Phosphatase 81 39 - 117 U/L    Total Bilirubin <0.2 0.0 - 1.2 mg/dL    Globulin 3.5 gm/dL    A/G Ratio 1.1 g/dL    BUN/Creatinine Ratio 11.7 7.0 - 25.0    Anion Gap 13.2 5.0 - 15.0 mmol/L    eGFR 122.5 >60.0 mL/min/1.73   Lipase    Collection Time: 12/13/23  1:20 PM    Specimen: Blood   Result Value Ref Range    Lipase 16 13 - 60 U/L   hCG, Serum, Qualitative    Collection Time: 12/13/23  1:20 PM    Specimen: Blood   Result Value Ref Range    HCG Qualitative Negative Negative   Green Top (Gel)    Collection Time: 12/13/23  1:20 PM   Result Value Ref Range    Extra Tube Hold for add-ons.    Lavender Top    Collection Time: 12/13/23  1:20 PM   Result Value Ref Range    Extra Tube hold for add-on    Light Blue Top    Collection Time: 12/13/23  1:20 PM   Result Value Ref Range    Extra Tube Hold for add-ons.    CBC Auto Differential    Collection Time: 12/13/23  1:20 PM    Specimen: Blood   Result Value Ref Range    WBC 10.12 3.40 - 10.80 10*3/mm3    RBC 4.38 3.77 - 5.28 10*6/mm3    Hemoglobin 14.4 12.0 - 15.9 g/dL    Hematocrit 41.7 34.0 - 46.6 %    MCV 95.2 79.0 - 97.0 fL    MCH 32.9 26.6 - 33.0 pg    MCHC 34.5 31.5 - 35.7 g/dL    RDW 12.3 12.3 - 15.4 %    RDW-SD 42.6 37.0 - 54.0 fl    MPV 9.3 6.0 - 12.0 fL    Platelets 383 140 - 450 10*3/mm3    Neutrophil % 64.1 42.7 - 76.0 %    Lymphocyte % 23.3 19.6 - 45.3 %    Monocyte % 8.0 5.0 - 12.0 %    Eosinophil % 4.0 0.3 - 6.2 %    Basophil % 0.3 0.0 - 1.5 %    Immature Grans % 0.3 0.0 - 0.5 %    Neutrophils, Absolute 6.49 1.70 - 7.00  10*3/mm3    Lymphocytes, Absolute 2.36 0.70 - 3.10 10*3/mm3    Monocytes, Absolute 0.81 0.10 - 0.90 10*3/mm3    Eosinophils, Absolute 0.40 0.00 - 0.40 10*3/mm3    Basophils, Absolute 0.03 0.00 - 0.20 10*3/mm3    Immature Grans, Absolute 0.03 0.00 - 0.05 10*3/mm3    nRBC 0.0 0.0 - 0.2 /100 WBC   Single High Sensitivity Troponin T    Collection Time: 12/13/23  1:20 PM    Specimen: Blood   Result Value Ref Range    HS Troponin T <6 <14 ng/L   ECG 12 Lead Chest Pain    Collection Time: 12/13/23  1:21 PM   Result Value Ref Range    QT Interval 319 ms    QTC Interval 451 ms       Ordered the above labs and reviewed the results.        RADIOLOGY  CT Abdomen Pelvis With Contrast    Result Date: 12/13/2023  EXAMINATION: CT OF THE ABDOMEN AND PELVIS WITH CONTRAST  TECHNIQUE: Computed tomography of the abdomen and pelvis after the uneventful administration of nonionic intravenous contrast per protocol. Radiation dose reduction techniques were utilized, including automated exposure control and exposure modulation based on body size.  HISTORY: Abdominal pain  COMPARISON: 12/10/2023  FINDINGS: Limited evaluation of the inferior thorax demonstrates atelectasis, without consolidation, pleural effusion, or pneumothorax. The heart is normal in size and configuration, without pericardial effusion.  There is mild wall thickening around the caudal left colon near the left colon-sigmoid junction with surrounding fat stranding, for example on series 3, image 97. A likely dominant follicle is seen in the left ovary. The urinary bladder is distended.  The liver, gallbladder, spleen, adrenal glands, kidneys, pancreas, stomach, small bowel, uterus, and abdominal vasculature are normal. No intraperitoneal fluid collection or free gas are seen. No enlarged lymph nodes are demonstrated.  Bone windows demonstrate no suspicious osseous lesions.      1. FINDINGS suggesting colitis of the caudal left colon  2. Likely dominant follicle in the left  ovary  3. Incidental findings as above.  This report was finalized on 12/13/2023 3:22 PM by Dr. John Perez M.D on Workstation: [employer]      CT Angiogram Chest    Result Date: 12/13/2023  Examination: CTA of the chest with contrast pulmonary embolus protocol  TECHNIQUE: CTA of the chest with contrast per pulmonary embolus protocol. Radiation dose reduction techniques were utilized, including automated exposure control and exposure modulation based on body size. 3D reconstructions were performed   HISTORY: Pleuritic chest pain  COMPARISON:None available  FINDINGS: No thrombus is seen in the main, segmental, or visualized subsegmental pulmonary arteries. There is no evidence of right heart strain.  There is minimal apical pleural-parenchymal scarring. Dependent atelectasis is seen, without consolidation, pleural effusion, significant size nodule or mass, or pneumothorax. The central airways are patent.  The heart is normal in size and configuration, without pericardial effusion. The mediastinal vasculature is normal in caliber. No enlarged supraclavicular, axillary, mediastinal, or hilar lymph nodes are seen. There is residual thymic tissue in the anterior mediastinum. A left breast nodule measures 1.4 x 1.2 cm on series 3, image 55.  Limited evaluation of the upper abdomen is unremarkable.  Bone windows no suspicious osseous lesions.      1. No pulmonary embolus seen  2. Left breast nodule. Consider mammography if clinically indicated.  This report was finalized on 12/13/2023 3:20 PM by Dr. John Perez M.D on Workstation: [employer]       Ordered the above noted radiological studies. Reviewed by me in PACS.            PROCEDURES  Procedures        MEDICATIONS GIVEN IN ER  Medications   sodium chloride 0.9 % flush 10 mL (has no administration in time range)                   MEDICAL DECISION MAKING, PROGRESS, and CONSULTS    All labs have been independently reviewed by me.  All radiology studies have been  reviewed by me and I have also reviewed the radiology report.   EKG's independently viewed and interpreted by me.  Discussion below represents my analysis of pertinent findings related to patient's condition, differential diagnosis, treatment plan and final disposition.      Additional sources:  - Discussed/ obtained information from independent historians: History obtained from patient    - External (non-ED) record review: Hospitalization 12/10/2023 to A Dr. Boyd and Severiano Garcia with general surgery.  His consult note advises that her symptoms are likely secondary to severe gastroenteritis.  Urine culture 12/10/2023 urine culture shows less than 25,000 normal urogenital kt    - Chronic or social conditions impacting care: None identified    - Shared decision making: Discussed test results and treatment options with patient      Orders placed during this visit:  Orders Placed This Encounter   Procedures    [facility] Draw    Comprehensive Metabolic Panel    Lipase    Urinalysis With Microscopic If Indicated (No Culture) - Urine, Clean Catch    hCG, Serum, Qualitative    CBC Auto Differential    Insert Peripheral IV    CBC & Differential    Green Top (Gel)    Lavender Top    Gold Top - SST    Light Blue Top         Additional orders considered but not ordered:  I consider checking a D-dimer but based on patient's pleuritic chest discomfort I opted for a CTA chest instead to rule out pulmonary embolism        Differential diagnosis includes but is not limited to:    Cannabinoid hyperemesis syndrome, pancreatitis, bowel obstruction      Independent interpretation of labs, radiology studies, and discussions with consultants:  ED Course as of 12/13/23 1618   Wed Dec 13, 2023   1325 EKG independently viewed and contemporaneously interpreted by ED physician. Time: 1321.  Rate 120.  Interpretation: Sinus tachycardia, normal axis, normal QRS, no acute ST changes. [JG]   6791 Phone call with dr. brandon.  Discussed the  patient, relevant history, exam, diagnostics, ED findings/progress, and concerns. They agree to admit to a MS bed and give a dose of zosyn x 1   []   1608 Trichomonas, UA(!): Moderate/2+  Will treat with flagyl which will cover both colitis and trich. States she has an appt with her OB next week and will have them check for other STDs while there. Advised UA chladmydia/gonorrhea test added on to UA today [AH]   1608 Pt has called out and states she would like to go home, will send RX for colitis and place a referral to GI as an outpt [AH]      ED Course User Index  [AH] Fatou Javier APRN  [JG] George Ramachandran MD             I have worn appropriate PPE during this patient encounter, sanitized my hands both with entering and exiting patient's room.      DIAGNOSIS  Final diagnoses:   Colitis   Abdominal pain, unspecified abdominal location   Trichomoniasis         DISPOSITION  home            Latest Documented Vital Signs:  As of 13:09 EST  BP- 128/91 HR- 103 Temp- 96.7 °F (35.9 °C) (Tympanic) O2 sat- 100%              --    Please note that portions of this were completed with a voice recognition program.       Note Disclaimer: At [facility], we believe that sharing information builds trust and better relationships. You are receiving this note because you are receiving care at [facility] or recently visited. It is possible you will see health information before a provider has talked with you about it. This kind of information can be easy to misunderstand. To help you fully understand what it means for your health, we urge you to discuss this note with your provider.             Fatou Javier APRN  12/13/23 1601       Fatou Javier APRN  12/13/23 1618       Fatou Javier APRN  12/13/23 1643

## 2023-12-15 LAB
BACTERIA SPEC AEROBE CULT: NORMAL
BACTERIA SPEC AEROBE CULT: NORMAL

## 2023-12-21 ENCOUNTER — OFFICE VISIT (OUTPATIENT)
Dept: GASTROENTEROLOGY | Facility: CLINIC | Age: 33
End: 2023-12-21
Payer: COMMERCIAL

## 2023-12-21 VITALS
BODY MASS INDEX: 27.21 KG/M2 | SYSTOLIC BLOOD PRESSURE: 126 MMHG | WEIGHT: 138.6 LBS | HEART RATE: 111 BPM | TEMPERATURE: 97.7 F | OXYGEN SATURATION: 97 % | DIASTOLIC BLOOD PRESSURE: 88 MMHG | HEIGHT: 60 IN

## 2023-12-21 DIAGNOSIS — R93.3 ABNORMAL CT SCAN, COLON: ICD-10-CM

## 2023-12-21 DIAGNOSIS — R10.9 RIGHT SIDED ABDOMINAL PAIN: Primary | ICD-10-CM

## 2023-12-21 DIAGNOSIS — K62.5 RECTAL BLEEDING: ICD-10-CM

## 2023-12-21 PROCEDURE — 99203 OFFICE O/P NEW LOW 30 MIN: CPT | Performed by: NURSE PRACTITIONER

## 2023-12-21 PROCEDURE — 1160F RVW MEDS BY RX/DR IN RCRD: CPT | Performed by: NURSE PRACTITIONER

## 2023-12-21 PROCEDURE — 1159F MED LIST DOCD IN RCRD: CPT | Performed by: NURSE PRACTITIONER

## 2023-12-21 RX ORDER — DOCUSATE SODIUM 100 MG/1
CAPSULE, LIQUID FILLED ORAL
COMMUNITY
Start: 2023-09-22

## 2023-12-21 RX ORDER — QUETIAPINE FUMARATE 25 MG/1
TABLET, FILM COATED ORAL
COMMUNITY
Start: 2023-08-24

## 2023-12-21 RX ORDER — TRAZODONE HYDROCHLORIDE 50 MG/1
TABLET ORAL
COMMUNITY

## 2023-12-21 RX ORDER — CITALOPRAM HYDROBROMIDE 10 MG/1
TABLET ORAL
COMMUNITY

## 2023-12-21 RX ORDER — ESCITALOPRAM OXALATE 10 MG/1
TABLET ORAL
COMMUNITY
Start: 2023-10-23

## 2023-12-21 RX ORDER — BUPRENORPHINE AND NALOXONE 8; 2 MG/1; MG/1
FILM, SOLUBLE BUCCAL; SUBLINGUAL
COMMUNITY
Start: 2023-11-24

## 2023-12-21 NOTE — PROGRESS NOTES
Chief Complaint   Patient presents with    Abdominal Pain       HPI    Karissa Roach is a  33 y.o. female here to establish care as a new patient for complaints of abdominal pain.    Patient will also follow with Dr. Nowak.    Past medical history of anxiety, depression, PTSD along with polysubstance abuse.    She was admitted to the hospital earlier this month when she presented with right upper quadrant abdominal pain and leukocytosis.  Suspected cholecystitis versus severe gastroenteritis.  She was seen by surgical services who ordered right upper quadrant ultrasound which was normal.  She subsequently left the hospital AMA as she was concerned about losing her job.  She then came back to the ER later that day for the same complaints.  She was found to have trichomonas and follow-up CT was suggestive of colitis of the left colon.  She was discharged on Cipro and Flagyl for both issues and referred to our services.    On visit today she reports feeling improved.  She still has residual right-sided abdominal pain but not as severe as when she went to the emergency room second time.  She is skilled nursing through antibiotic therapy.  She denies nausea, vomiting, diarrhea, or rectal pain.  She had an episode of rectal bleeding this morning with bright red blood on the toilet paper.    Reviewed labs from 8 days ago with normal white blood cell count and hemoglobin, normal LFTs, normal lipase.  GI PCR was ordered but not completed.  She had a normal respiratory panel.    Family history unknown per patient.  She has never had a colonoscopy before.    Past Medical History:   Diagnosis Date    Anemia        Past Surgical History:   Procedure Laterality Date    ABDOMINAL SURGERY      D & C WITH SUCTION N/A 9/20/2018    Procedure: DILATATION AND CURETTAGE WITH SUCTION;  Surgeon: Jose Mobley MD;  Location: [facility];  Service: Obstetrics/Gynecology    OTHER SURGICAL HISTORY      removal of endometriosis in 2014        Scheduled Meds:     Continuous Infusions: No current facility-administered medications for this visit.      PRN Meds:     Allergies   Allergen Reactions    Inapsine [Droperidol] Dystonia       Social History     Socioeconomic History    Marital status: Single   Tobacco Use    Smoking status: Every Day     Packs/day: 1     Types: Cigarettes    Smokeless tobacco: Never   Vaping Use    Vaping Use: Never used   Substance and Sexual Activity    Alcohol use: Never     Comment: sober    Drug use: No    Sexual activity: Defer       Family History   Problem Relation Age of Onset    Malig Hyperthermia Neg Hx        Review of Systems   Gastrointestinal:  Positive for abdominal pain and anal bleeding.       Vitals:    12/21/23 1424   BP: 126/88   Pulse: 111   Temp: 97.7 °F (36.5 °C)   SpO2: 97%       Physical Exam  Constitutional:       Appearance: She is well-developed.   Abdominal:      General: Bowel sounds are normal. There is no distension.      Palpations: Abdomen is soft. There is no mass.      Tenderness: There is no abdominal tenderness. There is no guarding.      Hernia: No hernia is present.   Skin:     General: Skin is warm and dry.      Capillary Refill: Capillary refill takes less than 2 seconds.   Neurological:      Mental Status: She is alert and oriented to person, place, and time.   Psychiatric:         Behavior: Behavior normal.     Assessment    Diagnoses and all orders for this visit:    1. Right sided abdominal pain (Primary)    2. Rectal bleeding    3. Abnormal CT scan, colon       Plan    Continue antibiotic therapy as previously ordered  Low residue diet for the next week  I will see her back in the office in 2 weeks at which point we will recheck her labs and reevaluate her symptoms.  Tentative colonoscopy in the near future.         LANDEN Polanco  [facility], [place], operated by [facility]  [address] [place] [address]  Office: [phone]

## 2024-02-26 ENCOUNTER — APPOINTMENT (OUTPATIENT)
Dept: ULTRASOUND IMAGING | Facility: HOSPITAL | Age: 34
End: 2024-02-26
Payer: COMMERCIAL

## 2024-02-26 ENCOUNTER — APPOINTMENT (OUTPATIENT)
Dept: CT IMAGING | Facility: HOSPITAL | Age: 34
End: 2024-02-26
Payer: COMMERCIAL

## 2024-02-26 ENCOUNTER — HOSPITAL ENCOUNTER (OUTPATIENT)
Facility: HOSPITAL | Age: 34
Discharge: HOME | End: 2024-03-01
Attending: EMERGENCY MEDICINE | Admitting: INTERNAL MEDICINE
Payer: COMMERCIAL

## 2024-02-26 DIAGNOSIS — R10.9 ACUTE RIGHT FLANK PAIN: ICD-10-CM

## 2024-02-26 DIAGNOSIS — R10.11 ABDOMINAL PAIN, ACUTE, RIGHT UPPER QUADRANT: Primary | ICD-10-CM

## 2024-02-26 DIAGNOSIS — D72.829 LEUKOCYTOSIS, UNSPECIFIED TYPE: ICD-10-CM

## 2024-02-26 DIAGNOSIS — R10.33 PERIUMBILICAL ABDOMINAL PAIN: ICD-10-CM

## 2024-02-26 PROBLEM — K59.00 CONSTIPATION: Status: ACTIVE | Noted: 2024-02-26

## 2024-02-26 LAB
ALBUMIN SERPL-MCNC: 4.4 G/DL (ref 3.5–5.2)
ALBUMIN/GLOB SERPL: 1.7 G/DL
ALP SERPL-CCNC: 73 U/L (ref 39–117)
ALT SERPL W P-5'-P-CCNC: 20 U/L (ref 1–33)
AMPHET+METHAMPHET UR QL: POSITIVE
ANION GAP SERPL CALCULATED.3IONS-SCNC: 11.9 MMOL/L (ref 5–15)
AST SERPL-CCNC: 14 U/L (ref 1–32)
B PARAPERT DNA SPEC QL NAA+PROBE: NOT DETECTED
B PERT DNA SPEC QL NAA+PROBE: NOT DETECTED
BACTERIA UR QL AUTO: ABNORMAL /HPF
BARBITURATES UR QL SCN: NEGATIVE
BASOPHILS # BLD AUTO: 0.06 10*3/MM3 (ref 0–0.2)
BASOPHILS NFR BLD AUTO: 0.3 % (ref 0–1.5)
BENZODIAZ UR QL SCN: NEGATIVE
BILIRUB SERPL-MCNC: 0.2 MG/DL (ref 0–1.2)
BILIRUB UR QL STRIP: NEGATIVE
BUN SERPL-MCNC: 5 MG/DL (ref 6–20)
BUN/CREAT SERPL: 7.2 (ref 7–25)
C PNEUM DNA NPH QL NAA+NON-PROBE: NOT DETECTED
CALCIUM SPEC-SCNC: 8.9 MG/DL (ref 8.6–10.5)
CANNABINOIDS SERPL QL: NEGATIVE
CHLORIDE SERPL-SCNC: 106 MMOL/L (ref 98–107)
CLARITY UR: CLEAR
CO2 SERPL-SCNC: 24.1 MMOL/L (ref 22–29)
COCAINE UR QL: NEGATIVE
COLOR UR: YELLOW
CREAT SERPL-MCNC: 0.69 MG/DL (ref 0.57–1)
D-LACTATE SERPL-SCNC: 0.8 MMOL/L (ref 0.5–2)
DEPRECATED RDW RBC AUTO: 41.1 FL (ref 37–54)
EGFRCR SERPLBLD CKD-EPI 2021: 118.4 ML/MIN/1.73
EOSINOPHIL # BLD AUTO: 0.09 10*3/MM3 (ref 0–0.4)
EOSINOPHIL NFR BLD AUTO: 0.4 % (ref 0.3–6.2)
ERYTHROCYTE [DISTWIDTH] IN BLOOD BY AUTOMATED COUNT: 12.2 % (ref 12.3–15.4)
FENTANYL UR-MCNC: NEGATIVE NG/ML
FLUAV SUBTYP SPEC NAA+PROBE: NOT DETECTED
FLUBV RNA ISLT QL NAA+PROBE: NOT DETECTED
GLOBULIN UR ELPH-MCNC: 2.6 GM/DL
GLUCOSE SERPL-MCNC: 90 MG/DL (ref 65–99)
GLUCOSE UR STRIP-MCNC: NEGATIVE MG/DL
HADV DNA SPEC NAA+PROBE: NOT DETECTED
HCG SERPL QL: NEGATIVE
HCOV 229E RNA SPEC QL NAA+PROBE: NOT DETECTED
HCOV HKU1 RNA SPEC QL NAA+PROBE: NOT DETECTED
HCOV NL63 RNA SPEC QL NAA+PROBE: NOT DETECTED
HCOV OC43 RNA SPEC QL NAA+PROBE: NOT DETECTED
HCT VFR BLD AUTO: 41.1 % (ref 34–46.6)
HGB BLD-MCNC: 13.9 G/DL (ref 12–15.9)
HGB UR QL STRIP.AUTO: NEGATIVE
HMPV RNA NPH QL NAA+NON-PROBE: NOT DETECTED
HOLD SPECIMEN: NORMAL
HPIV1 RNA ISLT QL NAA+PROBE: NOT DETECTED
HPIV2 RNA SPEC QL NAA+PROBE: NOT DETECTED
HPIV3 RNA NPH QL NAA+PROBE: NOT DETECTED
HPIV4 P GENE NPH QL NAA+PROBE: NOT DETECTED
HYALINE CASTS UR QL AUTO: ABNORMAL /LPF
IMM GRANULOCYTES # BLD AUTO: 0.11 10*3/MM3 (ref 0–0.05)
IMM GRANULOCYTES NFR BLD AUTO: 0.5 % (ref 0–0.5)
KETONES UR QL STRIP: NEGATIVE
LEUKOCYTE ESTERASE UR QL STRIP.AUTO: ABNORMAL
LIPASE SERPL-CCNC: 194 U/L (ref 13–60)
LYMPHOCYTES # BLD AUTO: 4.24 10*3/MM3 (ref 0.7–3.1)
LYMPHOCYTES NFR BLD AUTO: 20 % (ref 19.6–45.3)
M PNEUMO IGG SER IA-ACNC: NOT DETECTED
MCH RBC QN AUTO: 31.2 PG (ref 26.6–33)
MCHC RBC AUTO-ENTMCNC: 33.8 G/DL (ref 31.5–35.7)
MCV RBC AUTO: 92.4 FL (ref 79–97)
METHADONE UR QL SCN: NEGATIVE
MONOCYTES # BLD AUTO: 1.72 10*3/MM3 (ref 0.1–0.9)
MONOCYTES NFR BLD AUTO: 8.1 % (ref 5–12)
NEUTROPHILS NFR BLD AUTO: 15.02 10*3/MM3 (ref 1.7–7)
NEUTROPHILS NFR BLD AUTO: 70.7 % (ref 42.7–76)
NITRITE UR QL STRIP: NEGATIVE
NRBC BLD AUTO-RTO: 0 /100 WBC (ref 0–0.2)
OPIATES UR QL: NEGATIVE
OXYCODONE UR QL SCN: NEGATIVE
PH UR STRIP.AUTO: 5.5 [PH] (ref 5–8)
PLATELET # BLD AUTO: 448 10*3/MM3 (ref 140–450)
PMV BLD AUTO: 8.9 FL (ref 6–12)
POTASSIUM SERPL-SCNC: 3.8 MMOL/L (ref 3.5–5.2)
PROT SERPL-MCNC: 7 G/DL (ref 6–8.5)
PROT UR QL STRIP: NEGATIVE
RBC # BLD AUTO: 4.45 10*6/MM3 (ref 3.77–5.28)
RBC # UR STRIP: ABNORMAL /HPF
REF LAB TEST METHOD: ABNORMAL
RHINOVIRUS RNA SPEC NAA+PROBE: NOT DETECTED
RSV RNA NPH QL NAA+NON-PROBE: NOT DETECTED
SARS-COV-2 RNA NPH QL NAA+NON-PROBE: NOT DETECTED
SODIUM SERPL-SCNC: 142 MMOL/L (ref 136–145)
SP GR UR STRIP: 1.02 (ref 1–1.03)
SQUAMOUS #/AREA URNS HPF: ABNORMAL /HPF
UROBILINOGEN UR QL STRIP: ABNORMAL
WBC # UR STRIP: ABNORMAL /HPF
WBC NRBC COR # BLD AUTO: 21.24 10*3/MM3 (ref 3.4–10.8)
WHOLE BLOOD HOLD COAG: NORMAL
WHOLE BLOOD HOLD SPECIMEN: NORMAL

## 2024-02-26 PROCEDURE — G0378 HOSPITAL OBSERVATION PER HR: HCPCS

## 2024-02-26 PROCEDURE — 76705 ECHO EXAM OF ABDOMEN: CPT

## 2024-02-26 PROCEDURE — 80307 DRUG TEST PRSMV CHEM ANLYZR: CPT | Performed by: INTERNAL MEDICINE

## 2024-02-26 PROCEDURE — 80053 COMPREHEN METABOLIC PANEL: CPT

## 2024-02-26 PROCEDURE — 99285 EMERGENCY DEPT VISIT HI MDM: CPT

## 2024-02-26 PROCEDURE — 81001 URINALYSIS AUTO W/SCOPE: CPT

## 2024-02-26 PROCEDURE — 83605 ASSAY OF LACTIC ACID: CPT

## 2024-02-26 PROCEDURE — 25510000001 IOPAMIDOL 61 % SOLUTION: Performed by: EMERGENCY MEDICINE

## 2024-02-26 PROCEDURE — 25810000003 SODIUM CHLORIDE 0.9 % SOLUTION: Performed by: INTERNAL MEDICINE

## 2024-02-26 PROCEDURE — 74177 CT ABD & PELVIS W/CONTRAST: CPT

## 2024-02-26 PROCEDURE — 96376 TX/PRO/DX INJ SAME DRUG ADON: CPT

## 2024-02-26 PROCEDURE — 0202U NFCT DS 22 TRGT SARS-COV-2: CPT | Performed by: INTERNAL MEDICINE

## 2024-02-26 PROCEDURE — 85025 COMPLETE CBC W/AUTO DIFF WBC: CPT

## 2024-02-26 PROCEDURE — 83690 ASSAY OF LIPASE: CPT

## 2024-02-26 PROCEDURE — 87086 URINE CULTURE/COLONY COUNT: CPT | Performed by: INTERNAL MEDICINE

## 2024-02-26 PROCEDURE — 25010000002 KETOROLAC TROMETHAMINE PER 15 MG: Performed by: EMERGENCY MEDICINE

## 2024-02-26 PROCEDURE — 87040 BLOOD CULTURE FOR BACTERIA: CPT | Performed by: INTERNAL MEDICINE

## 2024-02-26 PROCEDURE — 25010000002 PIPERACILLIN SOD-TAZOBACTAM PER 1 G: Performed by: INTERNAL MEDICINE

## 2024-02-26 PROCEDURE — 25810000003 LACTATED RINGERS SOLUTION: Performed by: EMERGENCY MEDICINE

## 2024-02-26 PROCEDURE — 36415 COLL VENOUS BLD VENIPUNCTURE: CPT

## 2024-02-26 PROCEDURE — 96361 HYDRATE IV INFUSION ADD-ON: CPT

## 2024-02-26 PROCEDURE — 25010000002 ONDANSETRON PER 1 MG: Performed by: EMERGENCY MEDICINE

## 2024-02-26 PROCEDURE — 84703 CHORIONIC GONADOTROPIN ASSAY: CPT

## 2024-02-26 PROCEDURE — 25010000002 KETOROLAC TROMETHAMINE PER 15 MG: Performed by: INTERNAL MEDICINE

## 2024-02-26 PROCEDURE — 96375 TX/PRO/DX INJ NEW DRUG ADDON: CPT

## 2024-02-26 PROCEDURE — 96365 THER/PROPH/DIAG IV INF INIT: CPT

## 2024-02-26 RX ORDER — BISACODYL 10 MG
10 SUPPOSITORY, RECTAL RECTAL DAILY PRN
Status: DISCONTINUED | OUTPATIENT
Start: 2024-02-26 | End: 2024-02-28

## 2024-02-26 RX ORDER — SODIUM CHLORIDE 0.9 % (FLUSH) 0.9 %
10 SYRINGE (ML) INJECTION AS NEEDED
Status: DISCONTINUED | OUTPATIENT
Start: 2024-02-26 | End: 2024-03-01 | Stop reason: HOSPADM

## 2024-02-26 RX ORDER — SODIUM CHLORIDE 9 MG/ML
100 INJECTION, SOLUTION INTRAVENOUS CONTINUOUS
Status: DISCONTINUED | OUTPATIENT
Start: 2024-02-26 | End: 2024-02-28

## 2024-02-26 RX ORDER — CALCIUM CARBONATE/VITAMIN D3 600 MG-10
1 TABLET ORAL DAILY
COMMUNITY
Start: 2024-02-24

## 2024-02-26 RX ORDER — KETOROLAC TROMETHAMINE 15 MG/ML
15 INJECTION, SOLUTION INTRAMUSCULAR; INTRAVENOUS EVERY 6 HOURS PRN
Status: DISCONTINUED | OUTPATIENT
Start: 2024-02-26 | End: 2024-02-29

## 2024-02-26 RX ORDER — ONDANSETRON 2 MG/ML
4 INJECTION INTRAMUSCULAR; INTRAVENOUS EVERY 6 HOURS PRN
Status: DISCONTINUED | OUTPATIENT
Start: 2024-02-26 | End: 2024-03-01 | Stop reason: HOSPADM

## 2024-02-26 RX ORDER — LEVETIRACETAM 500 MG/1
500 TABLET ORAL DAILY
COMMUNITY
Start: 2024-02-23

## 2024-02-26 RX ORDER — ONDANSETRON 2 MG/ML
4 INJECTION INTRAMUSCULAR; INTRAVENOUS ONCE
Status: COMPLETED | OUTPATIENT
Start: 2024-02-26 | End: 2024-02-26

## 2024-02-26 RX ORDER — AMOXICILLIN 250 MG
2 CAPSULE ORAL 2 TIMES DAILY
Status: DISCONTINUED | OUTPATIENT
Start: 2024-02-26 | End: 2024-02-28

## 2024-02-26 RX ORDER — SODIUM CHLORIDE 0.9 % (FLUSH) 0.9 %
10 SYRINGE (ML) INJECTION EVERY 12 HOURS SCHEDULED
Status: DISCONTINUED | OUTPATIENT
Start: 2024-02-26 | End: 2024-03-01 | Stop reason: HOSPADM

## 2024-02-26 RX ORDER — POLYETHYLENE GLYCOL 3350 17 G/17G
17 POWDER, FOR SOLUTION ORAL DAILY
Status: DISCONTINUED | OUTPATIENT
Start: 2024-02-26 | End: 2024-02-28

## 2024-02-26 RX ORDER — KETOROLAC TROMETHAMINE 15 MG/ML
15 INJECTION, SOLUTION INTRAMUSCULAR; INTRAVENOUS ONCE
Status: COMPLETED | OUTPATIENT
Start: 2024-02-26 | End: 2024-02-26

## 2024-02-26 RX ORDER — SODIUM CHLORIDE 9 MG/ML
40 INJECTION, SOLUTION INTRAVENOUS AS NEEDED
Status: DISCONTINUED | OUTPATIENT
Start: 2024-02-26 | End: 2024-03-01 | Stop reason: HOSPADM

## 2024-02-26 RX ORDER — BISACODYL 5 MG/1
5 TABLET, DELAYED RELEASE ORAL DAILY PRN
Status: DISCONTINUED | OUTPATIENT
Start: 2024-02-26 | End: 2024-02-28

## 2024-02-26 RX ADMIN — KETOROLAC TROMETHAMINE 15 MG: 15 INJECTION, SOLUTION INTRAMUSCULAR; INTRAVENOUS at 16:10

## 2024-02-26 RX ADMIN — PIPERACILLIN SODIUM AND TAZOBACTAM SODIUM 3.38 G: 3; .375 INJECTION, SOLUTION INTRAVENOUS at 20:59

## 2024-02-26 RX ADMIN — IOPAMIDOL 85 ML: 612 INJECTION, SOLUTION INTRAVENOUS at 16:28

## 2024-02-26 RX ADMIN — ONDANSETRON 4 MG: 2 INJECTION INTRAMUSCULAR; INTRAVENOUS at 16:10

## 2024-02-26 RX ADMIN — Medication 10 ML: at 21:00

## 2024-02-26 RX ADMIN — SODIUM CHLORIDE 125 ML/HR: 9 INJECTION, SOLUTION INTRAVENOUS at 19:57

## 2024-02-26 RX ADMIN — DOCUSATE SODIUM 50MG AND SENNOSIDES 8.6MG 2 TABLET: 8.6; 5 TABLET, FILM COATED ORAL at 21:02

## 2024-02-26 RX ADMIN — KETOROLAC TROMETHAMINE 15 MG: 15 INJECTION, SOLUTION INTRAMUSCULAR; INTRAVENOUS at 19:13

## 2024-02-26 RX ADMIN — SODIUM CHLORIDE, POTASSIUM CHLORIDE, SODIUM LACTATE AND CALCIUM CHLORIDE 1000 ML: 600; 310; 30; 20 INJECTION, SOLUTION INTRAVENOUS at 16:30

## 2024-02-26 RX ADMIN — POLYETHYLENE GLYCOL 3350 17 G: 17 POWDER, FOR SOLUTION ORAL at 21:02

## 2024-02-26 NOTE — ED NOTES
"Nursing report ED to floor  Karissa Roach  33 y.o.  female    HPI (triage note):   Chief Complaint   Patient presents with    Flank Pain       Admitting doctor:   Tigre Ch MD    Admitting diagnosis:   There were no encounter diagnoses.    Code status:   Current Code Status       Date Active Code Status Order ID Comments User Context       Prior            Allergies:   Inapsine [droperidol]    Past Medical History:  Past Medical History:   Diagnosis Date    Anemia         Weight:       02/26/24  1434   Weight: 59 kg (130 lb)       Most recent vitals:   Vitals:    02/26/24 1434 02/26/24 1437 02/26/24 1701 02/26/24 1801   BP:  114/78 118/64 124/75   Pulse:   99 112   Resp:       Temp:       TempSrc:       SpO2:   98% 99%   Weight: 59 kg (130 lb)      Height: 152.4 cm (60\")          Active LDAs/IV Access:   Lines, Drains & Airways       Active LDAs       Name Placement date Placement time Site Days    Peripheral IV 02/26/24 1610 Right Antecubital 02/26/24  1610  Antecubital  less than 1                        Labs (abnormal labs have a star):   Labs Reviewed   COMPREHENSIVE METABOLIC PANEL - Abnormal; Notable for the following components:       Result Value    BUN 5 (*)     All other components within normal limits    Narrative:     GFR Normal >60  Chronic Kidney Disease <60  Kidney Failure <15     LIPASE - Abnormal; Notable for the following components:    Lipase 194 (*)     All other components within normal limits   URINALYSIS W/ MICROSCOPIC IF INDICATED (NO CULTURE) - Abnormal; Notable for the following components:    Leuk Esterase, UA Small (1+) (*)     All other components within normal limits   CBC WITH AUTO DIFFERENTIAL - Abnormal; Notable for the following components:    WBC 21.24 (*)     RDW 12.2 (*)     Neutrophils, Absolute 15.02 (*)     Lymphocytes, Absolute 4.24 (*)     Monocytes, Absolute 1.72 (*)     Immature Grans, Absolute 0.11 (*)     All other components within normal limits "   URINALYSIS, MICROSCOPIC ONLY - Abnormal; Notable for the following components:    WBC, UA 11-20 (*)     Bacteria, UA 1+ (*)     Squamous Epithelial Cells, UA 7-12 (*)     All other components within normal limits   HCG, SERUM, QUALITATIVE - Normal   LACTIC ACID, PLASMA - Normal   RAINBOW DRAW    Narrative:     The following orders were created for panel order [place] Draw.  Procedure                               Abnormality         Status                     ---------                               -----------         ------                     Green Top (Gel)[[id number]]                                  Final result               Lavender Top[[id number]]                                     Final result               Gold Top - SST[[id number]]                                                              Light Blue Top[[id number]]                                   Final result                 Please view results for these tests on the individual orders.   CBC AND DIFFERENTIAL    Narrative:     The following orders were created for panel order CBC & Differential.  Procedure                               Abnormality         Status                     ---------                               -----------         ------                     CBC Auto Differential[[id number]]        Abnormal            Final result                 Please view results for these tests on the individual orders.   GREEN TOP   LAVENDER TOP   LIGHT BLUE TOP       EKG:   No orders to display       Meds given in ED:   Medications   sodium chloride 0.9 % flush 10 mL (has no administration in time range)   lactated ringers bolus 1,000 mL (1,000 mL Intravenous New Bag 2/26/24 1630)   ondansetron (ZOFRAN) injection 4 mg (4 mg Intravenous Given 2/26/24 1610)   ketorolac (TORADOL) injection 15 mg (15 mg Intravenous Given 2/26/24 1610)   iopamidol (ISOVUE-300) 61 % injection 85 mL (85 mL Intravenous Given 2/26/24 1628)       Imaging results:  CT Abdomen Pelvis  With Contrast    Result Date: 2/26/2024  1. Subtle layering material in the gallbladder may reflect layering sludge or noncalcified gallstones. No gallbladder wall thickening, inflammation or pericholecystic fluid 2. There are some fluid-filled nondilated loops of small bowel which are nonspecific but can be seen with enteritis 3. Additional findings as described  Radiation dose reduction techniques were utilized, including automated exposure control and exposure modulation based on body size.   This report was finalized on 2/26/2024 5:03 PM by Dr. Bipin Roberts M.D on Workstation: [id number]       Ambulatory status:   - Ad kwasi    Social issues:   Social History     Socioeconomic History    Marital status: Single   Tobacco Use    Smoking status: Every Day     Packs/day: 1     Types: Cigarettes    Smokeless tobacco: Never   Vaping Use    Vaping Use: Never used   Substance and Sexual Activity    Alcohol use: Never     Comment: sober    Drug use: No    Sexual activity: Defer          NIH Stroke Scale:         Jose L Hodge RN  02/26/24 18:29 EST    Nurse Direct line for any questions: [phone]

## 2024-02-26 NOTE — ED PROVIDER NOTES
EMERGENCY DEPARTMENT ENCOUNTER    Room Number:  P679/1  PCP: Tigre Esquivel MD  Historian: Patient      HPI:  Chief Complaint: Right flank pain, right-sided abdominal pain  A complete HPI/ROS/PMH/PSH/SH/FH are unobtainable due to: Nothing  Context: Karissa Roach is a 33 y.o. female with a medical history of anemia who presents to the ED c/o acute pain in her right flank and right abdomen since yesterday.  Pain is constant, sharp, and severe.  Pain is worse when she moves or lies flat.  She reports mild nausea.  Denies fever, chills, chest pain, shortness of breath, vomiting, diarrhea, constipation, hematuria, or dysuria.  Patient was admitted here 2 months ago for similar symptoms.  She was seen by surgery.  Gallbladder ultrasound was normal.  Symptoms were ultimately felt to be due to gastroenteritis.  She has had a D&C as well as exploratory laparoscopy.  LMP was about 3 weeks ago.  Patient is currently in recovery for substance abuse so does not want to be given any opioid medications.              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/24/2023    Opioid abuse 04/24/2023    Acute distention of stomach 12/11/2023    Acute gastroenteritis 12/12/2023    Acute cystitis without hematuria 12/12/2023    Colitis 12/13/2023     Resolved Ambulatory Problems     Diagnosis Date Noted    No Resolved Ambulatory Problems     Past Medical History:   Diagnosis Date    Anemia          PAST SURGICAL HISTORY  Past Surgical History:   Procedure Laterality Date    ABDOMINAL SURGERY      D & C WITH SUCTION N/A 9/20/2018     Procedure: DILATATION AND CURETTAGE WITH SUCTION;  Surgeon: Jose Mobley MD;  Location: [facility] OR;  Service: Obstetrics/Gynecology    OTHER SURGICAL HISTORY      removal of endometriosis in 2014         FAMILY HISTORY  Family History   Problem Relation Age of Onset    Malig Hyperthermia Neg Hx          SOCIAL HISTORY  Social History     Socioeconomic History    Marital status: Single   Tobacco Use    Smoking status: Every Day     Packs/day: 1     Types: Cigarettes    Smokeless tobacco: Never   Vaping Use    Vaping Use: Never used   Substance and Sexual Activity    Alcohol use: Never     Comment: sober    Drug use: No    Sexual activity: Defer         ALLERGIES  Inapsine [droperidol]    REVIEW OF SYSTEMS  Review of Systems  Included in HPI  All systems reviewed and negative except for those discussed in HPI.      PHYSICAL EXAM  ED Triage Vitals   Temp Heart Rate Resp BP SpO2   02/26/24 1431 02/26/24 1431 02/26/24 1431 02/26/24 1437 02/26/24 1431   98 °F (36.7 °C) (!) 136 16 114/78 100 %      Temp src Heart Rate Source Patient Position BP Location FiO2 (%)   02/26/24 1431 02/26/24 1431 -- -- --   Tympanic Monitor          Physical Exam      GENERAL: Awake, alert, oriented x 3.  Well-developed, well-nourished and nontoxic-appearing female.  She appears uncomfortable.  No acute distress  HENT: NCAT, nares patent  EYES: no scleral icterus  CV: regular rhythm, tachycardic  RESPIRATORY: normal effort, clear to auscultation bilaterally  ABDOMEN: soft, there is epigastric, right upper quadrant, and right lower quadrant tenderness without rebound or guarding, there is right CVA tenderness  MUSCULOSKELETAL: Extremities are nontender with full range of motion  NEURO: Speech is normal.  No facial droop.  Follows commands  PSYCH:  calm, cooperative  SKIN: warm, dry    Vital signs and nursing notes reviewed.          LAB RESULTS  Recent Results (from the past 24 hour(s))   Comprehensive Metabolic Panel    Collection Time:  02/26/24  2:37 PM    Specimen: Blood   Result Value Ref Range    Glucose 90 65 - 99 mg/dL    BUN 5 (L) 6 - 20 mg/dL    Creatinine 0.69 0.57 - 1.00 mg/dL    Sodium 142 136 - 145 mmol/L    Potassium 3.8 3.5 - 5.2 mmol/L    Chloride 106 98 - 107 mmol/L    CO2 24.1 22.0 - 29.0 mmol/L    Calcium 8.9 8.6 - 10.5 mg/dL    Total Protein 7.0 6.0 - 8.5 g/dL    Albumin 4.4 3.5 - 5.2 g/dL    ALT (SGPT) 20 1 - 33 U/L    AST (SGOT) 14 1 - 32 U/L    Alkaline Phosphatase 73 39 - 117 U/L    Total Bilirubin 0.2 0.0 - 1.2 mg/dL    Globulin 2.6 gm/dL    A/G Ratio 1.7 g/dL    BUN/Creatinine Ratio 7.2 7.0 - 25.0    Anion Gap 11.9 5.0 - 15.0 mmol/L    eGFR 118.4 >60.0 mL/min/1.73   Lipase    Collection Time: 02/26/24  2:37 PM    Specimen: Blood   Result Value Ref Range    Lipase 194 (H) 13 - 60 U/L   hCG, Serum, Qualitative    Collection Time: 02/26/24  2:37 PM    Specimen: Blood   Result Value Ref Range    HCG Qualitative Negative Negative   Lactic Acid, Plasma    Collection Time: 02/26/24  2:37 PM    Specimen: Blood   Result Value Ref Range    Lactate 0.8 0.5 - 2.0 mmol/L   Green Top (Gel)    Collection Time: 02/26/24  2:37 PM   Result Value Ref Range    Extra Tube Hold for add-ons.    Lavender Top    Collection Time: 02/26/24  2:37 PM   Result Value Ref Range    Extra Tube hold for add-on    Light Blue Top    Collection Time: 02/26/24  2:37 PM   Result Value Ref Range    Extra Tube Hold for add-ons.    CBC Auto Differential    Collection Time: 02/26/24  2:37 PM    Specimen: Blood   Result Value Ref Range    WBC 21.24 (H) 3.40 - 10.80 10*3/mm3    RBC 4.45 3.77 - 5.28 10*6/mm3    Hemoglobin 13.9 12.0 - 15.9 g/dL    Hematocrit 41.1 34.0 - 46.6 %    MCV 92.4 79.0 - 97.0 fL    MCH 31.2 26.6 - 33.0 pg    MCHC 33.8 31.5 - 35.7 g/dL    RDW 12.2 (L) 12.3 - 15.4 %    RDW-SD 41.1 37.0 - 54.0 fl    MPV 8.9 6.0 - 12.0 fL    Platelets 448 140 - 450 10*3/mm3    Neutrophil % 70.7 42.7 - 76.0 %    Lymphocyte % 20.0 19.6 - 45.3 %    Monocyte % 8.1 5.0 -  12.0 %    Eosinophil % 0.4 0.3 - 6.2 %    Basophil % 0.3 0.0 - 1.5 %    Immature Grans % 0.5 0.0 - 0.5 %    Neutrophils, Absolute 15.02 (H) 1.70 - 7.00 10*3/mm3    Lymphocytes, Absolute 4.24 (H) 0.70 - 3.10 10*3/mm3    Monocytes, Absolute 1.72 (H) 0.10 - 0.90 10*3/mm3    Eosinophils, Absolute 0.09 0.00 - 0.40 10*3/mm3    Basophils, Absolute 0.06 0.00 - 0.20 10*3/mm3    Immature Grans, Absolute 0.11 (H) 0.00 - 0.05 10*3/mm3    nRBC 0.0 0.0 - 0.2 /100 WBC   Urinalysis With Microscopic If Indicated (No Culture) - Urine, Clean Catch    Collection Time: 02/26/24  4:06 PM    Specimen: Urine, Clean Catch   Result Value Ref Range    Color, UA Yellow Yellow, Straw    Appearance, UA Clear Clear    pH, UA 5.5 5.0 - 8.0    Specific Gravity, UA 1.017 1.005 - 1.030    Glucose, UA Negative Negative    Ketones, UA Negative Negative    Bilirubin, UA Negative Negative    Blood, UA Negative Negative    Protein, UA Negative Negative    Leuk Esterase, UA Small (1+) (A) Negative    Nitrite, UA Negative Negative    Urobilinogen, UA 0.2 E.U./dL 0.2 - 1.0 E.U./dL   Urinalysis, Microscopic Only - Urine, Clean Catch    Collection Time: 02/26/24  4:06 PM    Specimen: Urine, Clean Catch   Result Value Ref Range    RBC, UA 0-2 None Seen, 0-2 /HPF    WBC, UA 11-20 (A) None Seen, 0-2 /HPF    Bacteria, UA 1+ (A) None Seen /HPF    Squamous Epithelial Cells, UA 7-12 (A) None Seen, 0-2 /HPF    Hyaline Casts, UA 0-2 None Seen /LPF    Methodology Automated Microscopy    Urine Drug Screen - Urine, Clean Catch    Collection Time: 02/26/24  4:06 PM    Specimen: Urine, Clean Catch   Result Value Ref Range    Amphet/Methamphet, Screen Positive (A) Negative    Barbiturates Screen, Urine Negative Negative    Benzodiazepine Screen, Urine Negative Negative    Cocaine Screen, Urine Negative Negative    Opiate Screen Negative Negative    THC, Screen, Urine Negative Negative    Methadone Screen, Urine Negative Negative    Oxycodone Screen, Urine Negative Negative     Fentanyl, Urine Negative Negative       Ordered the above labs and reviewed the results.        RADIOLOGY  CT Abdomen Pelvis With Contrast    Result Date: 2/26/2024  CT ABDOMEN AND PELVIS WITH IV CONTRAST  HISTORY: Right-sided abdominal pain  TECHNIQUE: Radiation dose reduction techniques were utilized, including automated exposure control and exposure modulation based on body size. Axial images were obtained through the abdomen and pelvis after the administration of IV contrast. Coronal and sagittal reformatted images obtained.  COMPARISON: 12/13/2023  FINDINGS:  ABDOMEN: There is some groundglass opacity in the dependent portions of the lung bases likely reflecting atelectasis. Liver is unremarkable. There is some subtle layering material in the gallbladder that may reflect sludge or noncalcified gallstones. No gallbladder wall thickening or inflammation. No evidence for biliary ductal dilation. The spleen is unremarkable. The kidneys and adrenal glands are unremarkable. The pancreas is unremarkable. There are some fluid-filled nondilated loops of small bowel which are nonspecific but can be seen with enteritis.  PELVIS: Large amount of stool in the colon, correlate for constipation. Appendix within normal limits. Small right ovarian cyst. Bone windows are unremarkable.      1. Subtle layering material in the gallbladder may reflect layering sludge or noncalcified gallstones. No gallbladder wall thickening, inflammation or pericholecystic fluid 2. There are some fluid-filled nondilated loops of small bowel which are nonspecific but can be seen with enteritis 3. Additional findings as described  Radiation dose reduction techniques were utilized, including automated exposure control and exposure modulation based on body size.   This report was finalized on 2/26/2024 5:03 PM by Dr. Bipin Roberts M.D on Workstation: [id number]       Ordered the above noted radiological studies. Reviewed by me in PACS.             PROCEDURES  Procedures        OUTPATIENT MEDICATION MANAGEMENT:  Current Facility-Administered Medications Ordered in Epic   Medication Dose Route Frequency Provider Last Rate Last Admin    sennosides-docusate (PERICOLACE) 8.6-50 MG per tablet 2 tablet  2 tablet Oral BID Tigre Ch MD        And    polyethylene glycol (MIRALAX) packet 17 g  17 g Oral Daily Tigre Ch MD        And    bisacodyl (DULCOLAX) EC tablet 5 mg  5 mg Oral Daily PRN Tigre Ch MD        And    bisacodyl (DULCOLAX) suppository 10 mg  10 mg Rectal Daily PRN Tigre Ch MD        ketorolac (TORADOL) injection 15 mg  15 mg Intravenous Q6H PRN Tigre Ch MD   15 mg at 02/26/24 1913    ondansetron (ZOFRAN) injection 4 mg  4 mg Intravenous Q6H PRN Tigre Ch MD        piperacillin-tazobactam (ZOSYN) 3.375 g in iso-osmotic dextrose 50 ml (premix)  3.375 g Intravenous Once Tigre Ch MD        [START ON 2/27/2024] piperacillin-tazobactam (ZOSYN) 3.375 g in iso-osmotic dextrose 50 ml (premix)  3.375 g Intravenous Q8H Tigre Ch MD        sodium chloride 0.9 % flush 10 mL  10 mL Intravenous PRN Emergency, Triage Protocol, MD        sodium chloride 0.9 % flush 10 mL  10 mL Intravenous Q12H Tigre Ch MD        sodium chloride 0.9 % flush 10 mL  10 mL Intravenous PRN Tigre Ch MD        sodium chloride 0.9 % infusion 40 mL  40 mL Intravenous PRN Tigre Ch MD        sodium chloride 0.9 % infusion  125 mL/hr Intravenous Continuous Tigre Ch MD         No current [facility]-ordered outpatient medications on file.           MEDICATIONS GIVEN IN ER  Medications   sodium chloride 0.9 % flush 10 mL (has no administration in time range)   piperacillin-tazobactam (ZOSYN) 3.375 g in iso-osmotic dextrose 50 ml (premix) (has no administration in time range)    piperacillin-tazobactam (ZOSYN) 3.375 g in iso-osmotic dextrose 50 ml (premix) (has no administration in time range)   ketorolac (TORADOL) injection 15 mg (15 mg Intravenous Given 2/26/24 1913)   sodium chloride 0.9 % flush 10 mL (has no administration in time range)   sodium chloride 0.9 % flush 10 mL (has no administration in time range)   sodium chloride 0.9 % infusion 40 mL (has no administration in time range)   sennosides-docusate (PERICOLACE) 8.6-50 MG per tablet 2 tablet (has no administration in time range)     And   polyethylene glycol (MIRALAX) packet 17 g (has no administration in time range)     And   bisacodyl (DULCOLAX) EC tablet 5 mg (has no administration in time range)     And   bisacodyl (DULCOLAX) suppository 10 mg (has no administration in time range)   ondansetron (ZOFRAN) injection 4 mg (has no administration in time range)   sodium chloride 0.9 % infusion (has no administration in time range)   lactated ringers bolus 1,000 mL (0 mL Intravenous Stopped 2/26/24 1830)   ondansetron (ZOFRAN) injection 4 mg (4 mg Intravenous Given 2/26/24 1610)   ketorolac (TORADOL) injection 15 mg (15 mg Intravenous Given 2/26/24 1610)   iopamidol (ISOVUE-300) 61 % injection 85 mL (85 mL Intravenous Given 2/26/24 1628)                   MEDICAL DECISION MAKING, PROGRESS, and CONSULTS    All labs have been independently reviewed by me.  All radiology studies have been reviewed by me and I have also reviewed the radiology report.   EKG's independently viewed and interpreted by me.  Discussion below represents my analysis of pertinent findings related to patient's condition, differential diagnosis, treatment plan and final disposition.      Additional sources:    - Discussed/ obtained information from independent historians: Father bedside    - External (non-ED) record review: Patient was admitted here in December 2023 for abdominal pain.  She was seen by general surgery and was felt to have gastroenteritis.   Gallbladder ultrasound was normal.    -Prescription drug monitoring program review:     N/A    - Chronic or social conditions impacting patient care (Social Determinants of Health): None          Orders placed during this visit:  Orders Placed This Encounter   Procedures    Blood Culture - Blood,    Blood Culture - Blood,    Respiratory Panel PCR w/COVID-19(SARS-CoV-2) EAMON/RICARDO/MARIETTA/PAD/COR/MINNIE In-House, NP Swab in UTM/VTM, 2 HR TAT - Swab, Nasopharynx    CT Abdomen Pelvis With Contrast    US Gallbladder    [place] Draw    Comprehensive Metabolic Panel    Lipase    Urinalysis With Microscopic If Indicated (No Culture) - Urine, Clean Catch    hCG, Serum, Qualitative    Lactic Acid, Plasma    CBC Auto Differential    Urinalysis, Microscopic Only - Urine, Clean Catch    Basic Metabolic Panel    Urine Drug Screen - Urine, Clean Catch    NPO Diet NPO Type: Sips with Meds    Undress & Gown    Vital Signs    Ambulate Patient    Up in Chair    Intake & Output    Weigh patient    Oral Care    Saline Lock & Maintain IV Access    Place Sequential Compression Device    Maintain Sequential Compression Device    Code Status and Medical Interventions:    [facility] (on-call MD unless specified) Details    Inpatient General Surgery Consult    Insert Peripheral IV    Insert Peripheral IV    Initiate Observation Status    CBC & Differential    Green Top (Gel)    Lavender Top    Light Blue Top    CBC & Differential         Additional orders considered but not ordered:          Differential diagnosis includes, but is not limited to:    Differential diagnosis includes but is not limited to:  - hepatobiliary pathology such as cholecystitis, cholangitis, and symptomatic cholelithiasis  - Pancreatitis  - Dyspepsia  - Small bowel obstruction  - Appendicitis  - Diverticulitis  - UTI including pyelonephritis  - Ureteral stone  - Zoster  - Colitis, including infectious and ischemic  - Atypical ACS        Independent interpretation of labs, radiology  studies, and discussions with consultants:  ED Course as of 02/26/24 1945   Mon Feb 26, 2024   1530 Lipase(!): 194 [WH]   1530 Lactate: 0.8 [WH]   1530 HCG Qualitative: Negative [WH]   1530 WBC(!): 21.24 [WH]   1531 Glucose: 90 [WH]   1531 Creatinine: 0.69 [WH]   1531 ALT (SGPT): 20 [WH]   1531 AST (SGOT): 14 [WH]   1531 Alkaline Phosphatase: 73 [WH]   1531 Total Bilirubin: 0.2 [WH]   1658 WBC, UA(!): 11-20 [WH]   1658 Bacteria, UA(!): 1+ [WH]   1658 Squamous Epithelial Cells, UA(!): 7-12 [WH]   1658 Leukocytes, UA(!): Small (1+) [WH]   1658 Nitrite, UA: Negative [WH]   1700 CT abdomen/pelvis personally interpreted by me.  My personal is: Lungs are clear.  No bowel obstruction.  No obstructive uropathy [WH]   1722 Per the radiologist, CT abdomen/pelvis shows subtle layering material in the gallbladder.  There is no gallbladder wall thickening, inflammation, or pericholecystic fluid.  There are some fluid-filled nondilated loops of small bowel which are nonspecific.  Pancreas is unremarkable.There is a large amount of stool in the colon.  Appendix is normal.  There is a small right ovarian cyst. [WH]   1732 Test results discussed with the patient and her father, who is now bedside.  Pain has improved but not resolved after IV Toradol.  On reexam, there is still right upper quadrant tenderness without rebound or guarding.  Shared decision making was discussed and admission was recommended.  Patient is agreeable with this. [WH]   1750 Case discussed with Dr. Ch, hospitalist, and he agrees to admit the patient to a [facility] bed.  Pertinent history, exam findings, test results, ED course, and diagnoses were discussed with him.  I made him aware that gallbladder ultrasound has been ordered but not yet performed.  He states he will follow-up on these results and he will consult surgery if needed. [WH]   1811 MDM: Patient presented to ED with acute onset of right flank and right-sided abdominal pain.  She was admitted  here 2 months ago for similar symptoms.  Symptoms were initially thought to be due to her gallbladder but ultimately decided to be due to gastroenteritis.  On exam today, there was right CVA and right sided abdominal tenderness.  White blood cell count and lipase were elevated.  LFTs were normal.  CT of the abdomen/pelvis shows some sludge in the gallbladder but no evidence of cholecystitis.There was also evidence of constipation but no bowel obstruction.  Patient's symptoms improved with IV fluids and IV medications.  Patient be admitted for further evaluation. [WH]      ED Course User Index  [WH] Danyel Werner MD         COMPLEXITY OF CARE  The patient requires admission.      DIAGNOSIS  Final diagnoses:   Abdominal pain, acute, right upper quadrant   Acute right flank pain   Leukocytosis, unspecified type         DISPOSITION  ADMISSION    Discussed treatment plan and reason for admission with pt/family and admitting physician.  Pt/family voiced understanding of the plan for admission for further testing/treatment as needed.               Latest Documented Vital Signs:  AS OF 19:45 EST VITALS:    BP - 111/74  HR - 111  TEMP - 98.2 °F (36.8 °C) (Oral)  O2 SATS - 99%            --    Please note that portions of this were completed with a voice recognition program.       Note Disclaimer: At [facility], we believe that sharing information builds trust and better relationships. You are receiving this note because you are receiving care at [facility] or recently visited. It is possible you will see health information before a provider has talked with you about it. This kind of information can be easy to misunderstand. To help you fully understand what it means for your health, we urge you to discuss this note with your provider.             Danyel Werner MD  02/26/24 1945

## 2024-02-26 NOTE — H&P
[facility] Admission H&P    Patient Care Team:  Tigre Esquivel MD as PCP - General (Internal Medicine)    Chief complaint: Abdominal pain    History of Present Illness    This is a 33-year-old female with history of polysubstance abuse who self reports currently being sober.  She presented to the emergency room with acute onset abdominal pain primarily in the right upper quadrant.  This started yesterday afternoon.  She had associated nausea.  She describes the pain as sharp and stabbing in nature.  She was feeling hot and flushed yesterday and overnight into this morning.  She has had several formed bowel movements over the past few days.  Denies any sick contacts or recent illnesses.  She denies any diarrhea.  No pain or burning with urination.  She states that her pain is present to a lesser extent in the lower abdomen as well.  She had a similar presentation back in December and was found to have acute gastroenteritis.  Gallbladder disease was considered at that time but felt to be less likely.  In the emergency room she had a white blood cell count of 21,000 this evening.  Abdominal CT showed layering material in the gallbladder suggestive of sludge but no wall thickening.  Bowel loops consistent with possible enteritis and she was found to have a large amount of stool in the colon as well.  Pain is somewhat better after giving a dose of Toradol but is now starting to return.  I have been asked to admit her for further management.    Past Medical History:   Diagnosis Date    Anemia      Past Surgical History:   Procedure Laterality Date    ABDOMINAL SURGERY      D & C WITH SUCTION N/A 9/20/2018    Procedure: DILATATION AND CURETTAGE WITH SUCTION;  Surgeon: Jose Mobley MD;  Location: [facility];  Service: Obstetrics/Gynecology    OTHER SURGICAL HISTORY      removal of endometriosis in 2014     Family History   Problem Relation Age of Onset    Malig Hyperthermia Neg Hx      Social History     Tobacco Use     Smoking status: Every Day     Packs/day: 1     Types: Cigarettes    Smokeless tobacco: Never   Vaping Use    Vaping Use: Never used   Substance Use Topics    Alcohol use: Never     Comment: sober    Drug use: No     (Not in a hospital admission)    Allergies:  Inapsine [droperidol]    Review of Systems   Constitutional:  Positive for fever. Negative for chills.   HENT:  Negative for congestion and sore throat.    Eyes:  Negative for visual disturbance.   Respiratory:  Negative for cough, chest tightness, shortness of breath and wheezing.    Cardiovascular:  Negative for chest pain, palpitations and leg swelling.   Gastrointestinal:  Positive for abdominal pain and nausea. Negative for abdominal distention, diarrhea and vomiting.   Endocrine: Negative for polydipsia and polyuria.   Genitourinary:  Negative for difficulty urinating, dysuria, frequency and urgency.   Musculoskeletal:  Negative for arthralgias and myalgias.   Skin:  Negative for color change and rash.   Neurological:  Negative for dizziness and light-headedness.        PHYSICAL EXAM    Vital Signs  tMax 24 hrs:  Temp (24hrs), Av °F (36.7 °C), Min:98 °F (36.7 °C), Max:98 °F (36.7 °C)    Vitals Ranges:  Temp:  [98 °F (36.7 °C)] 98 °F (36.7 °C)  Heart Rate:  [] 106  Resp:  [16] 16  BP: (114-124)/(64-78) 119/78    Physical Exam  Vitals and nursing note reviewed.   Constitutional:       General: She is not in acute distress.     Comments: Appears in mild distress due to pain   HENT:      Head: Normocephalic and atraumatic.   Eyes:      Extraocular Movements: Extraocular movements intact.      Pupils: Pupils are equal, round, and reactive to light.   Cardiovascular:      Rate and Rhythm: Regular rhythm. Tachycardia present.   Pulmonary:      Effort: Pulmonary effort is normal. No respiratory distress.      Breath sounds: Normal breath sounds.   Abdominal:      General: There is no distension.      Palpations: Abdomen is soft.      Tenderness: There  is abdominal tenderness.      Comments: She has pain primarily in the right upper quadrant but also present diffusely to a lesser degree.   Musculoskeletal:         General: No swelling or deformity.      Cervical back: Normal range of motion.   Skin:     General: Skin is warm and dry.   Neurological:      General: No focal deficit present.      Mental Status: She is alert and oriented to person, place, and time.         Results Review:  Results from last 7 days   Lab Units 02/26/24  1437   WBC 10*3/mm3 21.24*   HEMOGLOBIN g/dL 13.9   HEMATOCRIT % 41.1   PLATELETS 10*3/mm3 448     Results from last 7 days   Lab Units 02/26/24  1437   SODIUM mmol/L 142   POTASSIUM mmol/L 3.8   CHLORIDE mmol/L 106   CO2 mmol/L 24.1   BUN mg/dL 5*   CREATININE mg/dL 0.69   CALCIUM mg/dL 8.9   BILIRUBIN mg/dL 0.2   ALK PHOS U/L 73   ALT (SGPT) U/L 20   AST (SGOT) U/L 14   GLUCOSE mg/dL 90        I reviewed the patient's new clinical results.  I reviewed the patient's new imaging results and agree with the interpretation.        Active Hospital Problems    Diagnosis  POA    **Abdominal pain, acute, right upper quadrant [R10.11]  Yes    Constipation [K59.00]  Unknown    Leukocytosis [D72.829]  Yes    Opioid abuse [F11.10]  Yes      Resolved Hospital Problems   No resolved problems to display.       Assessment & Plan    The patient will be admitted.  Etiology of her symptoms not entirely clear but could include biliary disease/infection, acute gastroenteritis, constipation.  Similar presentation back in December noted and at that time was found to have acute GE.  Given her leukocytosis and the fact that she is quite tender upon palpation I will cover her with antibiotics in the form of Zosyn and ask general surgery to evaluate.  Awaiting results of right upper quadrant ultrasound as well.  Will provide supportive care with IV fluids and antiemetics.  Pain control with Toradol.  Avoid opiates given her past history.  I will check a UDS.   Urinalysis not consistent with UTI.  Keep her n.p.o.  SCDs for DVT prophylaxis.  Additional plans based on her clinical course.    I discussed the patients findings and my recommendations with patient      Tigre Ch MD  02/26/24  18:42 EST

## 2024-02-26 NOTE — ED TRIAGE NOTES
Pt is having abd pain.  She was here recently for same.  She is in recovery since 2/22 for opiate and alcohol

## 2024-02-27 ENCOUNTER — APPOINTMENT (OUTPATIENT)
Dept: GENERAL RADIOLOGY | Facility: HOSPITAL | Age: 34
End: 2024-02-27
Payer: COMMERCIAL

## 2024-02-27 PROBLEM — F15.929 METHAMPHETAMINE INTOXICATION: Status: ACTIVE | Noted: 2024-02-27

## 2024-02-27 LAB
ANION GAP SERPL CALCULATED.3IONS-SCNC: 9.5 MMOL/L (ref 5–15)
BASOPHILS # BLD AUTO: 0.08 10*3/MM3 (ref 0–0.2)
BASOPHILS NFR BLD AUTO: 0.4 % (ref 0–1.5)
BUN SERPL-MCNC: 8 MG/DL (ref 6–20)
BUN/CREAT SERPL: 17 (ref 7–25)
CALCIUM SPEC-SCNC: 8 MG/DL (ref 8.6–10.5)
CHLORIDE SERPL-SCNC: 107 MMOL/L (ref 98–107)
CO2 SERPL-SCNC: 22.5 MMOL/L (ref 22–29)
CREAT SERPL-MCNC: 0.47 MG/DL (ref 0.57–1)
DEPRECATED RDW RBC AUTO: 42.4 FL (ref 37–54)
EGFRCR SERPLBLD CKD-EPI 2021: 129.9 ML/MIN/1.73
EOSINOPHIL # BLD AUTO: 0.07 10*3/MM3 (ref 0–0.4)
EOSINOPHIL NFR BLD AUTO: 0.3 % (ref 0.3–6.2)
ERYTHROCYTE [DISTWIDTH] IN BLOOD BY AUTOMATED COUNT: 11.9 % (ref 12.3–15.4)
GLUCOSE SERPL-MCNC: 88 MG/DL (ref 65–99)
HCT VFR BLD AUTO: 35.8 % (ref 34–46.6)
HGB BLD-MCNC: 11.8 G/DL (ref 12–15.9)
IMM GRANULOCYTES # BLD AUTO: 0.11 10*3/MM3 (ref 0–0.05)
IMM GRANULOCYTES NFR BLD AUTO: 0.5 % (ref 0–0.5)
LYMPHOCYTES # BLD AUTO: 5.05 10*3/MM3 (ref 0.7–3.1)
LYMPHOCYTES NFR BLD AUTO: 24.3 % (ref 19.6–45.3)
MCH RBC QN AUTO: 31.9 PG (ref 26.6–33)
MCHC RBC AUTO-ENTMCNC: 33 G/DL (ref 31.5–35.7)
MCV RBC AUTO: 96.8 FL (ref 79–97)
MONOCYTES # BLD AUTO: 2.43 10*3/MM3 (ref 0.1–0.9)
MONOCYTES NFR BLD AUTO: 11.7 % (ref 5–12)
NEUTROPHILS NFR BLD AUTO: 13.07 10*3/MM3 (ref 1.7–7)
NEUTROPHILS NFR BLD AUTO: 62.8 % (ref 42.7–76)
NRBC BLD AUTO-RTO: 0 /100 WBC (ref 0–0.2)
PLATELET # BLD AUTO: 317 10*3/MM3 (ref 140–450)
PMV BLD AUTO: 9 FL (ref 6–12)
POTASSIUM SERPL-SCNC: 3.9 MMOL/L (ref 3.5–5.2)
RBC # BLD AUTO: 3.7 10*6/MM3 (ref 3.77–5.28)
SODIUM SERPL-SCNC: 139 MMOL/L (ref 136–145)
WBC NRBC COR # BLD AUTO: 20.81 10*3/MM3 (ref 3.4–10.8)

## 2024-02-27 PROCEDURE — 74246 X-RAY XM UPR GI TRC 2CNTRST: CPT

## 2024-02-27 PROCEDURE — 25810000003 SODIUM CHLORIDE 0.9 % SOLUTION: Performed by: INTERNAL MEDICINE

## 2024-02-27 PROCEDURE — 96376 TX/PRO/DX INJ SAME DRUG ADON: CPT

## 2024-02-27 PROCEDURE — 85025 COMPLETE CBC W/AUTO DIFF WBC: CPT | Performed by: INTERNAL MEDICINE

## 2024-02-27 PROCEDURE — 74248 X-RAY SM INT F-THRU STD: CPT

## 2024-02-27 PROCEDURE — 25010000002 PIPERACILLIN SOD-TAZOBACTAM PER 1 G: Performed by: INTERNAL MEDICINE

## 2024-02-27 PROCEDURE — 25010000002 ONDANSETRON PER 1 MG: Performed by: INTERNAL MEDICINE

## 2024-02-27 PROCEDURE — 96366 THER/PROPH/DIAG IV INF ADDON: CPT

## 2024-02-27 PROCEDURE — 96361 HYDRATE IV INFUSION ADD-ON: CPT

## 2024-02-27 PROCEDURE — G0378 HOSPITAL OBSERVATION PER HR: HCPCS

## 2024-02-27 PROCEDURE — 25010000002 KETOROLAC TROMETHAMINE PER 15 MG: Performed by: INTERNAL MEDICINE

## 2024-02-27 PROCEDURE — 25010000002 KETOROLAC TROMETHAMINE PER 15 MG: Performed by: NURSE PRACTITIONER

## 2024-02-27 PROCEDURE — 99213 OFFICE O/P EST LOW 20 MIN: CPT

## 2024-02-27 PROCEDURE — 80048 BASIC METABOLIC PNL TOTAL CA: CPT | Performed by: INTERNAL MEDICINE

## 2024-02-27 PROCEDURE — 99244 OFF/OP CNSLTJ NEW/EST MOD 40: CPT | Performed by: INTERNAL MEDICINE

## 2024-02-27 RX ORDER — ACETAMINOPHEN 500 MG
1000 TABLET ORAL 2 TIMES DAILY
Status: DISCONTINUED | OUTPATIENT
Start: 2024-02-27 | End: 2024-02-29

## 2024-02-27 RX ORDER — KETOROLAC TROMETHAMINE 15 MG/ML
15 INJECTION, SOLUTION INTRAMUSCULAR; INTRAVENOUS ONCE
Status: COMPLETED | OUTPATIENT
Start: 2024-02-27 | End: 2024-02-27

## 2024-02-27 RX ORDER — TRAZODONE HYDROCHLORIDE 50 MG/1
50 TABLET ORAL NIGHTLY
Status: DISCONTINUED | OUTPATIENT
Start: 2024-02-27 | End: 2024-03-01 | Stop reason: HOSPADM

## 2024-02-27 RX ORDER — QUETIAPINE FUMARATE 25 MG/1
25 TABLET, FILM COATED ORAL NIGHTLY
Status: DISCONTINUED | OUTPATIENT
Start: 2024-02-27 | End: 2024-03-01 | Stop reason: HOSPADM

## 2024-02-27 RX ORDER — BUPRENORPHINE AND NALOXONE 8; 2 MG/1; MG/1
1 FILM, SOLUBLE BUCCAL; SUBLINGUAL 2 TIMES DAILY
Status: DISCONTINUED | OUTPATIENT
Start: 2024-02-27 | End: 2024-03-01 | Stop reason: HOSPADM

## 2024-02-27 RX ORDER — LEVETIRACETAM 500 MG/1
500 TABLET ORAL DAILY
Status: DISCONTINUED | OUTPATIENT
Start: 2024-02-27 | End: 2024-03-01 | Stop reason: HOSPADM

## 2024-02-27 RX ORDER — BUPRENORPHINE AND NALOXONE 8; 2 MG/1; MG/1
1 FILM, SOLUBLE BUCCAL; SUBLINGUAL ONCE
Status: COMPLETED | OUTPATIENT
Start: 2024-02-27 | End: 2024-02-27

## 2024-02-27 RX ORDER — DOCUSATE SODIUM 100 MG/1
100 CAPSULE, LIQUID FILLED ORAL DAILY
Status: DISCONTINUED | OUTPATIENT
Start: 2024-02-27 | End: 2024-03-01 | Stop reason: HOSPADM

## 2024-02-27 RX ADMIN — BUPRENORPHINE HYDROCHLORIDE, NALOXONE HYDROCHLORIDE 1 FILM: 8; 2 FILM, SOLUBLE BUCCAL; SUBLINGUAL at 05:14

## 2024-02-27 RX ADMIN — ACETAMINOPHEN 1000 MG: 500 TABLET ORAL at 21:35

## 2024-02-27 RX ADMIN — DOCUSATE SODIUM 50MG AND SENNOSIDES 8.6MG 2 TABLET: 8.6; 5 TABLET, FILM COATED ORAL at 21:35

## 2024-02-27 RX ADMIN — KETOROLAC TROMETHAMINE 15 MG: 15 INJECTION, SOLUTION INTRAMUSCULAR; INTRAVENOUS at 19:43

## 2024-02-27 RX ADMIN — BARIUM SULFATE 183 ML: 960 POWDER, FOR SUSPENSION ORAL at 15:05

## 2024-02-27 RX ADMIN — SODIUM CHLORIDE 125 ML/HR: 9 INJECTION, SOLUTION INTRAVENOUS at 05:45

## 2024-02-27 RX ADMIN — KETOROLAC TROMETHAMINE 15 MG: 15 INJECTION, SOLUTION INTRAMUSCULAR; INTRAVENOUS at 11:58

## 2024-02-27 RX ADMIN — KETOROLAC TROMETHAMINE 15 MG: 15 INJECTION, SOLUTION INTRAMUSCULAR; INTRAVENOUS at 05:14

## 2024-02-27 RX ADMIN — KETOROLAC TROMETHAMINE 15 MG: 15 INJECTION, SOLUTION INTRAMUSCULAR; INTRAVENOUS at 01:04

## 2024-02-27 RX ADMIN — ACETAMINOPHEN 1000 MG: 500 TABLET ORAL at 08:06

## 2024-02-27 RX ADMIN — ONDANSETRON 4 MG: 2 INJECTION INTRAMUSCULAR; INTRAVENOUS at 19:43

## 2024-02-27 RX ADMIN — SODIUM CHLORIDE 75 ML/HR: 9 INJECTION, SOLUTION INTRAVENOUS at 17:57

## 2024-02-27 RX ADMIN — LEVETIRACETAM 500 MG: 500 TABLET, FILM COATED ORAL at 10:29

## 2024-02-27 RX ADMIN — ONDANSETRON 4 MG: 2 INJECTION INTRAMUSCULAR; INTRAVENOUS at 11:40

## 2024-02-27 RX ADMIN — QUETIAPINE FUMARATE 25 MG: 25 TABLET ORAL at 21:35

## 2024-02-27 RX ADMIN — DOCUSATE SODIUM 50MG AND SENNOSIDES 8.6MG 2 TABLET: 8.6; 5 TABLET, FILM COATED ORAL at 08:06

## 2024-02-27 RX ADMIN — PIPERACILLIN SODIUM AND TAZOBACTAM SODIUM 3.38 G: 3; .375 INJECTION, SOLUTION INTRAVENOUS at 04:42

## 2024-02-27 RX ADMIN — BUPRENORPHINE AND NALOXONE 1 FILM: 8; 2 FILM BUCCAL; SUBLINGUAL at 17:46

## 2024-02-27 RX ADMIN — PIPERACILLIN SODIUM AND TAZOBACTAM SODIUM 3.38 G: 3; .375 INJECTION, SOLUTION INTRAVENOUS at 12:37

## 2024-02-27 RX ADMIN — ANTACID/ANTIFLATULENT 1 PACKET: 380; 550; 10; 10 GRANULE, EFFERVESCENT ORAL at 15:05

## 2024-02-27 RX ADMIN — PIPERACILLIN SODIUM AND TAZOBACTAM SODIUM 3.38 G: 3; .375 INJECTION, SOLUTION INTRAVENOUS at 19:34

## 2024-02-27 RX ADMIN — TRAZODONE HYDROCHLORIDE 50 MG: 50 TABLET ORAL at 21:35

## 2024-02-27 RX ADMIN — Medication 10 ML: at 21:35

## 2024-02-27 NOTE — CONSULTS
[facility]  Initial Inpatient Consult Note    Referring Provider: Dr. Queen    Reason for Consultation: Abdominal pain, evaluate for possible colonoscopy.    Subjective     History of present illness:    33 y.o. female (known to Dr. Pepito Nowak) with a h/o endometriosis and polysubstance abuse who was admitted 2024 with right upper quadrant abdominal pain..  She had similar pain in 2023.  She is not sure what makes the abdominal pain worse sometimes eating will make it worse.  It is better when she takes a bath or shower.  She has nausea but no vomiting.  No fever or chills.  She says that her white count is up often.  She has no diarrhea.  She is constipated.  No melena or rectal bleeding.  Her weight fluctuates.  She does smoke half pack per day.  She denies alcohol use.  She used to be.  She is currently EGD with colonoscopy.  Was prescribed.  Was at the beginning of the month.  She is  3 para 1.  She has one 12-year-old child.  The patient was seen by general surgery and they did not think that she had a surgical abdomen.  She did have an ultrasound of the gallbladder on 2024 that was normal.  She had a CT of the abdomen pelvis on 2024 that showed subtle layering material in the gallbladder that may reflect layering sludge or noncalcified gallstones.  No gallbladder wall thickening, inflammation, or pericholecystic fluid.  Fluid-filled nondilated loops of small bowel which are nonspecific but can be seen with enteritis.    Past Medical History:  Past Medical History:   Diagnosis Date    Anemia      Past Surgical History:  Past Surgical History:   Procedure Laterality Date    ABDOMINAL SURGERY      D & C WITH SUCTION N/A 2018    Procedure: DILATATION AND CURETTAGE WITH SUCTION;  Surgeon: Jose Mobley MD;  Location: [facility];  Service: Obstetrics/Gynecology    OTHER SURGICAL HISTORY      removal of endometriosis in       Social History:    Social History     Tobacco Use    Smoking status: Every Day     Packs/day: 1     Types: Cigarettes    Smokeless tobacco: Never   Substance Use Topics    Alcohol use: Never     Comment: sober      Family History:  Family History   Problem Relation Age of Onset    Malig Hyperthermia Neg Hx        Home Meds:  Medications Prior to Admission   Medication Sig Dispense Refill Last Dose    buprenorphine-naloxone (SUBOXONE) 8-2 MG film film 1 film 2 (Two) Times a Day.   2/26/2024 at 0730    docusate sodium (COLACE) 100 MG capsule Take 1 capsule by mouth Daily.   2/26/2024 at 0730    levETIRAcetam (KEPPRA) 500 MG tablet Take 1 tablet by mouth Daily.   2/26/2024 at 0730    ondansetron ODT (ZOFRAN-ODT) 4 MG disintegrating tablet Place 1 tablet on the tongue Every 8 (Eight) Hours As Needed for Nausea or Vomiting. 30 tablet 0 2/26/2024 at 0730    Thiamine Mononitrate (B1) 100 MG tablet Take 1 tablet by mouth Daily.   2/26/2024 at 0730    traZODone (DESYREL) 50 MG tablet Take 1 tablet by mouth Every Night.   2/25/2024 at 2100    QUEtiapine (SEROquel) 25 MG tablet         Current Meds:   acetaminophen, 1,000 mg, Oral, BID  buprenorphine-naloxone, 1 film, Sublingual, BID  docusate sodium, 100 mg, Oral, Daily  levETIRAcetam, 500 mg, Oral, Daily  piperacillin-tazobactam, 3.375 g, Intravenous, Q8H  senna-docusate sodium, 2 tablet, Oral, BID   And  polyethylene glycol, 17 g, Oral, Daily  QUEtiapine, 25 mg, Oral, Nightly  sodium chloride, 10 mL, Intravenous, Q12H  traZODone, 50 mg, Oral, Nightly      Allergies:  Allergies   Allergen Reactions    Inapsine [Droperidol] Dystonia     Review of Systems  The following systems were reviewed and negative;  respiratory, cardiovascular, musculoskeletal, and neurological     Objective     Vital Signs  Temp:  [97 °F (36.1 °C)-98.8 °F (37.1 °C)] 97 °F (36.1 °C)  Heart Rate:  [] 76  Resp:  [16] 16  BP: ()/(54-78) 93/59  Physical Exam:  General Appearance:    Alert, cooperative, in no  acute distress   Head:    Normocephalic, without obvious abnormality, atraumatic   Eyes:            Lids and lashes normal, conjunctivae and sclerae normal, no   icterus   Throat:   No oral lesions, no thrush, oral mucosa moist   Neck:   No adenopathy, supple, trachea midline, no thyromegaly, no   carotid bruit, no JVD   Lungs:     Clear to auscultation,respirations regular, even and                   unlabored    Heart:    Regular rhythm and normal rate, normal S1 and S2, no            murmur, no gallop, no rub, no click   Chest Wall:    No abnormalities observed   Abdomen:   Decreased bowel sounds, no masses, no organomegaly, soft        mild right upper quadrant tenderness, nondistended, no guarding, no rebound                 tenderness   Rectal:     Deferred   Extremities:   no edema, no cyanosis, no redness   Skin:   No bleeding, bruising or rash   Lymph nodes:   No palpable adenopathy   Psychiatric:  Judgement and insight: normal   Orientation to person place and time: normal   Mood and affect: normal   Results Review:   I reviewed the patient's new clinical results.    Results from last 7 days   Lab Units 02/27/24  0451 02/26/24  1437   WBC 10*3/mm3 20.81* 21.24*   HEMOGLOBIN g/dL 11.8* 13.9   HEMATOCRIT % 35.8 41.1   PLATELETS 10*3/mm3 317 448     Results from last 7 days   Lab Units 02/27/24  0451 02/26/24  1437   SODIUM mmol/L 139 142   POTASSIUM mmol/L 3.9 3.8   CHLORIDE mmol/L 107 106   CO2 mmol/L 22.5 24.1   BUN mg/dL 8 5*   CREATININE mg/dL 0.47* 0.69   CALCIUM mg/dL 8.0* 8.9   BILIRUBIN mg/dL  --  0.2   ALK PHOS U/L  --  73   ALT (SGPT) U/L  --  20   AST (SGOT) U/L  --  14   GLUCOSE mg/dL 88 90         Lab Results   Lab Value Date/Time    LIPASE 194 (H) 02/26/2024 1437    LIPASE 16 12/13/2023 1320    LIPASE 7 (L) 12/10/2023 1957    LIPASE 14 05/14/2021 1210       Radiology:  US Gallbladder   Final Result      CT Abdomen Pelvis With Contrast   Final Result   1. Subtle layering material in the  gallbladder may reflect layering   sludge or noncalcified gallstones. No gallbladder wall thickening,   inflammation or pericholecystic fluid   2. There are some fluid-filled nondilated loops of small bowel which are   nonspecific but can be seen with enteritis   3. Additional findings as described       Radiation dose reduction techniques were utilized, including automated   exposure control and exposure modulation based on body size.           This report was finalized on 2/26/2024 5:03 PM by Dr. Bipin Roberts M.D on Workstation: [id number]              Assessment & Plan   Assessment:   33 y.o. female (known to Dr. Pepito Nowak) with a h/o endometriosis and polysubstance abuse who was admitted 2/26/2024 with right upper quadrant abdominal pain..  She had similar pain in 12 of 2023.  The patient was seen by the general surgery service and they did not think that she had a surgical abdomen.  She does have elevated white count of unknown cause.  Could this be from a urinary tract infection?      Plan:   Checking Kinevac HIDA scan recommended for the function of the gallbladder.  Will check an upper GI with small bowel follow-through.  I think she should eventually have a colonoscopy.    I discussed the patient's findings and my recommendations with patient and nursing staff.         Fili Rice M.D.  [facility], [place]  [address] [place] [address]  Office: [phone]

## 2024-02-27 NOTE — CONSULTS
General Surgery     Summary:     Karissa Roach is a 33 y.o. year old with a history of polysubstance abuse and anemia who presented to the ED with RUQ abdominal pain and a sharp pain on the right lateral side. US of gallbladder was normal. CT ABD/Pelvis showed fluid filled non-dilated small bowel. Leukocytosis noted. Do not think this pain is related to her gallbladder. Possible enteritis.    Plan:   Okay to advance diet as tolerated    Chief Complaint:    Pain RUQ of abdomen and radiating laterally     History of Present Illness:     Karissa Roach is a 33 y.o. year old with anemia and polysubstance abuse who has been in recovery since 2/22. She presented to the ED with RUQ and lateral side pain. She reports the pain started last night, describes it as a sharp stitch like pain mainly on the right side of her ribs with some tenderness in the RUQ. Denies N/V/D. Denies fever or chills. Patient states she has had a similar episode like this before back in December, US of gallbladder at that time was also normal and CT showed gastroenteritis.         Past Medical History:   Past Medical History:   Diagnosis Date    Anemia         Past Surgical History:    Past Surgical History:   Procedure Laterality Date    ABDOMINAL SURGERY      D & C WITH SUCTION N/A 9/20/2018    Procedure: DILATATION AND CURETTAGE WITH SUCTION;  Surgeon: Jose Mobley MD;  Location: [facility];  Service: Obstetrics/Gynecology    OTHER SURGICAL HISTORY      removal of endometriosis in 2014       Family History:    Family History   Problem Relation Age of Onset    Malig Hyperthermia Neg Hx        Social History:    Social History     Socioeconomic History    Marital status: Single   Tobacco Use    Smoking status: Every Day     Packs/day: 1     Types: Cigarettes    Smokeless tobacco: Never   Vaping Use    Vaping Use: Never used   Substance and Sexual Activity    Alcohol use: Never     Comment: sober    Drug use: Yes     Comment: opiates, [facility] , last use 12 years ago, states she has taken xanax 10 days ago    Sexual activity: Defer       Allergies:   Allergies   Allergen Reactions    Inapsine [Droperidol] Dystonia       Medications:   Home Meds:  Suboxone 8-2 BID  Docusate 100  Keppra 500mg  Zofran ODT  Thiamine  Trazadone 50mg nightly  Seroquel 25mg nightly      Radiology/Endoscopy:    US of Gallbladder on 2/26 is normal.   CT ABD/Pelvis on 2/26 shows fluid filled non-dilated loops of small bowel        Labs:    Results from last 7 days   Lab Units 02/27/24  0451 02/26/24  1437   WBC 10*3/mm3 20.81* 21.24*   HEMOGLOBIN g/dL 11.8* 13.9   HEMATOCRIT % 35.8 41.1   PLATELETS 10*3/mm3 317 448     Results from last 7 days   Lab Units 02/27/24  0451 02/26/24  1437   SODIUM mmol/L 139 142   POTASSIUM mmol/L 3.9 3.8   CHLORIDE mmol/L 107 106   CO2 mmol/L 22.5 24.1   BUN mg/dL 8 5*   CREATININE mg/dL 0.47* 0.69   CALCIUM mg/dL 8.0* 8.9   BILIRUBIN mg/dL  --  0.2   ALK PHOS U/L  --  73   ALT (SGPT) U/L  --  20   AST (SGOT) U/L  --  14   GLUCOSE mg/dL 88 90       Review of Systems   Constitutional:  Negative for chills, diaphoresis and fever.   HENT:  Negative for swollen glands and trouble swallowing.    Eyes:  Negative for visual disturbance.   Respiratory:  Negative for chest tightness and shortness of breath.    Cardiovascular:  Negative for chest pain and leg swelling.   Gastrointestinal:  Positive for abdominal pain. Negative for abdominal distention, blood in stool, diarrhea, nausea and vomiting.   Genitourinary:  Negative for dysuria and hematuria.   Musculoskeletal:  Negative for arthralgias and myalgias.   Skin:  Negative for rash and wound.   Neurological:  Negative for dizziness, weakness and light-headedness.        Physical Exam  Constitutional:       General: She is not in acute distress.     Appearance: Normal appearance.   HENT:      Head: Normocephalic and atraumatic.   Eyes:      Extraocular Movements: Extraocular movements  intact.      Pupils: Pupils are equal, round, and reactive to light.   Cardiovascular:      Rate and Rhythm: Regular rhythm. Tachycardia present.      Pulses: Normal pulses.      Heart sounds: Normal heart sounds.   Pulmonary:      Effort: Pulmonary effort is normal.      Breath sounds: Normal breath sounds.   Abdominal:      General: There is no distension.      Palpations: Abdomen is soft. There is no mass.      Tenderness: There is abdominal tenderness. There is no guarding or rebound.   Musculoskeletal:         General: Normal range of motion.   Skin:     General: Skin is warm and dry.      Coloration: Skin is not jaundiced.   Neurological:      General: No focal deficit present.      Mental Status: She is alert and oriented to person, place, and time.                 LANDEN Ballesteros   General and Endoscopic Surgery  [facility]    [address], [address]  [place], [place], [address]  P: [phone]  F: [phone]

## 2024-02-27 NOTE — PLAN OF CARE
Goal Outcome Evaluation:  Plan of Care Reviewed With: patient        Progress: no change  Outcome Evaluation: VSS. c/o RUQ pain.  prn toradol given with not much relief.  Pt came from [facility],  Was very anxious this am.  extra dose of toradol given and took her po suboxone this am..  Had showered twice thru the night. NPO. Gen surgery to see today.  IV antibiotics given

## 2024-02-27 NOTE — PROGRESS NOTES
[facility] Medicine Services  PROGRESS NOTE    Patient Name: Karissa Roach  : 1990  MRN: [id number]    Date of Admission: 2024  Primary Care Physician: Tigre Esquivel MD    Subjective   Subjective     CC:  Follow-up abdominal pain    Subjective: Patient still having abdominal pain but is feeling much better than yesterday.  She feels more hydrated but still has poor appetite.  She denies any recent drug use.  I discussed the positive amphetamine finding on her drug screen.  She is uncertain why her drug screen is positive she reports.  She notes that she was supposed to follow-up with GI clinic sometime ago to get a colonoscopy but never went back to the clinic.    Review of Systems  No current fevers or chills  No current shortness of breath or cough  No current chest pain or palpitations       Objective   Objective     Vital Signs:   Temp:  [97 °F (36.1 °C)-98.8 °F (37.1 °C)] 97 °F (36.1 °C)  Heart Rate:  [] 76  Resp:  [16] 16  BP: ()/(54-78) 93/59        Physical Exam:  Constitutional:Awake, alert  HENT: NCAT, mucous membranes moist, neck supple  Respiratory: No cough or wheezes, normal respirations, nonlabored breathing   Cardiovascular: Pulse rate is normal, palpable radial pulses  Gastrointestinal:  soft, right-sided abdominal tenderness, nondistended  Musculoskeletal: Normal musculature for age, no lower extremity edema, BMI 26  Psychiatric: Appropriate affect, cooperative, conversational  Neurologic: No slurred speech or facial droop, follows commands  Skin: No rashes or jaundice, warm      Results Reviewed:  Results from last 7 days   Lab Units 24  0451 24  1437   WBC 10*3/mm3 20.81* 21.24*   HEMOGLOBIN g/dL 11.8* 13.9   HEMATOCRIT % 35.8 41.1   PLATELETS 10*3/mm3 317 448     Results from last 7 days   Lab Units 24  0451 24  1437   SODIUM mmol/L 139 142   POTASSIUM mmol/L 3.9 3.8   CHLORIDE mmol/L 107 106   CO2 mmol/L 22.5 24.1   BUN mg/dL 8 5*    CREATININE mg/dL 0.47* 0.69   GLUCOSE mg/dL 88 90   CALCIUM mg/dL 8.0* 8.9   ALK PHOS U/L  --  73   ALT (SGPT) U/L  --  20   AST (SGOT) U/L  --  14     Estimated Creatinine Clearance: 141.1 mL/min (A) (by C-G formula based on SCr of 0.47 mg/dL (L)).    Microbiology Results Abnormal       Procedure Component Value - Date/Time    Respiratory Panel PCR w/COVID-19(SARS-CoV-2) EAMON/RICARDO/MARIETTA/PAD/COR/MINNIE In-House, NP Swab in UTM/VTM, 2 HR TAT - Swab, Nasopharynx [[id number]]  (Normal) Collected: 02/26/24 1856    Lab Status: Final result Specimen: Swab from Nasopharynx Updated: 02/26/24 2026     ADENOVIRUS, PCR Not Detected     Coronavirus 229E Not Detected     Coronavirus HKU1 Not Detected     Coronavirus NL63 Not Detected     Coronavirus OC43 Not Detected     COVID19 Not Detected     Human Metapneumovirus Not Detected     Human Rhinovirus/Enterovirus Not Detected     Influenza A PCR Not Detected     Influenza B PCR Not Detected     Parainfluenza Virus 1 Not Detected     Parainfluenza Virus 2 Not Detected     Parainfluenza Virus 3 Not Detected     Parainfluenza Virus 4 Not Detected     RSV, PCR Not Detected     Bordetella pertussis pcr Not Detected     Bordetella parapertussis PCR Not Detected     Chlamydophila pneumoniae PCR Not Detected     Mycoplasma pneumo by PCR Not Detected    Narrative:      In the setting of a positive respiratory panel with a viral infection PLUS a negative procalcitonin without other underlying concern for bacterial infection, consider observing off antibiotics or discontinuation of antibiotics and continue supportive care. If the respiratory panel is positive for atypical bacterial infection (Bordetella pertussis, Chlamydophila pneumoniae, or Mycoplasma pneumoniae), consider antibiotic de-escalation to target atypical bacterial infection.            Imaging Results (Last 24 Hours)       Procedure Component Value Units Date/Time    US Gallbladder [[id number]] Collected: 02/26/24 2054     Updated:  02/26/24 2101    Narrative:      US GALLBLADDER-     Clinical: Right upper quadrant pain with leukocytosis.     Correlation with CT of the abdomen and pelvis 2/26/2024     FINDINGS: The visualized portions of the pancreas are normal in  echotexture and shape, no pancreatic mass nor ductal dilatation is  demonstrated. The liver echotexture is within normal limits. Typical  hepatic pedal flow in the main portal vein. The right kidney measures  11.3 cm in length and is normal in appearance. No calculus nor  obstructive uropathy is demonstrated.     The gallbladder is normal. No sludge or gallstones seen. No wall  thickening, pericholecystic fluid or biliary duct dilatation, CBD is 5  mm. No free fluid within the right upper quadrant.     CONCLUSION: Normal sonogram of the right upper quadrant. Specifically no  gallbladder sludge or stones seen.     This report was finalized on 2/26/2024 8:58 PM by Dr. Luca Light M.D  on Workstation: [id number]       CT Abdomen Pelvis With Contrast [[id number]] Collected: 02/26/24 1655     Updated: 02/26/24 1706    Narrative:      CT ABDOMEN AND PELVIS WITH IV CONTRAST     HISTORY: Right-sided abdominal pain     TECHNIQUE: Radiation dose reduction techniques were utilized, including  automated exposure control and exposure modulation based on body size.  Axial images were obtained through the abdomen and pelvis after the  administration of IV contrast. Coronal and sagittal reformatted images  obtained.     COMPARISON: 12/13/2023     FINDINGS:      ABDOMEN:  There is some groundglass opacity in the dependent portions of the lung  bases likely reflecting atelectasis. Liver is unremarkable. There is  some subtle layering material in the gallbladder that may reflect sludge  or noncalcified gallstones. No gallbladder wall thickening or  inflammation. No evidence for biliary ductal dilation. The spleen is  unremarkable. The kidneys and adrenal glands are unremarkable. The  pancreas is  unremarkable. There are some fluid-filled nondilated loops  of small bowel which are nonspecific but can be seen with enteritis.     PELVIS:  Large amount of stool in the colon, correlate for constipation. Appendix  within normal limits. Small right ovarian cyst. Bone windows are  unremarkable.       Impression:      1. Subtle layering material in the gallbladder may reflect layering  sludge or noncalcified gallstones. No gallbladder wall thickening,  inflammation or pericholecystic fluid  2. There are some fluid-filled nondilated loops of small bowel which are  nonspecific but can be seen with enteritis  3. Additional findings as described     Radiation dose reduction techniques were utilized, including automated  exposure control and exposure modulation based on body size.        This report was finalized on 2/26/2024 5:03 PM by Dr. Bipin Roberts M.D on Workstation: [id number]                   I have reviewed the medications:  Scheduled Meds:acetaminophen, 1,000 mg, Oral, BID  buprenorphine-naloxone, 1 film, Sublingual, BID  docusate sodium, 100 mg, Oral, Daily  levETIRAcetam, 500 mg, Oral, Daily  piperacillin-tazobactam, 3.375 g, Intravenous, Q8H  senna-docusate sodium, 2 tablet, Oral, BID   And  polyethylene glycol, 17 g, Oral, Daily  QUEtiapine, 25 mg, Oral, Nightly  sodium chloride, 10 mL, Intravenous, Q12H  traZODone, 50 mg, Oral, Nightly      Continuous Infusions:sodium chloride, 75 mL/hr, Last Rate: 75 mL/hr (02/27/24 1135)      PRN Meds:.  senna-docusate sodium **AND** polyethylene glycol **AND** bisacodyl **AND** bisacodyl    ketorolac    ondansetron    sodium chloride    sodium chloride    sodium chloride    Assessment & Plan   Assessment & Plan     Active Hospital Problems    Diagnosis  POA    **Abdominal pain, acute, right upper quadrant [R10.11]  Yes    Amphetamine positive UDS [F15.929]  Yes    Constipation [K59.00]  Yes    Enteritis [K52.9]  Yes    Acute cystitis without hematuria [N30.00]  Yes     Chronic Leukocytosis [D72.829]  Yes    Opioid abuse [F11.10]  Yes    Anxiety with depression [F41.8]  Yes    Nicotine dependence [F17.200]  Yes      Resolved Hospital Problems   No resolved problems to display.        Brief Hospital Course to date:  Karissa Roach is a 33 y.o. female with past medical history of polysubstance use presents the hospital with acute right-sided abdominal pain, UDS positive for amphetamine, constipation, CT scan concerning for possible enteritis.      Subjective:  Patient seems to have most significant pain on the right side particularly right upper quadrant.  Check HIDA scan to further evaluate gallbladder function.  Consult gastroenterology to weigh in and they are recommending a small bowel follow-through for further evaluation as well.  Ultrasound is less concerning for acute cholecystitis however further workup for now.  Unclear if abdominal pain could be related to amphetamine positive study.  Patient denies amphetamine use however history of substance use is noted.  Patient currently states she has been in rehab most recently. Abdominal pain has been a relatively chronic issue and patient was supposed to be following up regularly in GI clinic however was noncompliant with follow-ups unfortunately.  I have counseled her about regular follow-up and sticking to the recommended treatment plan.    Patient now having bowel movements with constipation.    Urinalysis does show pyuria.  Currently on Zosyn.  Plan to follow-up on urine culture.    Leukocytosis is significantly elevated however reviewing further back in the chart patient has had mostly elevated white blood cell counts in the past.  It was borderline normal at 1 point within the last year but the remainder of her white counts have been elevated.  Patient denies history of splenectomy or other diagnosis that would cause chronically elevated white count.  Clinical significance of white count elevation is unclear at this  point.    Continue Suboxone for history of narcotic dependence and substance use.  Sobriety was counseled.    I am providing encouragement for anxiety and depression.  Continue nightly quetiapine and trazodone.    Patient reports history of seizures and currently on very low-dose Keppra.  I will continue this for now.      IV hydration for dehydration.    Treatment plan discussed at length with the patient is in agreement.        DVT Prophylaxis:  SCDs      Disposition: I expect the patient to be discharged home when better.    CODE STATUS:   Code Status and Medical Interventions:   Ordered at: 02/26/24 1840     Code Status (Patient has no pulse and is not breathing):    CPR (Attempt to Resuscitate)     Medical Interventions (Patient has pulse or is breathing):    Full Support       All Queen MD  02/27/24

## 2024-02-27 NOTE — PROGRESS NOTES
Continued Stay Note  [facility]     Patient Name: Karissa Roach  MRN: [id number]  Today's Date: 2/27/2024    Admit Date: 2/26/2024    Plan: Return to [facility]   Discharge Plan       Row Name 02/27/24 1216       Plan    Plan Return to [facility]    Plan Comments Introduced self and role of CCP. Patient confirmed DC plan is to return to [facility]. Stated she has been there since Thursday (2/22). Stated they will provide transportation back to facility. Stated her father will assist also with return. Call [facility] at [phone] and spoke to  Serena who confirmed patient will be able to return and that they will provide transportation at [place]. Stated for CCP to call direct line at [phone] at time of DC and they will assist with transportation.                   Discharge Codes    No documentation.                       Alayan Costa, RN

## 2024-02-27 NOTE — PLAN OF CARE
Goal Outcome Evaluation:  Plan of Care Reviewed With: patient        Progress: improving  Outcome Evaluation: Patient reports improved abdominal pain/nausea today. Surgery saw patient this AM, no surgical intervention d/t negative gallbladder US. GI consulted. HIDA scan ordered to be done in AM, SBFT ordered to be done this afternoon. Patient remains NPO. IV Toradol/Zofran given x 1. IVF infusing. IV antibiotic given as ordered. Up ad kwasi. VSS.

## 2024-02-28 ENCOUNTER — APPOINTMENT (OUTPATIENT)
Dept: NUCLEAR MEDICINE | Facility: HOSPITAL | Age: 34
End: 2024-02-28
Payer: COMMERCIAL

## 2024-02-28 LAB
ALBUMIN SERPL-MCNC: 3.2 G/DL (ref 3.5–5.2)
ALBUMIN/GLOB SERPL: 1.4 G/DL
ALP SERPL-CCNC: 58 U/L (ref 39–117)
ALT SERPL W P-5'-P-CCNC: 10 U/L (ref 1–33)
ANION GAP SERPL CALCULATED.3IONS-SCNC: 15.7 MMOL/L (ref 5–15)
AST SERPL-CCNC: 11 U/L (ref 1–32)
BACTERIA SPEC AEROBE CULT: NO GROWTH
BILIRUB SERPL-MCNC: 0.3 MG/DL (ref 0–1.2)
BUN SERPL-MCNC: 10 MG/DL (ref 6–20)
BUN/CREAT SERPL: 17.9 (ref 7–25)
CALCIUM SPEC-SCNC: 8.1 MG/DL (ref 8.6–10.5)
CHLORIDE SERPL-SCNC: 105 MMOL/L (ref 98–107)
CO2 SERPL-SCNC: 16.3 MMOL/L (ref 22–29)
CREAT SERPL-MCNC: 0.56 MG/DL (ref 0.57–1)
DEPRECATED RDW RBC AUTO: 42.7 FL (ref 37–54)
EGFRCR SERPLBLD CKD-EPI 2021: 124.5 ML/MIN/1.73
ERYTHROCYTE [DISTWIDTH] IN BLOOD BY AUTOMATED COUNT: 12 % (ref 12.3–15.4)
GLOBULIN UR ELPH-MCNC: 2.3 GM/DL
GLUCOSE SERPL-MCNC: 59 MG/DL (ref 65–99)
HCT VFR BLD AUTO: 34.2 % (ref 34–46.6)
HGB BLD-MCNC: 11.4 G/DL (ref 12–15.9)
MCH RBC QN AUTO: 32.1 PG (ref 26.6–33)
MCHC RBC AUTO-ENTMCNC: 33.3 G/DL (ref 31.5–35.7)
MCV RBC AUTO: 96.3 FL (ref 79–97)
PLATELET # BLD AUTO: 316 10*3/MM3 (ref 140–450)
PMV BLD AUTO: 9.4 FL (ref 6–12)
POTASSIUM SERPL-SCNC: 3.9 MMOL/L (ref 3.5–5.2)
PROT SERPL-MCNC: 5.5 G/DL (ref 6–8.5)
RBC # BLD AUTO: 3.55 10*6/MM3 (ref 3.77–5.28)
SODIUM SERPL-SCNC: 137 MMOL/L (ref 136–145)
WBC NRBC COR # BLD AUTO: 12.31 10*3/MM3 (ref 3.4–10.8)

## 2024-02-28 PROCEDURE — 96376 TX/PRO/DX INJ SAME DRUG ADON: CPT

## 2024-02-28 PROCEDURE — 25010000002 PIPERACILLIN SOD-TAZOBACTAM PER 1 G: Performed by: INTERNAL MEDICINE

## 2024-02-28 PROCEDURE — 99214 OFFICE O/P EST MOD 30 MIN: CPT | Performed by: PHYSICIAN ASSISTANT

## 2024-02-28 PROCEDURE — G0378 HOSPITAL OBSERVATION PER HR: HCPCS

## 2024-02-28 PROCEDURE — 78227 HEPATOBIL SYST IMAGE W/DRUG: CPT

## 2024-02-28 PROCEDURE — 96366 THER/PROPH/DIAG IV INF ADDON: CPT

## 2024-02-28 PROCEDURE — 99231 SBSQ HOSP IP/OBS SF/LOW 25: CPT

## 2024-02-28 PROCEDURE — A9537 TC99M MEBROFENIN: HCPCS | Performed by: INTERNAL MEDICINE

## 2024-02-28 PROCEDURE — 25010000002 ONDANSETRON PER 1 MG: Performed by: INTERNAL MEDICINE

## 2024-02-28 PROCEDURE — 25010000002 SINCALIDE PER 5 MCG: Performed by: INTERNAL MEDICINE

## 2024-02-28 PROCEDURE — 85027 COMPLETE CBC AUTOMATED: CPT | Performed by: INTERNAL MEDICINE

## 2024-02-28 PROCEDURE — 0 TECHNETIUM TC 99M MEBROFENIN KIT: Performed by: INTERNAL MEDICINE

## 2024-02-28 PROCEDURE — 25010000002 KETOROLAC TROMETHAMINE PER 15 MG: Performed by: INTERNAL MEDICINE

## 2024-02-28 PROCEDURE — 80053 COMPREHEN METABOLIC PANEL: CPT | Performed by: INTERNAL MEDICINE

## 2024-02-28 RX ORDER — PANTOPRAZOLE SODIUM 40 MG/1
40 TABLET, DELAYED RELEASE ORAL
Status: DISCONTINUED | OUTPATIENT
Start: 2024-02-28 | End: 2024-03-01 | Stop reason: HOSPADM

## 2024-02-28 RX ORDER — KIT FOR THE PREPARATION OF TECHNETIUM TC 99M MEBROFENIN 45 MG/10ML
1 INJECTION, POWDER, LYOPHILIZED, FOR SOLUTION INTRAVENOUS
Status: COMPLETED | OUTPATIENT
Start: 2024-02-28 | End: 2024-02-28

## 2024-02-28 RX ORDER — PANTOPRAZOLE SODIUM 40 MG/1
40 TABLET, DELAYED RELEASE ORAL
Status: DISCONTINUED | OUTPATIENT
Start: 2024-02-28 | End: 2024-02-28

## 2024-02-28 RX ORDER — POLYETHYLENE GLYCOL 3350 17 G/17G
17 POWDER, FOR SOLUTION ORAL 2 TIMES DAILY
Status: DISCONTINUED | OUTPATIENT
Start: 2024-02-28 | End: 2024-03-01 | Stop reason: HOSPADM

## 2024-02-28 RX ADMIN — PIPERACILLIN SODIUM AND TAZOBACTAM SODIUM 3.38 G: 3; .375 INJECTION, SOLUTION INTRAVENOUS at 18:01

## 2024-02-28 RX ADMIN — SINCALIDE 1.2 MCG: 5 INJECTION, POWDER, LYOPHILIZED, FOR SOLUTION INTRAVENOUS at 08:24

## 2024-02-28 RX ADMIN — DOCUSATE SODIUM 100 MG: 100 CAPSULE, LIQUID FILLED ORAL at 09:26

## 2024-02-28 RX ADMIN — QUETIAPINE FUMARATE 25 MG: 25 TABLET ORAL at 20:49

## 2024-02-28 RX ADMIN — ACETAMINOPHEN 1000 MG: 500 TABLET ORAL at 20:49

## 2024-02-28 RX ADMIN — LEVETIRACETAM 500 MG: 500 TABLET, FILM COATED ORAL at 09:25

## 2024-02-28 RX ADMIN — POLYETHYLENE GLYCOL 3350, SODIUM SULFATE ANHYDROUS, SODIUM BICARBONATE, SODIUM CHLORIDE, POTASSIUM CHLORIDE 2000 ML: 236; 22.74; 6.74; 5.86; 2.97 POWDER, FOR SOLUTION ORAL at 17:58

## 2024-02-28 RX ADMIN — PANTOPRAZOLE SODIUM 40 MG: 40 TABLET, DELAYED RELEASE ORAL at 17:58

## 2024-02-28 RX ADMIN — KETOROLAC TROMETHAMINE 15 MG: 15 INJECTION, SOLUTION INTRAMUSCULAR; INTRAVENOUS at 21:47

## 2024-02-28 RX ADMIN — BUPRENORPHINE AND NALOXONE 1 FILM: 8; 2 FILM BUCCAL; SUBLINGUAL at 09:26

## 2024-02-28 RX ADMIN — PANTOPRAZOLE SODIUM 40 MG: 40 TABLET, DELAYED RELEASE ORAL at 09:26

## 2024-02-28 RX ADMIN — POLYETHYLENE GLYCOL 3350 17 G: 17 POWDER, FOR SOLUTION ORAL at 20:49

## 2024-02-28 RX ADMIN — ACETAMINOPHEN 1000 MG: 500 TABLET ORAL at 09:25

## 2024-02-28 RX ADMIN — BUPRENORPHINE AND NALOXONE 1 FILM: 8; 2 FILM BUCCAL; SUBLINGUAL at 20:49

## 2024-02-28 RX ADMIN — PIPERACILLIN SODIUM AND TAZOBACTAM SODIUM 3.38 G: 3; .375 INJECTION, SOLUTION INTRAVENOUS at 02:24

## 2024-02-28 RX ADMIN — Medication 10 ML: at 20:50

## 2024-02-28 RX ADMIN — MEBROFENIN 1 DOSE: 45 INJECTION, POWDER, LYOPHILIZED, FOR SOLUTION INTRAVENOUS at 06:44

## 2024-02-28 RX ADMIN — KETOROLAC TROMETHAMINE 15 MG: 15 INJECTION, SOLUTION INTRAMUSCULAR; INTRAVENOUS at 02:43

## 2024-02-28 RX ADMIN — TRAZODONE HYDROCHLORIDE 50 MG: 50 TABLET ORAL at 20:49

## 2024-02-28 RX ADMIN — KETOROLAC TROMETHAMINE 15 MG: 15 INJECTION, SOLUTION INTRAMUSCULAR; INTRAVENOUS at 12:09

## 2024-02-28 RX ADMIN — POLYETHYLENE GLYCOL 3350 17 G: 17 POWDER, FOR SOLUTION ORAL at 09:25

## 2024-02-28 RX ADMIN — PIPERACILLIN SODIUM AND TAZOBACTAM SODIUM 3.38 G: 3; .375 INJECTION, SOLUTION INTRAVENOUS at 12:09

## 2024-02-28 RX ADMIN — ONDANSETRON 4 MG: 2 INJECTION INTRAMUSCULAR; INTRAVENOUS at 21:47

## 2024-02-28 NOTE — PROGRESS NOTES
Acute Care General Surgery Progress Note    Patient: Karissa Roach  YOB: 1990  MRN: [id number]      Assessment  Karissa Roach is a 33 y.o. female with right flank and abdominal pain.  Patient describes the pain as a stitch in her side.  Reports she is feeling much better this morning and would like a regular diet.  HIDA scan done this morning, gallbladder ejection fraction measured 33%. Upper GI series yesterday showed duodenitis, as well as large volume recumbent GERD.  Patient is currently afebrile, labs and vitals are normal.  WBC trending down.    Plan  No plans for surgical intervention today  Okay for diet from general surgery standpoint      Subjective  No adverse events overnight.  Denies nausea, vomiting, or diarrhea. reports she had a bowel movement last night.  Denies fever or chills.      Objective    Vitals:    02/28/24 0500   BP: 94/57   Pulse: 83   Resp: 16   Temp: 98.1 °F (36.7 °C)   SpO2: 98%         Physical Exam  Constitutional: Alert, oriented, in no acute distress  Respiratory: Normal work of breathing, clear to auscultation throughout, symmetric excursion  Cardiovascular: Well pefursed, RRR, no jugular venous distention evident   Abdominal: Soft, tender to right side, nondistended, bowel sounds normal  Skin: Warm, dry, no jaundice    Labs  WBC 12.31  Hgb 11.4  HCT 34.2  Platelets 316  Sodium 137   potassium 3.9  AST 11   ALT 10  Total bili 0.3    Radiology  Upper GI series  IMPRESSION:  1. Large volume of recumbent gastroesophageal reflux is observed  refluxing to the upper thoracic esophagus.  2. Mild mucosal irregularity of the duodenal bulb, no ulceration seen.  Fold irregularity and thickening of the post bulbar region consistent  with duodenitis.  3. Normal small bowel however the terminal ileum could not be imaged.  Patient's symptoms located within the right lower quadrant.     By electronically signing this report, I, the supervising radiologist,  attest that I was not  present for the procedure(s) but agree with the  final edited report.     This report was finalized on 2/28/2024 6:25 AM by Dr. Luca Light M.D  on Workstation: [id number]    HIDA scan  FINDINGS: The anterior 30 and 60 minute images show normal activity in  the gallbladder, liver, biliary tree, and bowel.     Gallbladder ejection fraction measures 33%.  Normal range is considered  35% or greater.     IMPRESSION:  No findings of cystic duct obstruction. Gallbladder ejection  fraction is slightly below normal range and suggestive of chronic  cholecystitis in the appropriate clinical context.     This report was finalized on 2/28/2024 9:59 AM by Dr. Serge Cruz M.D  on Workstation: [id number]    LANDEN Ballesteros  Acute [facility] General Surgery  [facility]    [address] [place] [address], [address]  [place], [place], [address]  P: [phone]  F: [phone]

## 2024-02-28 NOTE — PROGRESS NOTES
[facility]  Inpatient Progress Note    Reason for Follow Up:  Abd pain    Subjective     Interval History:   Pt reports she had HIDA done this morning, results pending.  Still c/o RUQ pain and nausea. No vomiting.  Last BM 2 days ago.       Current Facility-Administered Medications:     acetaminophen (TYLENOL) tablet 1,000 mg, 1,000 mg, Oral, BID, All Queen MD, 1,000 mg at 02/27/24 2135    sennosides-docusate (PERICOLACE) 8.6-50 MG per tablet 2 tablet, 2 tablet, Oral, BID, 2 tablet at 02/27/24 2135 **AND** polyethylene glycol (MIRALAX) packet 17 g, 17 g, Oral, Daily, 17 g at 02/26/24 2102 **AND** bisacodyl (DULCOLAX) EC tablet 5 mg, 5 mg, Oral, Daily PRN **AND** bisacodyl (DULCOLAX) suppository 10 mg, 10 mg, Rectal, Daily PRN, Tigre Ch MD    buprenorphine-naloxone (SUBOXONE) 8-2 MG film 1 film, 1 film, Sublingual, BID, All Queen MD, 1 film at 02/27/24 1746    docusate sodium (COLACE) capsule 100 mg, 100 mg, Oral, Daily, All Queen MD    ketorolac (TORADOL) injection 15 mg, 15 mg, Intravenous, Q6H PRN, Tigre Ch MD, 15 mg at 02/28/24 0243    levETIRAcetam (KEPPRA) tablet 500 mg, 500 mg, Oral, Daily, All Queen MD, 500 mg at 02/27/24 1029    ondansetron (ZOFRAN) injection 4 mg, 4 mg, Intravenous, Q6H PRN, Tigre Ch MD, 4 mg at 02/27/24 1943    pantoprazole (PROTONIX) EC tablet 40 mg, 40 mg, Oral, Q AM, All Queen MD    piperacillin-tazobactam (ZOSYN) 3.375 g in iso-osmotic dextrose 50 ml (premix), 3.375 g, Intravenous, Q8H, Tigre Ch MD, Last Rate: 0 mL/hr at 02/27/24 1637, 3.375 g at 02/28/24 0224    QUEtiapine (SEROquel) tablet 25 mg, 25 mg, Oral, Nightly, BerniceAll erwin MD, 25 mg at 02/27/24 2135    sodium chloride 0.9 % flush 10 mL, 10 mL, Intravenous, PRN, Emergency, Triage Protocol, MD    sodium chloride 0.9 % flush 10 mL, 10 mL,  Intravenous, Q12H, Tigre Ch MD, 10 mL at 02/27/24 2135    sodium chloride 0.9 % flush 10 mL, 10 mL, Intravenous, PRN, Tigre Ch MD    sodium chloride 0.9 % infusion 40 mL, 40 mL, Intravenous, PRN, Tigre Ch MD    sodium chloride 0.9 % infusion, 75 mL/hr, Intravenous, Continuous, All Queen MD, Last Rate: 75 mL/hr at 02/27/24 1757, 75 mL/hr at 02/27/24 1757    traZODone (DESYREL) tablet 50 mg, 50 mg, Oral, Nightly, All Queen MD, 50 mg at 02/27/24 2135        Objective     Vital Signs  Temp:  [97 °F (36.1 °C)-98.7 °F (37.1 °C)] 98.1 °F (36.7 °C)  Heart Rate:  [] 83  Resp:  [16] 16  BP: ()/(57-81) 94/57  Body mass index is 26.57 kg/m².    Intake/Output Summary (Last 24 hours) at 2/28/2024 0920  Last data filed at 2/28/2024 0224  Gross per 24 hour   Intake 1204 ml   Output 300 ml   Net 904 ml     No intake/output data recorded.     Physical Exam:   General: patient awake, alert and cooperative   Eyes: Normal lids and lashes, no scleral icterus   Abdomen: +mild to mod RUQ pain. Soft, ND    Results Review:     I reviewed the patient's new clinical results.    Results from last 7 days   Lab Units 02/27/24  0451 02/26/24  1437   WBC 10*3/mm3 20.81* 21.24*   HEMOGLOBIN g/dL 11.8* 13.9   HEMATOCRIT % 35.8 41.1   PLATELETS 10*3/mm3 317 448     Results from last 7 days   Lab Units 02/27/24  0451 02/26/24  1437   SODIUM mmol/L 139 142   POTASSIUM mmol/L 3.9 3.8   CHLORIDE mmol/L 107 106   CO2 mmol/L 22.5 24.1   BUN mg/dL 8 5*   CREATININE mg/dL 0.47* 0.69   CALCIUM mg/dL 8.0* 8.9   BILIRUBIN mg/dL  --  0.2   ALK PHOS U/L  --  73   ALT (SGPT) U/L  --  20   AST (SGOT) U/L  --  14   GLUCOSE mg/dL 88 90         Lab Results   Lab Value Date/Time    LIPASE 194 (H) 02/26/2024 1437    LIPASE 16 12/13/2023 1320    LIPASE 7 (L) 12/10/2023 1957    LIPASE 14 05/14/2021 1210       Radiology:  FL Upper GI Double Contrast & SBFT   Final Result   1.  Large volume of recumbent gastroesophageal reflux is observed   refluxing to the upper thoracic esophagus.   2. Mild mucosal irregularity of the duodenal bulb, no ulceration seen.   Fold irregularity and thickening of the post bulbar region consistent   with duodenitis.   3. Normal small bowel however the terminal ileum could not be imaged.   Patient's symptoms located within the right lower quadrant.       By electronically signing this report, I, the supervising radiologist,   attest that I was not present for the procedure(s) but agree with the   final edited report.               This report was finalized on 2/28/2024 6:25 AM by Dr. Luca Light M.D   on Workstation: [id number]          US Gallbladder   Final Result      CT Abdomen Pelvis With Contrast   Final Result   1. Subtle layering material in the gallbladder may reflect layering   sludge or noncalcified gallstones. No gallbladder wall thickening,   inflammation or pericholecystic fluid   2. There are some fluid-filled nondilated loops of small bowel which are   nonspecific but can be seen with enteritis   3. Additional findings as described       Radiation dose reduction techniques were utilized, including automated   exposure control and exposure modulation based on body size.           This report was finalized on 2/26/2024 5:03 PM by Dr. Bipin Roberts M.D on Workstation: [id number]          NM HIDA SCAN WITH PHARMACOLOGICAL INTERVENTION    (Results Pending)       Assessment & Plan     Active Hospital Problems    Diagnosis     **Abdominal pain, acute, right upper quadrant     Amphetamine positive UDS     Constipation     Enteritis     Acute cystitis without hematuria     Chronic Leukocytosis     Opioid abuse     Anxiety with depression     Nicotine dependence          Assessment:   RUQ pain  Nausea   Leukocytosis  Hx of polysubstance abuse- Urine test positive for amphetamine  Possible enteritis on imaging- however no diarrhea.     All problems new  to me   Plan:   HIDA done this morning. Results pending. Will f/u  Upper GI series reviewed which shows large volume of recumbent GERD, there is duodenitis as well. Pt will need EGD to evaluate this, timing to be decided.  Will increase PPI to BID to see if that helps  Increase miralax to BID as no BM since admission  Can have clears from GI perspective    Addendum: HIDA w/ EF of 33%. Per surgery note, no surgery planned for today. Given abnormal finding of Upper GI series and previous plans of colonoscopy, we will go ahead and plan EGD/colonoscopy tomorrow.     -clears today, Npo at midnight. Bowel prep this evening.     I discussed the patients findings and my recommendations with patient.         Christian Vergara PA-C  [facility]  [address] [place] [address] [address] [place] [address]  Office: [phone]

## 2024-02-28 NOTE — PLAN OF CARE
Goal Outcome Evaluation:  Plan of Care Reviewed With: patient        Progress: improving  Outcome Evaluation: Went over the plan of care abd and answered all questions. Vitals stable and pain is well controlled. All medications given without complications, Patient is tolerating her diet. Patient is ambulating in the halls and tolerating her diet. Bowel prep will be started for EGD and colonoscopy tmo

## 2024-02-28 NOTE — PROGRESS NOTES
[facility] Medicine Services  PROGRESS NOTE    Patient Name: Karissa Roach  : 1990  MRN: [id number]    Date of Admission: 2024  Primary Care Physician: Tigre Esquivel MD    Subjective   Subjective     CC:  Follow-up abdominal pain    Subjective:  Patient feeling much better.  Still having right-sided abdominal pain but feels it has improved.  She feels she has had positive response to pantoprazole.  Discussed the findings of her results with her.  She wishes to get her endoscopy completed in the hospital if possible as she is still worried there is something else going on.  She agrees to work on better following up as recommended.  Tolerating Suboxone.    Review of Systems  No current fevers or chills  No current shortness of breath or cough  No current chest pain or palpitations       Objective   Objective     Vital Signs:   Temp:  [97 °F (36.1 °C)-98.7 °F (37.1 °C)] 98.4 °F (36.9 °C)  Heart Rate:  [] 88  Resp:  [16] 16  BP: ()/(57-81) 101/66        Physical Exam:  Constitutional:Awake, alert  HENT: NCAT, mucous membranes moist, neck supple  Respiratory: No cough or wheezes, normal respirations, nonlabored breathing   Cardiovascular: Pulse rate is normal, palpable radial pulses  Gastrointestinal:  soft, improved mild right-sided abdominal tenderness, nondistended  Musculoskeletal: Normal musculature for age, no lower extremity edema, BMI 26  Psychiatric: Appropriate affect, cooperative, conversational  Neurologic: No slurred speech or facial droop, follows commands  Skin: No rashes or jaundice, warm      Results Reviewed:  Results from last 7 days   Lab Units 24  1056 24  0451 24  1437   WBC 10*3/mm3 12.31* 20.81* 21.24*   HEMOGLOBIN g/dL 11.4* 11.8* 13.9   HEMATOCRIT % 34.2 35.8 41.1   PLATELETS 10*3/mm3 316 317 448     Results from last 7 days   Lab Units 24  1056 24  0451 24  1437   SODIUM mmol/L 137 139 142   POTASSIUM mmol/L 3.9 3.9 3.8    CHLORIDE mmol/L 105 107 106   CO2 mmol/L 16.3* 22.5 24.1   BUN mg/dL 10 8 5*   CREATININE mg/dL 0.56* 0.47* 0.69   GLUCOSE mg/dL 59* 88 90   CALCIUM mg/dL 8.1* 8.0* 8.9   ALK PHOS U/L 58  --  73   ALT (SGPT) U/L 10  --  20   AST (SGOT) U/L 11  --  14     Estimated Creatinine Clearance: 118.4 mL/min (A) (by C-G formula based on SCr of 0.56 mg/dL (L)).    Microbiology Results Abnormal       Procedure Component Value - Date/Time    Urine Culture - Urine, Urine, Clean Catch [[id number]]  (Normal) Collected: 02/26/24 1606    Lab Status: Final result Specimen: Urine, Clean Catch Updated: 02/28/24 1208     Urine Culture No growth    Blood Culture - Blood, Arm, Left [[id number]]  (Normal) Collected: 02/26/24 2007    Lab Status: Preliminary result Specimen: Blood from Arm, Left Updated: 02/27/24 2030     Blood Culture No growth at 24 hours    Blood Culture - Blood, Hand, Right [[id number]]  (Normal) Collected: 02/26/24 2008    Lab Status: Preliminary result Specimen: Blood from Hand, Right Updated: 02/27/24 2030     Blood Culture No growth at 24 hours    Narrative:      Less than seven (7) mL's of blood was collected.  Insufficient quantity may yield false negative results.    Respiratory Panel PCR w/COVID-19(SARS-CoV-2) [facility] In-House, NP Swab in [place], 2 HR TAT - Swab, Nasopharynx [[id number]]  (Normal) Collected: 02/26/24 1856    Lab Status: Final result Specimen: Swab from Nasopharynx Updated: 02/26/24 2026     ADENOVIRUS, PCR Not Detected     Coronavirus 229E Not Detected     Coronavirus HKU1 Not Detected     Coronavirus NL63 Not Detected     Coronavirus OC43 Not Detected     COVID19 Not Detected     Human Metapneumovirus Not Detected     Human Rhinovirus/Enterovirus Not Detected     Influenza A PCR Not Detected     Influenza B PCR Not Detected     Parainfluenza Virus 1 Not Detected     Parainfluenza Virus 2 Not Detected     Parainfluenza Virus 3 Not Detected     Parainfluenza Virus 4 Not Detected      RSV, PCR Not Detected     Bordetella pertussis pcr Not Detected     Bordetella parapertussis PCR Not Detected     Chlamydophila pneumoniae PCR Not Detected     Mycoplasma pneumo by PCR Not Detected    Narrative:      In the setting of a positive respiratory panel with a viral infection PLUS a negative procalcitonin without other underlying concern for bacterial infection, consider observing off antibiotics or discontinuation of antibiotics and continue supportive care. If the respiratory panel is positive for atypical bacterial infection (Bordetella pertussis, Chlamydophila pneumoniae, or Mycoplasma pneumoniae), consider antibiotic de-escalation to target atypical bacterial infection.            Imaging Results (Last 24 Hours)       Procedure Component Value Units Date/Time    NM HIDA SCAN WITH PHARMACOLOGICAL INTERVENTION [[id number]] Collected: 02/28/24 0957     Updated: 02/28/24 1002    Narrative:      NUCLEAR MEDICINE HIDA SCAN WITH PHARMACOLOGIC INTERVENTION     HISTORY: Right upper quadrant pain.     TECHNIQUE: HIDA scan with gallbladder functional evaluation was  performed using 5.3 mCi technetium Choletec and 1.2 mcg of  cholecystokinin. Gallbladder sonogram 2/26/2024     FINDINGS: The anterior 30 and 60 minute images show normal activity in  the gallbladder, liver, biliary tree, and bowel.     Gallbladder ejection fraction measures 33%.  Normal range is considered  35% or greater.       Impression:      No findings of cystic duct obstruction. Gallbladder ejection  fraction is slightly below normal range and suggestive of chronic  cholecystitis in the appropriate clinical context.     This report was finalized on 2/28/2024 9:59 AM by Dr. Serge Cruz M.D  on Workstation: [id number]       FL Upper GI Double Contrast & SBFT [[id number]] Collected: 02/27/24 1615     Updated: 02/28/24 0628    Narrative:      EXAMINATION: UPPER GASTROINTESTINAL WITH AIR CONTRAST AND SBFT UNDER  FLUOROSCOPY.     DATE: 2/27/2024      COMPARISON: CT abdomen and pelvis 2/26/2024     CLINICAL HISTORY: 33-year-old female hospitalized with right upper  quadrant pain and nausea.     TOTAL FLUOROSCOPY TIME: 4 minutes 12 seconds  TOTAL NUMBER OF FLUOROSCOPIC IMAGES: 158  REFERENCE AIR KERMA:     DETAILS: Administration of thin barium, thick barium, and air crystals  were provided to the patient. Rapid sequence video clips and spot films  of the entire upper gastrointestinal tract were obtained.     FINDINGS: The preliminary  views of the abdomen show nonobstructive  bowel gas pattern. Air and stool are seen in the colon. No free air is  seen. Osseous structures are intact.     Esophagus:  Normal esophageal peristalsis observed.  The esophageal mucosa is normal  without evidence for a filling defect, ulceration, stricture or  significant narrowing. No evidence of hiatal hernia is identified. Large  volume of spontaneous gastroesophageal reflux is seen refluxing to the  upper thoracic esophagus while the patient is in the recumbent position.        Stomach:  Barium flows readily through the esophagus and into the stomach. The  stomach distends in a normal fashion and demonstrates a normal mucosal,  with no ulceration or erosion. No extrinsic mass compression is evident.  The contrast passed freely through the stomach with no evidence of  gastroparesis.       Duodenum:.  There was mild mucosal irregularity of the duodenal bulb. The post  bulbar folds are thickened and irregular. The findings are suggest  underlying duodenitis. Third and fourth portions of duodenum have a  satisfactory appearance.     Small bowel:  The column of barium was followed throughout the small bowel to the  cecum. Transit time between 30 and 60 minutes which is normal. Small  bowel caliber is normal. No fold or wall thickening nor extrinsic  displacement is demonstrated. Considerable amount of small bowel  overlying the terminal ileum. The terminal ileum cannot be  evaluated.       Impression:      1. Large volume of recumbent gastroesophageal reflux is observed  refluxing to the upper thoracic esophagus.  2. Mild mucosal irregularity of the duodenal bulb, no ulceration seen.  Fold irregularity and thickening of the post bulbar region consistent  with duodenitis.  3. Normal small bowel however the terminal ileum could not be imaged.  Patient's symptoms located within the right lower quadrant.     By electronically signing this report, I, the supervising radiologist,  attest that I was not present for the procedure(s) but agree with the  final edited report.           This report was finalized on 2/28/2024 6:25 AM by Dr. Luca Light M.D  on Workstation: [id number]                   I have reviewed the medications:  Scheduled Meds:acetaminophen, 1,000 mg, Oral, BID  buprenorphine-naloxone, 1 film, Sublingual, BID  docusate sodium, 100 mg, Oral, Daily  levETIRAcetam, 500 mg, Oral, Daily  pantoprazole, 40 mg, Oral, BID AC  piperacillin-tazobactam, 3.375 g, Intravenous, Q8H  polyethylene glycol, 2,000 mL, Oral, Q12H  polyethylene glycol, 17 g, Oral, BID  QUEtiapine, 25 mg, Oral, Nightly  sodium chloride, 10 mL, Intravenous, Q12H  traZODone, 50 mg, Oral, Nightly      Continuous Infusions:sodium chloride, 75 mL/hr, Last Rate: 75 mL/hr (02/27/24 1757)      PRN Meds:.  ketorolac    ondansetron    sodium chloride    sodium chloride    sodium chloride    Assessment & Plan   Assessment & Plan     Active Hospital Problems    Diagnosis  POA    **Abdominal pain, acute, right upper quadrant [R10.11]  Yes    Amphetamine positive UDS [F15.929]  Yes    Constipation [K59.00]  Yes    Enteritis [K52.9]  Yes    Acute cystitis without hematuria [N30.00]  Yes    Chronic Leukocytosis [D72.829]  Yes    Opioid abuse [F11.10]  Yes    Anxiety with depression [F41.8]  Yes    Nicotine dependence [F17.200]  Yes      Resolved Hospital Problems   No resolved problems to display.        Brief Hospital Course to  date:  Karissa Roach is a 33 y.o. female with past medical history of polysubstance use presents the hospital with acute right-sided abdominal pain, UDS positive for amphetamine, constipation, CT scan concerning for possible enteritis.      Discussion/plan for today:  Imaging studies reviewed this morning discussed with the gastroenterology consult team.  HIDA scan shows mildly low EF.  Unclear if this could be related to Suboxone use.  Ultimately does not seem like a gallbladder is the most likely cause of her pain as she is not having necessarily postprandial pain.    Significant reflux noted on small bowel follow-through.  I initially started Protonix this morning.  I spoke with GI and they recommended to increase it to twice daily which I have done.  Strongly recommend bland diet and substance avoidance is much as possible.    After speaking with gastroenterology team they are planning for EGD/colonoscopy tomorrow to further evaluate.  Possible discharge tomorrow once studies are completed.    Bowel regimen for constipation.    Enteritis on CT scan possibly bacterial enteritis.  Significant leukocytosis has improved with broad-spectrum coverage with Zosyn.  Concern patient was having some early sepsis at time of admission with significant tachycardia and leukocytosis both are improving.    Urinalysis did show some pyuria but this does not seem to be the main focus of her infection.  Urine culture did not grow.  This will be covered still with Zosyn.    Reviewed blood cultures daily, currently negative    Continue Suboxone for history of narcotic dependence and substance use.  Sobriety was counseled.  Patient seems motivated and has been in rehabilitation recently    I am providing encouragement for anxiety and depression.  Continue nightly quetiapine and trazodone.    Patient reports history of seizures and currently on very low-dose Keppra.  I will continue this for now.      She received IV hydration for  dehydration.  Fluid rate and enzyme have been adjusted today.    Treatment plan discussed at length with the patient is in agreement.    DVT Prophylaxis:  SCDs      Disposition: I expect the patient to be discharged home when better.    CODE STATUS:   Code Status and Medical Interventions:   Ordered at: 02/26/24 1840     Code Status (Patient has no pulse and is not breathing):    CPR (Attempt to Resuscitate)     Medical Interventions (Patient has pulse or is breathing):    Full Support       All Queen MD  02/28/24

## 2024-02-28 NOTE — PLAN OF CARE
Goal Outcome Evaluation:  Plan of Care Reviewed With: patient        Progress: improving  Outcome Evaluation: VSS. has rested well thru the night.  prn toradol and scheduled tylenol given with good releif.  up ad kwasi.  IV antibiotics given.  Kept NPO for HIDA Scan today.  .

## 2024-02-29 ENCOUNTER — ANESTHESIA (OUTPATIENT)
Dept: GASTROENTEROLOGY | Facility: HOSPITAL | Age: 34
End: 2024-02-29
Payer: COMMERCIAL

## 2024-02-29 ENCOUNTER — HOSPITAL ENCOUNTER (OUTPATIENT)
Facility: HOSPITAL | Age: 34
Setting detail: HOSPITAL OUTPATIENT SURGERY
End: 2024-02-29
Attending: INTERNAL MEDICINE | Admitting: INTERNAL MEDICINE
Payer: COMMERCIAL

## 2024-02-29 ENCOUNTER — ANESTHESIA EVENT (OUTPATIENT)
Dept: GASTROENTEROLOGY | Facility: HOSPITAL | Age: 34
End: 2024-02-29
Payer: COMMERCIAL

## 2024-02-29 PROBLEM — A04.9 BACTERIAL ENTERITIS: Status: ACTIVE | Noted: 2024-02-29

## 2024-02-29 PROBLEM — K26.9 DUODENAL ULCERATION: Status: ACTIVE | Noted: 2024-02-29

## 2024-02-29 PROBLEM — K59.00 CONSTIPATION: Status: RESOLVED | Noted: 2024-02-26 | Resolved: 2024-02-29

## 2024-02-29 PROBLEM — K29.00 ACUTE SUPERFICIAL GASTRITIS: Status: ACTIVE | Noted: 2024-02-29

## 2024-02-29 LAB
ANION GAP SERPL CALCULATED.3IONS-SCNC: 10.3 MMOL/L (ref 5–15)
BUN SERPL-MCNC: 4 MG/DL (ref 6–20)
BUN/CREAT SERPL: 7 (ref 7–25)
CALCIUM SPEC-SCNC: 8 MG/DL (ref 8.6–10.5)
CHLORIDE SERPL-SCNC: 106 MMOL/L (ref 98–107)
CO2 SERPL-SCNC: 23.7 MMOL/L (ref 22–29)
CREAT SERPL-MCNC: 0.57 MG/DL (ref 0.57–1)
DEPRECATED RDW RBC AUTO: 41.9 FL (ref 37–54)
EGFRCR SERPLBLD CKD-EPI 2021: 124 ML/MIN/1.73
ERYTHROCYTE [DISTWIDTH] IN BLOOD BY AUTOMATED COUNT: 12 % (ref 12.3–15.4)
GLUCOSE SERPL-MCNC: 81 MG/DL (ref 65–99)
HCT VFR BLD AUTO: 33.5 % (ref 34–46.6)
HGB BLD-MCNC: 11.4 G/DL (ref 12–15.9)
MCH RBC QN AUTO: 32.5 PG (ref 26.6–33)
MCHC RBC AUTO-ENTMCNC: 34 G/DL (ref 31.5–35.7)
MCV RBC AUTO: 95.4 FL (ref 79–97)
PLATELET # BLD AUTO: 315 10*3/MM3 (ref 140–450)
PMV BLD AUTO: 8.9 FL (ref 6–12)
POTASSIUM SERPL-SCNC: 3.3 MMOL/L (ref 3.5–5.2)
RBC # BLD AUTO: 3.51 10*6/MM3 (ref 3.77–5.28)
SODIUM SERPL-SCNC: 140 MMOL/L (ref 136–145)
WBC NRBC COR # BLD AUTO: 9.1 10*3/MM3 (ref 3.4–10.8)

## 2024-02-29 PROCEDURE — 43239 EGD BIOPSY SINGLE/MULTIPLE: CPT | Performed by: INTERNAL MEDICINE

## 2024-02-29 PROCEDURE — G0378 HOSPITAL OBSERVATION PER HR: HCPCS

## 2024-02-29 PROCEDURE — 25010000002 KETOROLAC TROMETHAMINE PER 15 MG: Performed by: INTERNAL MEDICINE

## 2024-02-29 PROCEDURE — 25010000002 PROPOFOL 10 MG/ML EMULSION: Performed by: NURSE ANESTHETIST, CERTIFIED REGISTERED

## 2024-02-29 PROCEDURE — 45330 DIAGNOSTIC SIGMOIDOSCOPY: CPT | Performed by: INTERNAL MEDICINE

## 2024-02-29 PROCEDURE — 25010000002 PIPERACILLIN SOD-TAZOBACTAM PER 1 G: Performed by: INTERNAL MEDICINE

## 2024-02-29 PROCEDURE — 96376 TX/PRO/DX INJ SAME DRUG ADON: CPT

## 2024-02-29 PROCEDURE — 25010000002 ONDANSETRON PER 1 MG: Performed by: INTERNAL MEDICINE

## 2024-02-29 PROCEDURE — 88305 TISSUE EXAM BY PATHOLOGIST: CPT | Performed by: INTERNAL MEDICINE

## 2024-02-29 PROCEDURE — 80048 BASIC METABOLIC PNL TOTAL CA: CPT | Performed by: INTERNAL MEDICINE

## 2024-02-29 PROCEDURE — 99231 SBSQ HOSP IP/OBS SF/LOW 25: CPT

## 2024-02-29 PROCEDURE — 85027 COMPLETE CBC AUTOMATED: CPT | Performed by: INTERNAL MEDICINE

## 2024-02-29 PROCEDURE — 25810000003 SODIUM CHLORIDE 0.9 % SOLUTION: Performed by: INTERNAL MEDICINE

## 2024-02-29 PROCEDURE — 25010000002 GLYCOPYRROLATE 0.2 MG/ML SOLUTION: Performed by: NURSE ANESTHETIST, CERTIFIED REGISTERED

## 2024-02-29 RX ORDER — SODIUM CHLORIDE, SODIUM LACTATE, POTASSIUM CHLORIDE, CALCIUM CHLORIDE 600; 310; 30; 20 MG/100ML; MG/100ML; MG/100ML; MG/100ML
30 INJECTION, SOLUTION INTRAVENOUS CONTINUOUS PRN
Status: DISCONTINUED | OUTPATIENT
Start: 2024-02-29 | End: 2024-03-01 | Stop reason: HOSPADM

## 2024-02-29 RX ORDER — GLYCOPYRROLATE 0.2 MG/ML
INJECTION INTRAMUSCULAR; INTRAVENOUS AS NEEDED
Status: DISCONTINUED | OUTPATIENT
Start: 2024-02-29 | End: 2024-02-29 | Stop reason: SURG

## 2024-02-29 RX ORDER — POTASSIUM CHLORIDE 750 MG/1
40 TABLET, FILM COATED, EXTENDED RELEASE ORAL ONCE
Status: COMPLETED | OUTPATIENT
Start: 2024-02-29 | End: 2024-02-29

## 2024-02-29 RX ORDER — PROPOFOL 10 MG/ML
VIAL (ML) INTRAVENOUS CONTINUOUS PRN
Status: DISCONTINUED | OUTPATIENT
Start: 2024-02-29 | End: 2024-02-29 | Stop reason: SURG

## 2024-02-29 RX ORDER — SODIUM CHLORIDE 9 MG/ML
30 INJECTION, SOLUTION INTRAVENOUS CONTINUOUS PRN
Status: DISCONTINUED | OUTPATIENT
Start: 2024-02-29 | End: 2024-03-01 | Stop reason: HOSPADM

## 2024-02-29 RX ORDER — LIDOCAINE HYDROCHLORIDE 20 MG/ML
INJECTION, SOLUTION INFILTRATION; PERINEURAL AS NEEDED
Status: DISCONTINUED | OUTPATIENT
Start: 2024-02-29 | End: 2024-02-29 | Stop reason: SURG

## 2024-02-29 RX ORDER — HYDROXYZINE HYDROCHLORIDE 25 MG/1
25 TABLET, FILM COATED ORAL 3 TIMES DAILY PRN
Status: DISCONTINUED | OUTPATIENT
Start: 2024-02-29 | End: 2024-03-01 | Stop reason: HOSPADM

## 2024-02-29 RX ORDER — ACETAMINOPHEN 500 MG
1000 TABLET ORAL EVERY 8 HOURS PRN
Start: 2024-02-29

## 2024-02-29 RX ORDER — PANTOPRAZOLE SODIUM 40 MG/1
40 TABLET, DELAYED RELEASE ORAL DAILY
Qty: 90 TABLET | Refills: 0 | Status: SHIPPED | OUTPATIENT
Start: 2024-02-29 | End: 2024-03-01 | Stop reason: SDUPTHER

## 2024-02-29 RX ORDER — ACETAMINOPHEN 325 MG/1
650 TABLET ORAL EVERY 4 HOURS PRN
Status: DISCONTINUED | OUTPATIENT
Start: 2024-02-29 | End: 2024-03-01 | Stop reason: HOSPADM

## 2024-02-29 RX ORDER — QUETIAPINE FUMARATE 25 MG/1
25 TABLET, FILM COATED ORAL NIGHTLY
Start: 2024-02-29

## 2024-02-29 RX ADMIN — QUETIAPINE FUMARATE 25 MG: 25 TABLET ORAL at 20:13

## 2024-02-29 RX ADMIN — POLYETHYLENE GLYCOL 3350, SODIUM SULFATE ANHYDROUS, SODIUM BICARBONATE, SODIUM CHLORIDE, POTASSIUM CHLORIDE 2000 ML: 236; 22.74; 6.74; 5.86; 2.97 POWDER, FOR SOLUTION ORAL at 20:00

## 2024-02-29 RX ADMIN — PANTOPRAZOLE SODIUM 40 MG: 40 TABLET, DELAYED RELEASE ORAL at 16:52

## 2024-02-29 RX ADMIN — ONDANSETRON 4 MG: 2 INJECTION INTRAMUSCULAR; INTRAVENOUS at 17:01

## 2024-02-29 RX ADMIN — PIPERACILLIN SODIUM AND TAZOBACTAM SODIUM 3.38 G: 3; .375 INJECTION, SOLUTION INTRAVENOUS at 09:31

## 2024-02-29 RX ADMIN — ACETAMINOPHEN 325MG 650 MG: 325 TABLET ORAL at 17:08

## 2024-02-29 RX ADMIN — BUPRENORPHINE AND NALOXONE 1 FILM: 8; 2 FILM BUCCAL; SUBLINGUAL at 20:13

## 2024-02-29 RX ADMIN — BUPRENORPHINE AND NALOXONE 1 FILM: 8; 2 FILM BUCCAL; SUBLINGUAL at 10:22

## 2024-02-29 RX ADMIN — GLYCOPYRROLATE 0.2 MG: 0.2 INJECTION INTRAMUSCULAR; INTRAVENOUS at 15:25

## 2024-02-29 RX ADMIN — POTASSIUM CHLORIDE 40 MEQ: 750 TABLET, EXTENDED RELEASE ORAL at 16:52

## 2024-02-29 RX ADMIN — ONDANSETRON 4 MG: 2 INJECTION INTRAMUSCULAR; INTRAVENOUS at 10:22

## 2024-02-29 RX ADMIN — KETOROLAC TROMETHAMINE 15 MG: 15 INJECTION, SOLUTION INTRAMUSCULAR; INTRAVENOUS at 04:19

## 2024-02-29 RX ADMIN — Medication 10 ML: at 08:33

## 2024-02-29 RX ADMIN — TRAZODONE HYDROCHLORIDE 50 MG: 50 TABLET ORAL at 20:13

## 2024-02-29 RX ADMIN — POLYETHYLENE GLYCOL 3350, SODIUM SULFATE ANHYDROUS, SODIUM BICARBONATE, SODIUM CHLORIDE, POTASSIUM CHLORIDE 2000 ML: 236; 22.74; 6.74; 5.86; 2.97 POWDER, FOR SOLUTION ORAL at 04:16

## 2024-02-29 RX ADMIN — LEVETIRACETAM 500 MG: 500 TABLET, FILM COATED ORAL at 08:31

## 2024-02-29 RX ADMIN — PIPERACILLIN SODIUM AND TAZOBACTAM SODIUM 3.38 G: 3; .375 INJECTION, SOLUTION INTRAVENOUS at 02:33

## 2024-02-29 RX ADMIN — PANTOPRAZOLE SODIUM 40 MG: 40 TABLET, DELAYED RELEASE ORAL at 05:36

## 2024-02-29 RX ADMIN — KETOROLAC TROMETHAMINE 15 MG: 15 INJECTION, SOLUTION INTRAMUSCULAR; INTRAVENOUS at 10:22

## 2024-02-29 RX ADMIN — PROPOFOL 300 MG: 10 INJECTION, EMULSION INTRAVENOUS at 15:27

## 2024-02-29 RX ADMIN — PIPERACILLIN SODIUM AND TAZOBACTAM SODIUM 3.38 G: 3; .375 INJECTION, SOLUTION INTRAVENOUS at 17:21

## 2024-02-29 RX ADMIN — HYDROXYZINE HYDROCHLORIDE 25 MG: 25 TABLET ORAL at 17:28

## 2024-02-29 RX ADMIN — ACETAMINOPHEN 1000 MG: 500 TABLET ORAL at 08:31

## 2024-02-29 RX ADMIN — Medication 10 ML: at 20:13

## 2024-02-29 RX ADMIN — SODIUM CHLORIDE 30 ML/HR: 9 INJECTION, SOLUTION INTRAVENOUS at 13:55

## 2024-02-29 RX ADMIN — LIDOCAINE HYDROCHLORIDE 60 MG: 20 INJECTION, SOLUTION INFILTRATION; PERINEURAL at 15:25

## 2024-02-29 RX ADMIN — POLYETHYLENE GLYCOL 3350 17 G: 17 POWDER, FOR SOLUTION ORAL at 20:13

## 2024-02-29 RX ADMIN — ONDANSETRON 4 MG: 2 INJECTION INTRAMUSCULAR; INTRAVENOUS at 04:14

## 2024-02-29 NOTE — ANESTHESIA POSTPROCEDURE EVALUATION
"Patient: Karissa Roach    Procedure Summary       Date: 02/29/24 Room / Location:  [facility]    Anesthesia Start: 1522 Anesthesia Stop: 1546    Procedures:       COLONOSCOPY TO 2OCM (ABORTED DUE TO PREP)      ESOPHAGOGASTRODUODENOSCOPY WITH COLD BIOPSIES (Esophagus) Diagnosis:       Abdominal pain, acute, right upper quadrant      (Abdominal pain, acute, right upper quadrant [R10.11])    Surgeons: Fili Rice MD Provider: Jose Adamson MD    Anesthesia Type: Not recorded ASA Status: 3            Anesthesia Type: No value filed.    Vitals  Vitals Value Taken Time   /95 02/29/24 1604   Temp     Pulse 113 02/29/24 1614   Resp 14 02/29/24 1557   SpO2 90 % 02/29/24 1614   Vitals shown include unfiled device data.        Post Anesthesia Care and Evaluation    Patient location during evaluation: bedside  Patient participation: complete - patient participated  Level of consciousness: awake and alert  Pain management: adequate    Airway patency: patent  Anesthetic complications: No anesthetic complications    Cardiovascular status: acceptable  Respiratory status: acceptable  Hydration status: acceptable    Comments: /69 (BP Location: Left arm, Patient Position: Sitting)   Pulse 98   Temp 36.3 °C (97.4 °F) (Oral)   Resp 16   Ht 152.4 cm (60\")   Wt 61.7 kg (136 lb 0.4 oz)   LMP 02/05/2024 (Approximate)   SpO2 99%   BMI 26.57 kg/m²     "

## 2024-02-29 NOTE — PROGRESS NOTES
[facility] Medicine Services  PROGRESS NOTE    Patient Name: Karissa Roach  : 1990  MRN: [id number]    Date of Admission: 2024  Primary Care Physician: Tigre Esquivel MD    Subjective   Subjective     CC:  Follow-up abdominal pain    Subjective:  Abdominal pain continues to improve.  Patient felt she had done pretty well with her bowel prep.  No other new complaints.  She is still having some right-sided abdominal pain but is more right lower quadrant currently.    Review of Systems  No current fevers or chills  No current shortness of breath or cough  No current chest pain or palpitations       Objective   Objective     Vital Signs:   Temp:  [97.3 °F (36.3 °C)-98.1 °F (36.7 °C)] 97.4 °F (36.3 °C)  Heart Rate:  [] 98  Resp:  [12-16] 16  BP: (100-134)/(69-96) 112/69        Physical Exam:  Constitutional:Awake, alert  HENT: NCAT, mucous membranes moist, neck supple  Respiratory: No cough or wheezes, normal respirations, nonlabored breathing   Cardiovascular: Pulse rate is normal, palpable radial pulses  Gastrointestinal:  soft, improved mild right-sided abdominal tenderness, nondistended  Musculoskeletal: Normal musculature for age, no lower extremity edema, BMI 26  Psychiatric: Appropriate affect, cooperative, conversational  Neurologic: No slurred speech or facial droop, follows commands  Skin: No rashes or jaundice, warm      Results Reviewed:  Results from last 7 days   Lab Units 24  1056 24  045   WBC 10*3/mm3 9.10 12.31* 20.81*   HEMOGLOBIN g/dL 11.4* 11.4* 11.8*   HEMATOCRIT % 33.5* 34.2 35.8   PLATELETS 10*3/mm3 315 316 317     Results from last 7 days   Lab Units 24  04524  1056 24  0451 24  1437   SODIUM mmol/L 140 137 139 142   POTASSIUM mmol/L 3.3* 3.9 3.9 3.8   CHLORIDE mmol/L 106 105 107 106   CO2 mmol/L 23.7 16.3* 22.5 24.1   BUN mg/dL 4* 10 8 5*   CREATININE mg/dL 0.57 0.56* 0.47* 0.69   GLUCOSE mg/dL 81 59* 88 90    CALCIUM mg/dL 8.0* 8.1* 8.0* 8.9   ALK PHOS U/L  --  58  --  73   ALT (SGPT) U/L  --  10  --  20   AST (SGOT) U/L  --  11  --  14     Estimated Creatinine Clearance: 116.3 mL/min (by C-G formula based on SCr of 0.57 mg/dL).    Microbiology Results Abnormal       Procedure Component Value - Date/Time    Blood Culture - Blood, Arm, Left [[id number]]  (Normal) Collected: 02/26/24 2007    Lab Status: Preliminary result Specimen: Blood from Arm, Left Updated: 02/28/24 2030     Blood Culture No growth at 2 days    Blood Culture - Blood, Hand, Right [[id number]]  (Normal) Collected: 02/26/24 2008    Lab Status: Preliminary result Specimen: Blood from Hand, Right Updated: 02/28/24 2030     Blood Culture No growth at 2 days    Narrative:      Less than seven (7) mL's of blood was collected.  Insufficient quantity may yield false negative results.    Urine Culture - Urine, Urine, Clean Catch [[id number]]  (Normal) Collected: 02/26/24 1606    Lab Status: Final result Specimen: Urine, Clean Catch Updated: 02/28/24 1208     Urine Culture No growth    Respiratory Panel PCR w/COVID-19(SARS-CoV-2) [facility] In-House, NP Swab in U[facility]/VTM, 2 HR TAT - Swab, Nasopharynx [[id number]]  (Normal) Collected: 02/26/24 1856    Lab Status: Final result Specimen: Swab from Nasopharynx Updated: 02/26/24 2026     ADENOVIRUS, PCR Not Detected     Coronavirus 229E Not Detected     Coronavirus HKU1 Not Detected     Coronavirus NL63 Not Detected     Coronavirus OC43 Not Detected     COVID19 Not Detected     Human Metapneumovirus Not Detected     Human Rhinovirus/Enterovirus Not Detected     Influenza A PCR Not Detected     Influenza B PCR Not Detected     Parainfluenza Virus 1 Not Detected     Parainfluenza Virus 2 Not Detected     Parainfluenza Virus 3 Not Detected     Parainfluenza Virus 4 Not Detected     RSV, PCR Not Detected     Bordetella pertussis pcr Not Detected     Bordetella parapertussis PCR Not Detected     Chlamydophila  pneumoniae PCR Not Detected     Mycoplasma pneumo by PCR Not Detected    Narrative:      In the setting of a positive respiratory panel with a viral infection PLUS a negative procalcitonin without other underlying concern for bacterial infection, consider observing off antibiotics or discontinuation of antibiotics and continue supportive care. If the respiratory panel is positive for atypical bacterial infection (Bordetella pertussis, Chlamydophila pneumoniae, or Mycoplasma pneumoniae), consider antibiotic de-escalation to target atypical bacterial infection.            Imaging Results (Last 24 Hours)       ** No results found for the last 24 hours. **                I have reviewed the medications:  Scheduled Meds:buprenorphine-naloxone, 1 film, Sublingual, BID  docusate sodium, 100 mg, Oral, Daily  levETIRAcetam, 500 mg, Oral, Daily  pantoprazole, 40 mg, Oral, BID AC  piperacillin-tazobactam, 3.375 g, Intravenous, Q8H  polyethylene glycol, 2,000 mL, Oral, Q8H  polyethylene glycol, 17 g, Oral, BID  QUEtiapine, 25 mg, Oral, Nightly  sodium chloride, 10 mL, Intravenous, Q12H  traZODone, 50 mg, Oral, Nightly      Continuous Infusions:lactated ringers, 30 mL/hr  sodium chloride, 30 mL/hr, Last Rate: Stopped (02/29/24 5787)      PRN Meds:.  acetaminophen    lactated ringers    ondansetron    sodium chloride    sodium chloride    sodium chloride    sodium chloride    Assessment & Plan   Assessment & Plan     Active Hospital Problems    Diagnosis  POA    **Abdominal pain, acute, right upper quadrant [R10.11]  Yes    Possible Bacterial enteritis [A04.9]  Yes    Acute superficial gastritis [K29.00]  Yes    Duodenal ulceration [K26.9]  Yes    Amphetamine positive UDS [F15.929]  Yes    Enteritis [K52.9]  Yes    Acute cystitis without hematuria [N30.00]  Yes    Chronic Leukocytosis [D72.829]  Yes    Opioid abuse [F11.10]  Yes    Anxiety with depression [F41.8]  Yes    Nicotine dependence [F17.200]  Yes      Resolved Hospital  Problems    Diagnosis Date Resolved POA    Constipation [K59.00] 02/29/2024 Yes        Brief Hospital Course to date:  Karissa Roach is a 33 y.o. female with past medical history of polysubstance use presents the hospital with acute right-sided abdominal pain, UDS positive for amphetamine, constipation, CT scan concerning for possible enteritis.      Discussion/plan for today:  Patient underwent EGD and colonoscopy today.  EGD did show inflammation in the stomach and ulceration of the duodenum per report.  Awaiting pathology it was reviewed and still pending.  Report reviewed and imaging reviewed.  Plan to change PPI to once daily per GI recommendation.  Change Tylenol to as needed with pain medicine improving.  Stop Toradol per GI recommendation.  Current plan is for repeat colonoscopy tomorrow further bowel prep ordered for today.  Hopefully home tomorrow after completion depending on results.  Discussed with GI APCs daily.    Imaging studies reviewed this morning discussed with the gastroenterology consult team.  HIDA scan shows mildly low EF.  Unclear if this could be related to Suboxone use.  Ultimately does not seem like a gallbladder is the most likely cause of her pain as she is not having necessarily postprandial pain.    Significant reflux noted on small bowel follow-through. Strongly recommend bland diet and substance avoidance is much as possible.    Bowel regimen for constipation.    With abdominal pain and severe leukocytosis CT scan possibly bacterial bacterial enteritis.  Significant leukocytosis has improved with broad-spectrum coverage with Zosyn.  Concern patient was having some early sepsis at time of admission with significant tachycardia and leukocytosis both are improving.  Leukocytosis has now resolved.    Urinalysis did show some pyuria but this does not seem to be the main focus of her infection.  Urine culture did not grow.  This will be covered still with Zosyn.    Reviewed blood  cultures daily, currently negative    Continue Suboxone for history of narcotic dependence and substance use.  Sobriety was counseled.  Patient seems motivated and has been in rehabilitation recently    I am providing encouragement for anxiety and depression.  Continue nightly quetiapine and trazodone.    Patient reports history of seizures and currently on very low-dose Keppra.  I will continue this for now.      She received IV hydration for dehydration.  Fluid rate and enzyme have been adjusted today.    Treatment plan discussed at length with the patient is in agreement.    DVT Prophylaxis:  SCDs      Disposition: I expect the patient to be discharged home possibly tomorrow when cleared by GI    CODE STATUS:   Code Status and Medical Interventions:   Ordered at: 02/26/24 1840     Code Status (Patient has no pulse and is not breathing):    CPR (Attempt to Resuscitate)     Medical Interventions (Patient has pulse or is breathing):    Full Support       All Queen MD  02/29/24

## 2024-02-29 NOTE — PLAN OF CARE
Goal Outcome Evaluation:  Plan of Care Reviewed With: patient        Progress: no change  Outcome Evaluation: VSS. IV Toradol given for pain and Zofran for nausea.Showered last night.  Up ad kwasi, ambulated in the rodriguez.  Bowel prep with Go Lytely on progress, patient  unable to drink full amount due to discomfort. Encouraged. Had BM loose but no clear.  NPO from midnight for Colonoscopy and EGD today. Consent signed.  Slept on and off.

## 2024-02-29 NOTE — ANESTHESIA PREPROCEDURE EVALUATION
Anesthesia Evaluation     Patient summary reviewed and Nursing notes reviewed                Airway   Mallampati: III  TM distance: <3 FB  Neck ROM: full  Possible difficult intubation  Dental      Pulmonary - negative pulmonary ROS   (+) a smoker Current, cigarettes,  Cardiovascular - negative cardio ROS    ECG reviewed  Rhythm: regular  Rate: normal        Neuro/Psych- negative ROS  (+) psychiatric history Anxiety and Depression  GI/Hepatic/Renal/Endo - negative ROS     Musculoskeletal (-) negative ROS    Abdominal    Substance History - negative use  (+) drug use     OB/GYN negative ob/gyn ROS         Other                    Anesthesia Plan    ASA 3     MAC     (Drug abuse  Smoker    AIRWAY)  intravenous induction     Anesthetic plan, risks, benefits, and alternatives have been provided, discussed and informed consent has been obtained with: patient.    CODE STATUS:    Code Status (Patient has no pulse and is not breathing): CPR (Attempt to Resuscitate)  Medical Interventions (Patient has pulse or is breathing): Full Support

## 2024-02-29 NOTE — NURSING NOTE
"Report given to Caitlin Wilson in endoscopy. She was notified that the patient has clear stools but is not yet finished with bowel prep - Caitlin Wilson RN states \"sip and maintain\". They will be here around 3 PM to pick her up for her scopes. Patient to receive 1x dose of potassium when she returns and can possibly discharge later this afternoon pending scope results and GI clearance.   "

## 2024-02-29 NOTE — PROGRESS NOTES
Acute Care General Surgery Progress Note    Patient: Karissa Roach  YOB: 1990  MRN: [id number]      Assessment  Karissa Roach is a 33 y.o. female with complaints of right flank and upper quadrant abdominal pain.  Patient reports she is feeling much better today, denies any pain today.  Plans for EGD and colonoscopy today with GI.  Patient is afebrile, vitals and labs are stable.    Plan  HIDA scan yesterday showed gallbladder ejection fraction of 33%, plans for surgical intervention warranted at this time, we will have patient follow-up in 2 weeks with Dr. Ventura.   General surgery will sign off, but remain available if needed    Subjective  Denies nausea or vomiting.  Denies pain today.  Having multiple bowel movements with her bowel prep.      Objective    Vitals:    02/29/24 0930   BP: 126/82   Pulse: 62   Resp: 16   Temp: 97.4 °F (36.3 °C)   SpO2: 95%          Physical Exam  Constitutional: Alert, oriented, in no acute distress  Respiratory: Normal work of breathing, Symmetric excursion  Cardiovascular: Well pefursed, no jugular venous distention evident   Abdominal: Soft, nondistended, nontender  Skin: Warm, dry, no jaundice      Laboratory Results  Results from last 7 days   Lab Units 02/29/24  0457 02/28/24  1056 02/27/24  0451 02/26/24  1437   WBC 10*3/mm3 9.10 12.31* 20.81* 21.24*   HEMOGLOBIN g/dL 11.4* 11.4* 11.8* 13.9   HEMATOCRIT % 33.5* 34.2 35.8 41.1   PLATELETS 10*3/mm3 315 316 317 448     Results from last 7 days   Lab Units 02/29/24  0457 02/28/24  1056 02/27/24  0451 02/26/24  1437   SODIUM mmol/L 140 137 139 142   POTASSIUM mmol/L 3.3* 3.9 3.9 3.8   CHLORIDE mmol/L 106 105 107 106   CO2 mmol/L 23.7 16.3* 22.5 24.1   BUN mg/dL 4* 10 8 5*   CREATININE mg/dL 0.57 0.56* 0.47* 0.69   CALCIUM mg/dL 8.0* 8.1* 8.0* 8.9   BILIRUBIN mg/dL  --  0.3  --  0.2   ALK PHOS U/L  --  58  --  73   ALT (SGPT) U/L  --  10  --  20   AST (SGOT) U/L  --  11  --  14   GLUCOSE mg/dL 81 59* 88 90     I  have personally reviewed 2/29 labs        LANDEN Ballesteros  Acute Care General Surgery  [facility]    [address], [address]  [place], [place], [address]  P: [phone]  F: [phone]

## 2024-02-29 NOTE — PLAN OF CARE
Goal Outcome Evaluation:  Plan of Care Reviewed With: patient        Progress: improving  Outcome Evaluation: Colonoscopy and EGD this afternoon. IV zofran/toradol given for pain and nausea. IV abx continued. NPO except for bowel prep - pt has clear stools. Possible DC back to [facility] pending results of scopes and GI clearance. K 3.3 - will give 1x dose when she returns from endo.    Colonoscopy rescheduled for tomorrow morning d/t insufficient bowel prep. Consent signed. Atarax ordered for anxiety. Toradol discontinued and tylenol ordered prn to avoid NSAIDs.

## 2024-03-01 ENCOUNTER — READMISSION MANAGEMENT (OUTPATIENT)
Dept: CALL CENTER | Facility: HOSPITAL | Age: 34
End: 2024-03-01
Payer: COMMERCIAL

## 2024-03-01 ENCOUNTER — ANESTHESIA (OUTPATIENT)
Dept: GASTROENTEROLOGY | Facility: HOSPITAL | Age: 34
End: 2024-03-01
Payer: COMMERCIAL

## 2024-03-01 ENCOUNTER — ANESTHESIA EVENT (OUTPATIENT)
Dept: GASTROENTEROLOGY | Facility: HOSPITAL | Age: 34
End: 2024-03-01
Payer: COMMERCIAL

## 2024-03-01 VITALS
OXYGEN SATURATION: 100 % | TEMPERATURE: 97.1 F | HEART RATE: 80 BPM | DIASTOLIC BLOOD PRESSURE: 83 MMHG | WEIGHT: 136.02 LBS | BODY MASS INDEX: 26.71 KG/M2 | RESPIRATION RATE: 18 BRPM | SYSTOLIC BLOOD PRESSURE: 116 MMHG | HEIGHT: 60 IN

## 2024-03-01 PROBLEM — N30.00 ACUTE CYSTITIS WITHOUT HEMATURIA: Status: RESOLVED | Noted: 2023-12-12 | Resolved: 2024-03-01

## 2024-03-01 PROBLEM — D72.829 LEUKOCYTOSIS: Status: RESOLVED | Noted: 2023-12-10 | Resolved: 2024-03-01

## 2024-03-01 LAB
ANION GAP SERPL CALCULATED.3IONS-SCNC: 13.2 MMOL/L (ref 5–15)
BUN SERPL-MCNC: 4 MG/DL (ref 6–20)
BUN/CREAT SERPL: 7.3 (ref 7–25)
CALCIUM SPEC-SCNC: 8.6 MG/DL (ref 8.6–10.5)
CHLORIDE SERPL-SCNC: 105 MMOL/L (ref 98–107)
CO2 SERPL-SCNC: 22.8 MMOL/L (ref 22–29)
CREAT SERPL-MCNC: 0.55 MG/DL (ref 0.57–1)
DEPRECATED RDW RBC AUTO: 43.2 FL (ref 37–54)
EGFRCR SERPLBLD CKD-EPI 2021: 125.1 ML/MIN/1.73
ERYTHROCYTE [DISTWIDTH] IN BLOOD BY AUTOMATED COUNT: 12.3 % (ref 12.3–15.4)
GLUCOSE SERPL-MCNC: 69 MG/DL (ref 65–99)
HCT VFR BLD AUTO: 34.5 % (ref 34–46.6)
HGB BLD-MCNC: 11.8 G/DL (ref 12–15.9)
MCH RBC QN AUTO: 32.9 PG (ref 26.6–33)
MCHC RBC AUTO-ENTMCNC: 34.2 G/DL (ref 31.5–35.7)
MCV RBC AUTO: 96.1 FL (ref 79–97)
PLATELET # BLD AUTO: 344 10*3/MM3 (ref 140–450)
PMV BLD AUTO: 9.5 FL (ref 6–12)
POTASSIUM SERPL-SCNC: 3.7 MMOL/L (ref 3.5–5.2)
RBC # BLD AUTO: 3.59 10*6/MM3 (ref 3.77–5.28)
SODIUM SERPL-SCNC: 141 MMOL/L (ref 136–145)
WBC NRBC COR # BLD AUTO: 11.08 10*3/MM3 (ref 3.4–10.8)

## 2024-03-01 PROCEDURE — 85027 COMPLETE CBC AUTOMATED: CPT | Performed by: INTERNAL MEDICINE

## 2024-03-01 PROCEDURE — G0378 HOSPITAL OBSERVATION PER HR: HCPCS

## 2024-03-01 PROCEDURE — 25010000002 PIPERACILLIN SOD-TAZOBACTAM PER 1 G: Performed by: INTERNAL MEDICINE

## 2024-03-01 PROCEDURE — 45330 DIAGNOSTIC SIGMOIDOSCOPY: CPT | Performed by: INTERNAL MEDICINE

## 2024-03-01 PROCEDURE — 25810000003 SODIUM CHLORIDE 0.9 % SOLUTION: Performed by: INTERNAL MEDICINE

## 2024-03-01 PROCEDURE — 25010000002 PROPOFOL 10 MG/ML EMULSION: Performed by: NURSE ANESTHETIST, CERTIFIED REGISTERED

## 2024-03-01 PROCEDURE — 80048 BASIC METABOLIC PNL TOTAL CA: CPT | Performed by: INTERNAL MEDICINE

## 2024-03-01 RX ORDER — AMOXICILLIN AND CLAVULANATE POTASSIUM 875; 125 MG/1; MG/1
1 TABLET, FILM COATED ORAL 2 TIMES DAILY
Qty: 6 TABLET | Refills: 0 | Status: SHIPPED | OUTPATIENT
Start: 2024-03-01 | End: 2024-03-01 | Stop reason: SDUPTHER

## 2024-03-01 RX ORDER — LIDOCAINE HYDROCHLORIDE 20 MG/ML
INJECTION, SOLUTION INFILTRATION; PERINEURAL AS NEEDED
Status: DISCONTINUED | OUTPATIENT
Start: 2024-03-01 | End: 2024-03-01 | Stop reason: SURG

## 2024-03-01 RX ORDER — POLYETHYLENE GLYCOL 3350 17 G/17G
17 POWDER, FOR SOLUTION ORAL DAILY
Start: 2024-03-01

## 2024-03-01 RX ORDER — SODIUM CHLORIDE 9 MG/ML
1000 INJECTION, SOLUTION INTRAVENOUS CONTINUOUS
Status: DISCONTINUED | OUTPATIENT
Start: 2024-03-01 | End: 2024-03-01 | Stop reason: HOSPADM

## 2024-03-01 RX ORDER — PANTOPRAZOLE SODIUM 40 MG/1
40 TABLET, DELAYED RELEASE ORAL DAILY
Qty: 90 TABLET | Refills: 0 | Status: SHIPPED | OUTPATIENT
Start: 2024-03-01

## 2024-03-01 RX ORDER — AMOXICILLIN AND CLAVULANATE POTASSIUM 875; 125 MG/1; MG/1
1 TABLET, FILM COATED ORAL 2 TIMES DAILY
Qty: 6 TABLET | Refills: 0 | Status: SHIPPED | OUTPATIENT
Start: 2024-03-01 | End: 2024-03-04

## 2024-03-01 RX ORDER — PROPOFOL 10 MG/ML
VIAL (ML) INTRAVENOUS AS NEEDED
Status: DISCONTINUED | OUTPATIENT
Start: 2024-03-01 | End: 2024-03-01 | Stop reason: SURG

## 2024-03-01 RX ADMIN — POLYETHYLENE GLYCOL 3350, SODIUM SULFATE ANHYDROUS, SODIUM BICARBONATE, SODIUM CHLORIDE, POTASSIUM CHLORIDE 2000 ML: 236; 22.74; 6.74; 5.86; 2.97 POWDER, FOR SOLUTION ORAL at 05:46

## 2024-03-01 RX ADMIN — POLYETHYLENE GLYCOL 3350 17 G: 17 POWDER, FOR SOLUTION ORAL at 12:06

## 2024-03-01 RX ADMIN — PANTOPRAZOLE SODIUM 40 MG: 40 TABLET, DELAYED RELEASE ORAL at 05:46

## 2024-03-01 RX ADMIN — SODIUM CHLORIDE 30 ML/HR: 9 INJECTION, SOLUTION INTRAVENOUS at 10:26

## 2024-03-01 RX ADMIN — LEVETIRACETAM 500 MG: 500 TABLET, FILM COATED ORAL at 12:09

## 2024-03-01 RX ADMIN — PROPOFOL 160 MCG/KG/MIN: 10 INJECTION, EMULSION INTRAVENOUS at 10:52

## 2024-03-01 RX ADMIN — LIDOCAINE HYDROCHLORIDE 60 MG: 20 INJECTION, SOLUTION INFILTRATION; PERINEURAL at 10:51

## 2024-03-01 RX ADMIN — ACETAMINOPHEN 325MG 650 MG: 325 TABLET ORAL at 02:29

## 2024-03-01 RX ADMIN — PROPOFOL 100 MG: 10 INJECTION, EMULSION INTRAVENOUS at 10:51

## 2024-03-01 RX ADMIN — PIPERACILLIN SODIUM AND TAZOBACTAM SODIUM 3.38 G: 3; .375 INJECTION, SOLUTION INTRAVENOUS at 02:26

## 2024-03-01 RX ADMIN — DOCUSATE SODIUM 100 MG: 100 CAPSULE, LIQUID FILLED ORAL at 12:05

## 2024-03-01 RX ADMIN — HYDROXYZINE HYDROCHLORIDE 25 MG: 25 TABLET ORAL at 02:29

## 2024-03-01 RX ADMIN — BUPRENORPHINE AND NALOXONE 1 FILM: 8; 2 FILM BUCCAL; SUBLINGUAL at 12:06

## 2024-03-01 NOTE — PROGRESS NOTES
Case Management Discharge Note      Final Note: Discharged home. Alayna Costa RN         Selected Continued Care - Discharged on 3/1/2024 Admission date: 2/26/2024 - Discharge disposition: Home or Self Care         Transportation Services  Private: Car    Final Discharge Disposition Code: 01 - home or self-care

## 2024-03-01 NOTE — ANESTHESIA POSTPROCEDURE EVALUATION
"Patient: Karissa Roach    Procedure Summary       Date: 03/01/24 Room / Location:  [facility] /  [facility]    Anesthesia Start: 1045 Anesthesia Stop: 1106    Procedure: COLONOSCOPY to 55cm Diagnosis:       Status post colonoscopy      (Periumbilical abdominal pain [R10.33])    Surgeons: Félix Overton MD Provider: Juma Parish DO    Anesthesia Type: MAC ASA Status: 3            Anesthesia Type: MAC    Vitals  Vitals Value Taken Time   /96 03/01/24 1115   Temp     Pulse 78 03/01/24 1128   Resp 20 03/01/24 1113   SpO2 92 % 03/01/24 1128   Vitals shown include unfiled device data.        Post Anesthesia Care and Evaluation    Patient location during evaluation: bedside  Patient participation: complete - patient participated  Level of consciousness: awake and alert  Pain management: adequate    Airway patency: patent  Anesthetic complications: No anesthetic complications  PONV Status: controlled  Cardiovascular status: acceptable and hemodynamically stable  Respiratory status: acceptable, spontaneous ventilation and nonlabored ventilation  Hydration status: acceptable    Comments: /72   Pulse 97   Temp 36.7 °C (98 °F) (Oral)   Resp 20   Ht 152.4 cm (60\")   Wt 61.7 kg (136 lb 0.4 oz)   LMP 02/05/2024 (Approximate)   SpO2 97%   BMI 26.57 kg/m²       "

## 2024-03-01 NOTE — DISCHARGE SUMMARY
[facility] Medicine Services  DISCHARGE SUMMARY    Patient Name: Karissa Roach  : 1990  MRN: [id number]    Date of Admission: 2024  2:57 PM  Date of Discharge: 3/1/2024  Primary Care Physician: Tigre Esquivel MD    Consults       Date and Time Order Name Status Description    2024  8:33 AM Inpatient Gastroenterology Consult Completed     2024  6:38 PM Inpatient General Surgery Consult Completed     2024  5:33 PM [facility] (on-call MD unless specified) Details              Hospital Course       Active Hospital Problems    Diagnosis  POA    **Abdominal pain, acute, right upper quadrant [R10.11]  Yes    Possible Bacterial enteritis [A04.9]  Yes    Acute superficial gastritis [K29.00]  Yes    Duodenal ulceration [K26.9]  Yes    Amphetamine positive UDS [F15.929]  Yes    Enteritis [K52.9]  Yes    Abdominal pain [R10.9]  Yes    Opioid abuse [F11.10]  Yes    Anxiety with depression [F41.8]  Yes    Nicotine dependence [F17.200]  Yes      Resolved Hospital Problems    Diagnosis Date Resolved POA    Constipation [K59.00] 2024 Yes    Acute cystitis without hematuria [N30.00] 2024 Yes    Chronic Leukocytosis [D72.829] 2024 Yes          Hospital Course:  Karissa Roach is a 33 y.o. female  with past medical history of polysubstance use presents the hospital with acute right-sided abdominal pain, UDS positive for amphetamine, constipation, CT scan concerning for possible enteritis.      Abdominal pain and probable enteritis/intestinal bacterial infection:  Patient was treated for bacterial intestinal infection with Zosyn and her chronic leukocytosis actually did normalize.  After reviewing records in the past she has had moderate to significant leukocytosis for a significant period of time.  Unclear if she has had a chronic smoldering infection or what the cause of this is.  It is currently near normal at the time of discharge.  She will complete several more days of oral  antibiotics on Augmentin and will follow-up with primary care provider and gastroenterology for possible intestinal infection.      She had severe abdominal pain and upper endoscopy showed duodenal ulceration and acute superficial gastritis.  PPI discharge.  She also had 2 attempts of bowel preps but unfortunately despite enema and multiple times of bowel preps that she was unable to have good visualization for colonoscopy and both colonoscopies were aborted.  GI is planning for a several day prep on an outpatient basis that will be arranged outpatient follow-up.  Patient previously was scheduled for outpatient follow-up and did not keep her appointment.  I have counseled her extensively about the importance of adherence with appointments and follow-ups.  She understands that colonoscopy is needed to rule out inflammatory bowel disease or colon polyps or even less likely malignancy but is still very important.  Abdominal symptoms are much better and she is tolerating diet now she will discharge home.  I have counseled her to take daily MiraLAX for constipation as well.    Patient was seen by surgical team for right upper quadrant pain.   Imaging studies reviewed this morning discussed with the gastroenterology consult team.  HIDA scan shows mildly low EF.  Unclear if this could be related to Suboxone use.  Ultimately does not seem like a gallbladder is the most likely cause of her pain as she is not having necessarily postprandial pain.  She will follow-up in 2 weeks in surgery clinic for continued monitoring.  Surgery team is planning to arrange this.  Bowel regimen for constipation with MiraLAX.     GERD:  Significant reflux noted on small bowel follow-through. Strongly recommend bland diet and substance avoidance is much as possible.  PPI discharge.     Possible acute cystitis:  Urinalysis did show some pyuria but this does not seem to be the main focus of her infection.  Urine culture did not grow.  This will be  covered still with Zosyn.     History of substance use disorder, narcotic dependence:  Continue Suboxone for history of narcotic dependence and substance use.  Sobriety was counseled.  Patient seems motivated and has been in rehabilitation recently.  Urine drug screen was positive for amphetamines however patient denies recent use.  Patient has been told to avoid substance use in the future     I am providing encouragement for anxiety and depression.  Continue nightly quetiapine and trazodone.     Patient reports history of seizures and currently on very low-dose Keppra.  I will continue this for now.       She received IV hydration for dehydration.  Dehydration resolved and patient now tolerating oral good oral intake.     Treatment plan discussed at length with the patient is in agreement and she is eager to discharge home today with her father and is going back to her drug treatment program.    At the time of discharge patient was told to take all medications as prescribed, keep all follow-up appointments, and call their doctor or return to the hospital with any worsening or concerning symptoms.    Please note that this note was made using Dragon voice recognition software            Day of Discharge     Subjective: Patient says she is very eager to discharge the hospital.  She is frustrated that her bowel prep was not successful.  Her abdominal pain is drastically better and she is tolerating food.  She agrees to take MiraLAX for her constipation.  She agrees to follow-up closely with primary care provider and gastroenterology.  She understands that a repeat colonoscopy is very important and she agrees to make sure this is arranged.  I discussed this with her father at her request and he also agreed to help.    Vital Signs:   Temp:  [97 °F (36.1 °C)-98.3 °F (36.8 °C)] 97.1 °F (36.2 °C)  Heart Rate:  [] 80  Resp:  [12-20] 18  BP: ()/(61-96) 116/83     Physical Exam:    Constitutional:Awake,  "alert  HENT: NCAT, mucous membranes moist, neck supple  Respiratory: No cough or wheezes, normal respirations, nonlabored breathing   Cardiovascular: Pulse rate is normal, palpable radial pulses  Gastrointestinal:  soft, improved mostly resolved right-sided abdominal tenderness, nondistended  Musculoskeletal: Normal musculature for age, no lower extremity edema, BMI 26  Psychiatric: Appropriate affect, cooperative, conversational  Neurologic: No slurred speech or facial droop, follows commands  Skin: No rashes or jaundice, warm    Pertinent  and/or Most Recent Results     Results from last 7 days   Lab Units 03/01/24  0540 02/29/24  0457 02/28/24  1056 02/27/24  0451 02/26/24  1437   WBC 10*3/mm3 11.08* 9.10 12.31* 20.81* 21.24*   HEMOGLOBIN g/dL 11.8* 11.4* 11.4* 11.8* 13.9   HEMATOCRIT % 34.5 33.5* 34.2 35.8 41.1   PLATELETS 10*3/mm3 344 315 316 317 448   SODIUM mmol/L 141 140 137 139 142   POTASSIUM mmol/L 3.7 3.3* 3.9 3.9 3.8   CHLORIDE mmol/L 105 106 105 107 106   CO2 mmol/L 22.8 23.7 16.3* 22.5 24.1   BUN mg/dL 4* 4* 10 8 5*   CREATININE mg/dL 0.55* 0.57 0.56* 0.47* 0.69   GLUCOSE mg/dL 69 81 59* 88 90   CALCIUM mg/dL 8.6 8.0* 8.1* 8.0* 8.9     Results from last 7 days   Lab Units 02/28/24  1056 02/26/24  1437   BILIRUBIN mg/dL 0.3 0.2   ALK PHOS U/L 58 73   ALT (SGPT) U/L 10 20   AST (SGOT) U/L 11 14           Invalid input(s): \"TG\", \"LDLCALC\", \"LDLREALC\"  Results from last 7 days   Lab Units 02/26/24  1437   LACTATE mmol/L 0.8       Brief Urine Lab Results  (Last result in the past 365 days)        Color   Clarity   Blood   Leuk Est   Nitrite   Protein   CREAT   Urine HCG        02/26/24 1606 Yellow   Clear   Negative   Small (1+)   Negative   Negative                   Microbiology Results Abnormal       Procedure Component Value - Date/Time    Blood Culture - Blood, Arm, Left [[id number]]  (Normal) Collected: 02/26/24 2007    Lab Status: Preliminary result Specimen: Blood from Arm, Left Updated: 02/29/24 " 2030     Blood Culture No growth at 3 days    Blood Culture - Blood, Hand, Right [[id number]]  (Normal) Collected: 02/26/24 2008    Lab Status: Preliminary result Specimen: Blood from Hand, Right Updated: 02/29/24 2030     Blood Culture No growth at 3 days    Narrative:      Less than seven (7) mL's of blood was collected.  Insufficient quantity may yield false negative results.    Urine Culture - Urine, Urine, Clean Catch [[id number]]  (Normal) Collected: 02/26/24 1606    Lab Status: Final result Specimen: Urine, Clean Catch Updated: 02/28/24 1208     Urine Culture No growth    Respiratory Panel PCR w/COVID-19(SARS-CoV-2) EAMON/RICARDO/MARIETTA/PAD/COR/MINNIE In-House, NP Swab in UTM/VTM, 2 HR TAT - Swab, Nasopharynx [[id number]]  (Normal) Collected: 02/26/24 1856    Lab Status: Final result Specimen: Swab from Nasopharynx Updated: 02/26/24 2026     ADENOVIRUS, PCR Not Detected     Coronavirus 229E Not Detected     Coronavirus HKU1 Not Detected     Coronavirus NL63 Not Detected     Coronavirus OC43 Not Detected     COVID19 Not Detected     Human Metapneumovirus Not Detected     Human Rhinovirus/Enterovirus Not Detected     Influenza A PCR Not Detected     Influenza B PCR Not Detected     Parainfluenza Virus 1 Not Detected     Parainfluenza Virus 2 Not Detected     Parainfluenza Virus 3 Not Detected     Parainfluenza Virus 4 Not Detected     RSV, PCR Not Detected     Bordetella pertussis pcr Not Detected     Bordetella parapertussis PCR Not Detected     Chlamydophila pneumoniae PCR Not Detected     Mycoplasma pneumo by PCR Not Detected    Narrative:      In the setting of a positive respiratory panel with a viral infection PLUS a negative procalcitonin without other underlying concern for bacterial infection, consider observing off antibiotics or discontinuation of antibiotics and continue supportive care. If the respiratory panel is positive for atypical bacterial infection (Bordetella pertussis, Chlamydophila pneumoniae, or  Mycoplasma pneumoniae), consider antibiotic de-escalation to target atypical bacterial infection.            Imaging Results (All)       Procedure Component Value Units Date/Time    NM HIDA SCAN WITH PHARMACOLOGICAL INTERVENTION [[id number]] Collected: 02/28/24 0957     Updated: 02/28/24 1002    Narrative:      NUCLEAR MEDICINE HIDA SCAN WITH PHARMACOLOGIC INTERVENTION     HISTORY: Right upper quadrant pain.     TECHNIQUE: HIDA scan with gallbladder functional evaluation was  performed using 5.3 mCi technetium Choletec and 1.2 mcg of  cholecystokinin. Gallbladder sonogram 2/26/2024     FINDINGS: The anterior 30 and 60 minute images show normal activity in  the gallbladder, liver, biliary tree, and bowel.     Gallbladder ejection fraction measures 33%.  Normal range is considered  35% or greater.       Impression:      No findings of cystic duct obstruction. Gallbladder ejection  fraction is slightly below normal range and suggestive of chronic  cholecystitis in the appropriate clinical context.     This report was finalized on 2/28/2024 9:59 AM by Dr. Serge Cruz M.D  on Workstation: [id number]       FL Upper GI Double Contrast & SBFT [[id number]] Collected: 02/27/24 1615     Updated: 02/28/24 0628    Narrative:      EXAMINATION: UPPER GASTROINTESTINAL WITH AIR CONTRAST AND SBFT UNDER  FLUOROSCOPY.     DATE: 2/27/2024     COMPARISON: CT abdomen and pelvis 2/26/2024     CLINICAL HISTORY: 33-year-old female hospitalized with right upper  quadrant pain and nausea.     TOTAL FLUOROSCOPY TIME: 4 minutes 12 seconds  TOTAL NUMBER OF FLUOROSCOPIC IMAGES: 158  REFERENCE AIR KERMA:     DETAILS: Administration of thin barium, thick barium, and air crystals  were provided to the patient. Rapid sequence video clips and spot films  of the entire upper gastrointestinal tract were obtained.     FINDINGS: The preliminary  views of the abdomen show nonobstructive  bowel gas pattern. Air and stool are seen in the colon. No free  air is  seen. Osseous structures are intact.     Esophagus:  Normal esophageal peristalsis observed.  The esophageal mucosa is normal  without evidence for a filling defect, ulceration, stricture or  significant narrowing. No evidence of hiatal hernia is identified. Large  volume of spontaneous gastroesophageal reflux is seen refluxing to the  upper thoracic esophagus while the patient is in the recumbent position.        Stomach:  Barium flows readily through the esophagus and into the stomach. The  stomach distends in a normal fashion and demonstrates a normal mucosal,  with no ulceration or erosion. No extrinsic mass compression is evident.  The contrast passed freely through the stomach with no evidence of  gastroparesis.       Duodenum:.  There was mild mucosal irregularity of the duodenal bulb. The post  bulbar folds are thickened and irregular. The findings are suggest  underlying duodenitis. Third and fourth portions of duodenum have a  satisfactory appearance.     Small bowel:  The column of barium was followed throughout the small bowel to the  cecum. Transit time between 30 and 60 minutes which is normal. Small  bowel caliber is normal. No fold or wall thickening nor extrinsic  displacement is demonstrated. Considerable amount of small bowel  overlying the terminal ileum. The terminal ileum cannot be evaluated.       Impression:      1. Large volume of recumbent gastroesophageal reflux is observed  refluxing to the upper thoracic esophagus.  2. Mild mucosal irregularity of the duodenal bulb, no ulceration seen.  Fold irregularity and thickening of the post bulbar region consistent  with duodenitis.  3. Normal small bowel however the terminal ileum could not be imaged.  Patient's symptoms located within the right lower quadrant.     By electronically signing this report, I, the supervising radiologist,  attest that I was not present for the procedure(s) but agree with the  final edited report.            This report was finalized on 2/28/2024 6:25 AM by Dr. Luca Light M.D  on Workstation: [id number]       US Gallbladder [[id number]] Collected: 02/26/24 2054     Updated: 02/26/24 2101    Narrative:      US GALLBLADDER-     Clinical: Right upper quadrant pain with leukocytosis.     Correlation with CT of the abdomen and pelvis 2/26/2024     FINDINGS: The visualized portions of the pancreas are normal in  echotexture and shape, no pancreatic mass nor ductal dilatation is  demonstrated. The liver echotexture is within normal limits. Typical  hepatic pedal flow in the main portal vein. The right kidney measures  11.3 cm in length and is normal in appearance. No calculus nor  obstructive uropathy is demonstrated.     The gallbladder is normal. No sludge or gallstones seen. No wall  thickening, pericholecystic fluid or biliary duct dilatation, CBD is 5  mm. No free fluid within the right upper quadrant.     CONCLUSION: Normal sonogram of the right upper quadrant. Specifically no  gallbladder sludge or stones seen.     This report was finalized on 2/26/2024 8:58 PM by Dr. Luca Light M.D  on Workstation: [id number]       CT Abdomen Pelvis With Contrast [[id number]] Collected: 02/26/24 1655     Updated: 02/26/24 1706    Narrative:      CT ABDOMEN AND PELVIS WITH IV CONTRAST     HISTORY: Right-sided abdominal pain     TECHNIQUE: Radiation dose reduction techniques were utilized, including  automated exposure control and exposure modulation based on body size.  Axial images were obtained through the abdomen and pelvis after the  administration of IV contrast. Coronal and sagittal reformatted images  obtained.     COMPARISON: 12/13/2023     FINDINGS:      ABDOMEN:  There is some groundglass opacity in the dependent portions of the lung  bases likely reflecting atelectasis. Liver is unremarkable. There is  some subtle layering material in the gallbladder that may reflect sludge  or noncalcified gallstones. No gallbladder  wall thickening or  inflammation. No evidence for biliary ductal dilation. The spleen is  unremarkable. The kidneys and adrenal glands are unremarkable. The  pancreas is unremarkable. There are some fluid-filled nondilated loops  of small bowel which are nonspecific but can be seen with enteritis.     PELVIS:  Large amount of stool in the colon, correlate for constipation. Appendix  within normal limits. Small right ovarian cyst. Bone windows are  unremarkable.       Impression:      1. Subtle layering material in the gallbladder may reflect layering  sludge or noncalcified gallstones. No gallbladder wall thickening,  inflammation or pericholecystic fluid  2. There are some fluid-filled nondilated loops of small bowel which are  nonspecific but can be seen with enteritis  3. Additional findings as described     Radiation dose reduction techniques were utilized, including automated  exposure control and exposure modulation based on body size.        This report was finalized on 2/26/2024 5:03 PM by Dr. Bipin Roberts M.D on Workstation: [id number]                           Plan for Follow-up of Pending Labs/Results: Gastroenterology clinic follow-up  Pending Labs       Order Current Status    Tissue Pathology Exam In process    Blood Culture - Blood, Arm, Left Preliminary result    Blood Culture - Blood, Hand, Right Preliminary result          Discharge Details        Discharge Medications        New Medications        Instructions Start Date   acetaminophen 500 MG tablet  Commonly known as: TYLENOL   1,000 mg, Oral, Every 8 Hours PRN      amoxicillin-clavulanate 875-125 MG per tablet  Commonly known as: AUGMENTIN   1 tablet, Oral, 2 Times Daily      pantoprazole 40 MG EC tablet  Commonly known as: PROTONIX   40 mg, Oral, Daily      polyethylene glycol 17 g packet  Commonly known as: MIRALAX   17 g, Oral, Daily, Available over-the-counter             Changes to Medications        Instructions Start Date    QUEtiapine 25 MG tablet  Commonly known as: SEROquel  What changed: See the new instructions.   25 mg, Oral, Nightly             Continue These Medications        Instructions Start Date   B1 100 MG tablet   1 tablet, Oral, Daily      buprenorphine-naloxone 8-2 MG film film  Commonly known as: SUBOXONE   1 film 2 (Two) Times a Day.      docusate sodium 100 MG capsule  Commonly known as: COLACE   Take 1 capsule by mouth Daily.      levETIRAcetam 500 MG tablet  Commonly known as: KEPPRA   500 mg, Oral, Daily      ondansetron ODT 4 MG disintegrating tablet  Commonly known as: ZOFRAN-ODT   4 mg, Translingual, Every 8 Hours PRN      traZODone 50 MG tablet  Commonly known as: DESYREL   Take 1 tablet by mouth Every Night.               Allergies   Allergen Reactions    Inapsine [Droperidol] Dystonia         Discharge Disposition:  Home or Self Care    Diet:  Hospital:  Diet Order   Procedures    Diet: Regular/House Diet, Gastrointestinal Diets; Low Irritant; Texture: Regular Texture (IDDSI 7); Fluid Consistency: Thin (IDDSI 0)       Activity:  Activity Instructions       Activity as Tolerated                   CODE STATUS:    Code Status and Medical Interventions:   Ordered at: 02/26/24 1840     Code Status (Patient has no pulse and is not breathing):    CPR (Attempt to Resuscitate)     Medical Interventions (Patient has pulse or is breathing):    Full Support           Additional Instructions for the Follow-ups that You Need to Schedule       Discharge Follow-up with PCP   As directed       Currently Documented PCP:    Tigre Esquivel MD    PCP Phone Number:    [phone]     Follow Up Details: Follow-up with primary care provider within 1 week for general hospital follow-up.  Please call today to schedule.        Discharge Follow-up with Specified Provider: Follow-up with Dr. Nowak or his nurse practitioner Ynui Parnell with [facility] gastroenterology as instructed, they are planning to repeat colonoscopy.  Please  call the clinic with questions or to schedu...   As directed      To: Follow-up with Dr. Nowak or his nurse practitioner Yuni Parnell with [facility] gastroenterology as instructed, they are planning to repeat colonoscopy.  Please call the clinic with questions or to schedule.   Follow Up: 1 Month               Follow-up Information       Tigre Esquivel MD .    Specialty: Internal Medicine  Why: Follow-up with primary care provider within 1 week for general hospital follow-up.  Please call today to schedule.  Contact information:  [address]  [facility] [address]  [facility] [id number] [phone]               Merle Ventura MD Follow up in 2 week(s).    Specialties: General Surgery, Breast Surgery  Contact information:  [address]  [place] [address]  [phone]               Fili Rice MD Follow up in 1 month(s).    Specialty: Gastroenterology  Contact information:  [address]  [place] [address]  [phone]                                 All Queen MD  03/01/24      Time Spent on Discharge:  I spent greater than 35 minutes on this discharge activity which included: face-to-face encounter with the patient, reviewing the data in the system, coordination of the care with the nursing staff as well as consultants, documentation, and entering orders.

## 2024-03-01 NOTE — NURSING NOTE
Upon 10 o'clock round, patient saved BM in toilet for RN to see. Stool appeared light yellow and clear.

## 2024-03-01 NOTE — PROGRESS NOTES
Continued Stay Note  Psychiatric     Patient Name: Karissa Roach  MRN: [id number]  Today's Date: 3/1/2024    Admit Date: 2/26/2024    Plan: Return to [facility] on [facility]   Discharge Plan       Row Name 03/01/24 1222       Plan    Plan Return to [facility] on [facility]    Plan Comments Discharge order noted. Spoke to patient who confirmed DC plan is to return to [facility]. Call [facility] at [phone]. Spoke to Constance who confirmed patient is able to return. Constance scheduled [employer] for 3pm pickup. Discussed with Constance  abx and protonix RX. Requested to be filled at [facility] and brought with patient. Updated patient and RN                   Discharge Codes    No documentation.                 Expected Discharge Date and Time       Expected Discharge Date Expected Discharge Time    Mar 1, 2024               Alayna Costa, RN

## 2024-03-01 NOTE — ANESTHESIA PREPROCEDURE EVALUATION
Anesthesia Evaluation     Patient summary reviewed   no history of anesthetic complications:   NPO Solid Status: > 8 hours  NPO Liquid Status: > 2 hours           Airway   Mallampati: III  TM distance: <3 FB  Neck ROM: full  Possible difficult intubation  Dental      Pulmonary     breath sounds clear to auscultation  (+) a smoker Current, cigarettes,  (-) shortness of breath, recent URI  Cardiovascular   Exercise tolerance: good (4-7 METS)    ECG reviewed  Rhythm: regular  Rate: normal    (-) past MI, dysrhythmias, angina      Neuro/Psych  (+) psychiatric history Anxiety and Depression  (-) seizures, CVA  GI/Hepatic/Renal/Endo    (+) PUD  (-)  obesity, no renal disease, diabetes    Musculoskeletal     (-) neck stiffness  Abdominal    Substance History   (+) drug use (h/o polysubstance to include amphetamines, opiates, benszos, cannabis, opiates)     OB/GYN          Other                        Anesthesia Plan    ASA 3     MAC     (Aborted yesterday due to poor prep; retry today    MAC anesthesia discussed with patient and/or patient representative. Risks (including but not limited to intra-op awareness), benefits, and alternatives were discussed. Understanding was voiced with an agreement to proceed with a MAC technique and General as a backup option. )    Anesthetic plan, risks, benefits, and alternatives have been provided, discussed and informed consent has been obtained with: patient.      CODE STATUS:    Code Status (Patient has no pulse and is not breathing): CPR (Attempt to Resuscitate)  Medical Interventions (Patient has pulse or is breathing): Full Support

## 2024-03-01 NOTE — H&P
[facility]  Pre Procedure History & Physical    Chief Complaint:   Abdominal pain    Subjective     HPI:   This 33-year-old female presents the endoscopy suite for colonoscopic examination.  She has complaints of abdominal pain.  She had undergone colonoscopy attempt yesterday but due to poor prep was rescheduled for today.    Past Medical History:   Past Medical History:   Diagnosis Date    Anemia        Past Surgical History:  Past Surgical History:   Procedure Laterality Date    ABDOMINAL SURGERY      COLONOSCOPY N/A 2/29/2024    Procedure: COLONOSCOPY TO 2OCM (ABORTED DUE TO PREP);  Surgeon: Fili Rice MD;  Location: [facility] ENDOSCOPY;  Service: Gastroenterology;  Laterality: N/A;  PRE- ABD PAIN  POST- POOR PREP    D & C WITH SUCTION N/A 9/20/2018    Procedure: DILATATION AND CURETTAGE WITH SUCTION;  Surgeon: Jose Mobley MD;  Location: [facility] MAIN OR;  Service: Obstetrics/Gynecology    ENDOSCOPY N/A 2/29/2024    Procedure: ESOPHAGOGASTRODUODENOSCOPY WITH COLD BIOPSIES;  Surgeon: Fili Rice MD;  Location: [facility] ENDOSCOPY;  Service: Gastroenterology;  Laterality: N/A;  PRE- ABD PAIN  POST- GASTRITIS, DUODENAL EROSIONS    OTHER SURGICAL HISTORY      removal of endometriosis in 2014       Family History:  Family History   Problem Relation Age of Onset    Malig Hyperthermia Neg Hx        Social History:   reports that she has been smoking cigarettes. She has been smoking an average of 1 pack per day. She has never used smokeless tobacco. She reports that she does not currently use alcohol. She reports current drug use.    Medications:   Medications Prior to Admission   Medication Sig Dispense Refill Last Dose    buprenorphine-naloxone (SUBOXONE) 8-2 MG film film 1 film 2 (Two) Times a Day.   2/26/2024 at 0730    docusate sodium (COLACE) 100 MG capsule Take 1 capsule by mouth Daily.   2/26/2024 at 0730    levETIRAcetam (KEPPRA) 500 MG tablet Take 1 tablet by mouth Daily.   2/26/2024  "at 0730    ondansetron ODT (ZOFRAN-ODT) 4 MG disintegrating tablet Place 1 tablet on the tongue Every 8 (Eight) Hours As Needed for Nausea or Vomiting. 30 tablet 0 2/26/2024 at 0730    Thiamine Mononitrate (B1) 100 MG tablet Take 1 tablet by mouth Daily.   2/26/2024 at 0730    traZODone (DESYREL) 50 MG tablet Take 1 tablet by mouth Every Night.   2/25/2024 at 2100    [DISCONTINUED] QUEtiapine (SEROquel) 25 MG tablet           Allergies:  Inapsine [droperidol]    ROS:    Pertinent items are noted in HPI, all other systems reviewed and negative     Objective     Blood pressure 102/79, pulse 65, temperature 98 °F (36.7 °C), temperature source Oral, resp. rate 16, height 152.4 cm (60\"), weight 61.7 kg (136 lb 0.4 oz), last menstrual period 02/05/2024, SpO2 98%, not currently breastfeeding.    Physical Exam   Constitutional: Pt is oriented to person, place, and time and well-developed, well-nourished, and in no distress.   Mouth/Throat: Oropharynx is clear and moist.   Neck: Normal range of motion.   Cardiovascular: Normal rate, regular rhythm and normal heart sounds.    Pulmonary/Chest: Effort normal and breath sounds normal.   Abdominal: Soft. Nontender  Skin: Skin is warm and dry.   Psychiatric: Mood, memory, affect and judgment normal.     Assessment & Plan     Diagnosis:  Abdominal pain    Anticipated Surgical Procedure:  Colonoscopy    The risks, benefits, and alternatives of this procedure have been discussed with the patient or the responsible party- the patient understands and agrees to proceed.                                                          "

## 2024-03-01 NOTE — PLAN OF CARE
Goal Outcome Evaluation:  Plan of Care Reviewed With: patient        Progress: no change  Outcome Evaluation: VSS. Alert and oriented, ambulated in the rodriguez, bowel prep with Go Lytely  on progress. Voided freely. No BM noted. Encouraged to complete the prep. For possible tap water enema this morning. Tylenol given for pain. Atarax given once.Rested well in between.

## 2024-03-02 LAB
BACTERIA SPEC AEROBE CULT: NORMAL
BACTERIA SPEC AEROBE CULT: NORMAL

## 2024-03-04 LAB
LAB AP CASE REPORT: NORMAL
LAB AP DIAGNOSIS COMMENT: NORMAL
PATH REPORT.FINAL DX SPEC: NORMAL
PATH REPORT.GROSS SPEC: NORMAL

## 2024-03-05 ENCOUNTER — READMISSION MANAGEMENT (OUTPATIENT)
Dept: CALL CENTER | Facility: HOSPITAL | Age: 34
End: 2024-03-05
Payer: COMMERCIAL

## 2024-03-05 NOTE — OUTREACH NOTE
Medical Week 1 Survey      Flowsheet Row Responses   [facility], [place], operated by [facility] patient discharged from? [place]   Does the patient have one of the following disease processes/diagnoses(primary or secondary)? Other   Week 1 attempt successful? Yes   Call start time 0938   Revoke Other transitional program  [Rehab facility]   Discharge diagnosis Abdominal pain, acute, right upper quadrant   Person spoke with today (if not patient) and relationship Father            Tasha POLO - Registered Nurse

## 2024-03-09 NOTE — PROGRESS NOTES
03/09/24       Please tell her that pathology from her recent EGD showed inflammation in the duodenum and in the stomach but no evidence of Helicobacter pylori lining the stomach.  Please send a copy of this report to her PCP.  Martha mai

## 2024-03-11 ENCOUNTER — TELEPHONE (OUTPATIENT)
Dept: GASTROENTEROLOGY | Facility: CLINIC | Age: 34
End: 2024-03-11
Payer: COMMERCIAL

## 2024-03-11 NOTE — TELEPHONE ENCOUNTER
----- Message from Fili Rice MD sent at 3/9/2024  4:47 PM EST -----  03/09/24       Please tell her that pathology from her recent EGD showed inflammation in the duodenum and in the stomach but no evidence of Helicobacter pylori lining the stomach.  Please send a copy of this report to her PCP.  Martha kjluther

## 2024-03-12 NOTE — TELEPHONE ENCOUNTER
Called pt and spoke with pt's father on HIPAA and advised of Dr Rice's note. Verb understanding.     Results sent to pcp thru [facility].

## 2024-04-11 ENCOUNTER — TELEPHONE (OUTPATIENT)
Dept: GASTROENTEROLOGY | Facility: CLINIC | Age: 34
End: 2024-04-11
Payer: COMMERCIAL

## 2024-04-11 NOTE — TELEPHONE ENCOUNTER
Hub staff attempted to follow warm transfer process and was unsuccessful     Caller: Karissa Roach    Relationship to patient: Self    Best call back number: [phone]    Patient is needing: PT STATES SHE IS TO SCHEDULE SCOPE BUT NOT SURE IF SHE WILL NEED OV PRIOR TO SCOPE. PLEASE CONTACT PT TO ADVISE AND ASSIST.

## 2024-04-29 NOTE — TELEPHONE ENCOUNTER
Hub staff attempted to follow warm transfer process and was unsuccessful     Caller: Karissa Roach    Relationship to patient: Self    Best call back number: [phone]    Patient is needing:   PATIENT IS CALLING AGAIN TO SCHEDULE SCOPE.  PLEASE CALL BACK ANYTIME TO SCHEDULE.  IF SHE DOES NOT ANSWER IT IS OKAY TO CALL HER FATHER AT:  [phone]

## 2024-05-01 ENCOUNTER — TELEPHONE (OUTPATIENT)
Dept: GASTROENTEROLOGY | Facility: CLINIC | Age: 34
End: 2024-05-01
Payer: COMMERCIAL

## 2024-05-09 ENCOUNTER — TELEPHONE (OUTPATIENT)
Dept: GASTROENTEROLOGY | Facility: CLINIC | Age: 34
End: 2024-05-09
Payer: COMMERCIAL

## 2024-05-14 ENCOUNTER — TELEPHONE (OUTPATIENT)
Dept: GASTROENTEROLOGY | Facility: CLINIC | Age: 34
End: 2024-05-14
Payer: COMMERCIAL

## 2024-05-14 NOTE — TELEPHONE ENCOUNTER
Hub staff attempted to follow warm transfer process and was unsuccessful     Caller: BLAYNE SALAZAR    Relationship to patient: SELF    Best call back number: [phone]    Patient is needing: PT IS CALLING AHMET TO SCHEDULE COLONOSCOPY PLEASE CALL PT BACK

## 2024-05-15 ENCOUNTER — TELEPHONE (OUTPATIENT)
Dept: GASTROENTEROLOGY | Facility: CLINIC | Age: 34
End: 2024-05-15
Payer: COMMERCIAL

## 2024-05-28 ENCOUNTER — TELEPHONE (OUTPATIENT)
Dept: GASTROENTEROLOGY | Facility: CLINIC | Age: 34
End: 2024-05-28
Payer: COMMERCIAL

## 2024-05-28 NOTE — TELEPHONE ENCOUNTER
TYLER WOULD YOU PLEASE LOOK AT THIS FOR ME SHE'S BEEN IN THE [place] I MADE SEVERAL CALLS TO GET SCHEDULED BUT UPON SCHEDULING MAHENDRA IN SCHEDULING STATES THERE IS NO ORDER ON MY SCREEN PT READ SCHEDULE.THANK YOU

## 2024-05-28 NOTE — TELEPHONE ENCOUNTER
Endoscopic evaluation was performed while she was hospitalized.  All she needs is a hospital follow-up.

## 2024-05-29 ENCOUNTER — TELEPHONE (OUTPATIENT)
Dept: GASTROENTEROLOGY | Facility: CLINIC | Age: 34
End: 2024-05-29
Payer: COMMERCIAL

## 2024-06-12 NOTE — DISCHARGE INSTRUCTIONS
Patient is unable to urinate and has not gone since this morning, bladder scan shows 365ml. Dr Mcknight notified, new orders placed.    Warm soaks    Return Precautions    Although you are being discharged from the ED today, I encourage you to return for worsening symptoms.  Things can, and do, change such that treatment at home with medication may not be adequate.      Specifically, return for any of the following:    Chest pain, shortness of breath, pain or nausea and vomiting not controlled by medications provided.    Please make a follow up with your Primary Care Provider for a blood pressure recheck.     YOU HAVE BEEN PRESCRIBED NARCOTIC PAIN MEDICATION    Narcotic pain medications can be addicting; only take them when needed    You have only been given a 2-3 day supply    Do not operate heavy machinery or make important decisions while taking narcotics    Take an over the counters tool softener while taking pain pills to avoid constipation    Do not consume alcohol while taking pain medication as this can be fatal    Do not share your pain medication with others    Store pain medication securely to prevent accidental exposure or death

## 2024-06-21 ENCOUNTER — APPOINTMENT (OUTPATIENT)
Dept: CT IMAGING | Facility: HOSPITAL | Age: 34
End: 2024-06-21
Payer: COMMERCIAL

## 2024-06-21 ENCOUNTER — HOSPITAL ENCOUNTER (EMERGENCY)
Facility: HOSPITAL | Age: 34
Discharge: HOME | End: 2024-06-22
Attending: EMERGENCY MEDICINE
Payer: COMMERCIAL

## 2024-06-21 DIAGNOSIS — K26.9 DUODENAL ULCER: Primary | ICD-10-CM

## 2024-06-21 DIAGNOSIS — F19.10 POLYSUBSTANCE ABUSE: ICD-10-CM

## 2024-06-21 DIAGNOSIS — R10.13 EPIGASTRIC PAIN: ICD-10-CM

## 2024-06-21 LAB
ALBUMIN SERPL-MCNC: 4.5 G/DL (ref 3.5–5.2)
ALBUMIN/GLOB SERPL: 1.6 G/DL
ALP SERPL-CCNC: 61 U/L (ref 39–117)
ALT SERPL W P-5'-P-CCNC: 12 U/L (ref 1–33)
ANION GAP SERPL CALCULATED.3IONS-SCNC: 10.8 MMOL/L (ref 5–15)
AST SERPL-CCNC: 11 U/L (ref 1–32)
BASOPHILS # BLD AUTO: 0.06 10*3/MM3 (ref 0–0.2)
BASOPHILS NFR BLD AUTO: 0.5 % (ref 0–1.5)
BILIRUB SERPL-MCNC: 0.3 MG/DL (ref 0–1.2)
BUN SERPL-MCNC: 6 MG/DL (ref 6–20)
BUN/CREAT SERPL: 9.5 (ref 7–25)
CALCIUM SPEC-SCNC: 9.3 MG/DL (ref 8.6–10.5)
CHLORIDE SERPL-SCNC: 105 MMOL/L (ref 98–107)
CK SERPL-CCNC: 55 U/L (ref 20–180)
CO2 SERPL-SCNC: 25.2 MMOL/L (ref 22–29)
CREAT SERPL-MCNC: 0.63 MG/DL (ref 0.57–1)
D-LACTATE SERPL-SCNC: 1 MMOL/L (ref 0.5–2)
DEPRECATED RDW RBC AUTO: 44 FL (ref 37–54)
EGFRCR SERPLBLD CKD-EPI 2021: 121 ML/MIN/1.73
EOSINOPHIL # BLD AUTO: 0.15 10*3/MM3 (ref 0–0.4)
EOSINOPHIL NFR BLD AUTO: 1.2 % (ref 0.3–6.2)
ERYTHROCYTE [DISTWIDTH] IN BLOOD BY AUTOMATED COUNT: 12.5 % (ref 12.3–15.4)
GLOBULIN UR ELPH-MCNC: 2.8 GM/DL
GLUCOSE SERPL-MCNC: 114 MG/DL (ref 65–99)
HCG SERPL QL: NEGATIVE
HCT VFR BLD AUTO: 43.6 % (ref 34–46.6)
HGB BLD-MCNC: 15 G/DL (ref 12–15.9)
HOLD SPECIMEN: NORMAL
IMM GRANULOCYTES # BLD AUTO: 0.02 10*3/MM3 (ref 0–0.05)
IMM GRANULOCYTES NFR BLD AUTO: 0.2 % (ref 0–0.5)
LIPASE SERPL-CCNC: 8 U/L (ref 13–60)
LYMPHOCYTES # BLD AUTO: 3.7 10*3/MM3 (ref 0.7–3.1)
LYMPHOCYTES NFR BLD AUTO: 28.7 % (ref 19.6–45.3)
MAGNESIUM SERPL-MCNC: 2 MG/DL (ref 1.6–2.6)
MCH RBC QN AUTO: 32.8 PG (ref 26.6–33)
MCHC RBC AUTO-ENTMCNC: 34.4 G/DL (ref 31.5–35.7)
MCV RBC AUTO: 95.4 FL (ref 79–97)
MONOCYTES # BLD AUTO: 1.1 10*3/MM3 (ref 0.1–0.9)
MONOCYTES NFR BLD AUTO: 8.5 % (ref 5–12)
NEUTROPHILS NFR BLD AUTO: 60.9 % (ref 42.7–76)
NEUTROPHILS NFR BLD AUTO: 7.85 10*3/MM3 (ref 1.7–7)
NRBC BLD AUTO-RTO: 0 /100 WBC (ref 0–0.2)
PLATELET # BLD AUTO: 362 10*3/MM3 (ref 140–450)
PMV BLD AUTO: 9.5 FL (ref 6–12)
POTASSIUM SERPL-SCNC: 3.6 MMOL/L (ref 3.5–5.2)
PROT SERPL-MCNC: 7.3 G/DL (ref 6–8.5)
RBC # BLD AUTO: 4.57 10*6/MM3 (ref 3.77–5.28)
SODIUM SERPL-SCNC: 141 MMOL/L (ref 136–145)
WBC NRBC COR # BLD AUTO: 12.88 10*3/MM3 (ref 3.4–10.8)
WHOLE BLOOD HOLD COAG: NORMAL
WHOLE BLOOD HOLD SPECIMEN: NORMAL

## 2024-06-21 PROCEDURE — 83605 ASSAY OF LACTIC ACID: CPT | Performed by: PHYSICIAN ASSISTANT

## 2024-06-21 PROCEDURE — 36415 COLL VENOUS BLD VENIPUNCTURE: CPT

## 2024-06-21 PROCEDURE — 25810000003 SODIUM CHLORIDE 0.9 % SOLUTION: Performed by: PHYSICIAN ASSISTANT

## 2024-06-21 PROCEDURE — 93005 ELECTROCARDIOGRAM TRACING: CPT | Performed by: EMERGENCY MEDICINE

## 2024-06-21 PROCEDURE — 74177 CT ABD & PELVIS W/CONTRAST: CPT

## 2024-06-21 PROCEDURE — 80307 DRUG TEST PRSMV CHEM ANLYZR: CPT | Performed by: PHYSICIAN ASSISTANT

## 2024-06-21 PROCEDURE — 81001 URINALYSIS AUTO W/SCOPE: CPT | Performed by: EMERGENCY MEDICINE

## 2024-06-21 PROCEDURE — 85025 COMPLETE CBC W/AUTO DIFF WBC: CPT | Performed by: EMERGENCY MEDICINE

## 2024-06-21 PROCEDURE — 84703 CHORIONIC GONADOTROPIN ASSAY: CPT | Performed by: EMERGENCY MEDICINE

## 2024-06-21 PROCEDURE — 80053 COMPREHEN METABOLIC PANEL: CPT | Performed by: EMERGENCY MEDICINE

## 2024-06-21 PROCEDURE — 83735 ASSAY OF MAGNESIUM: CPT | Performed by: PHYSICIAN ASSISTANT

## 2024-06-21 PROCEDURE — 93005 ELECTROCARDIOGRAM TRACING: CPT

## 2024-06-21 PROCEDURE — 93010 ELECTROCARDIOGRAM REPORT: CPT | Performed by: INTERNAL MEDICINE

## 2024-06-21 PROCEDURE — 82550 ASSAY OF CK (CPK): CPT | Performed by: PHYSICIAN ASSISTANT

## 2024-06-21 PROCEDURE — 96361 HYDRATE IV INFUSION ADD-ON: CPT

## 2024-06-21 PROCEDURE — 83690 ASSAY OF LIPASE: CPT | Performed by: EMERGENCY MEDICINE

## 2024-06-21 PROCEDURE — 99285 EMERGENCY DEPT VISIT HI MDM: CPT

## 2024-06-21 PROCEDURE — 96374 THER/PROPH/DIAG INJ IV PUSH: CPT

## 2024-06-21 PROCEDURE — 96375 TX/PRO/DX INJ NEW DRUG ADDON: CPT

## 2024-06-21 PROCEDURE — 25510000001 IOPAMIDOL 61 % SOLUTION: Performed by: EMERGENCY MEDICINE

## 2024-06-21 RX ORDER — PANTOPRAZOLE SODIUM 40 MG/10ML
40 INJECTION, POWDER, LYOPHILIZED, FOR SOLUTION INTRAVENOUS ONCE
Status: COMPLETED | OUTPATIENT
Start: 2024-06-21 | End: 2024-06-21

## 2024-06-21 RX ORDER — FAMOTIDINE 10 MG/ML
20 INJECTION, SOLUTION INTRAVENOUS ONCE
Status: COMPLETED | OUTPATIENT
Start: 2024-06-21 | End: 2024-06-21

## 2024-06-21 RX ORDER — SODIUM CHLORIDE 0.9 % (FLUSH) 0.9 %
10 SYRINGE (ML) INJECTION AS NEEDED
Status: DISCONTINUED | OUTPATIENT
Start: 2024-06-21 | End: 2024-06-22 | Stop reason: HOSPADM

## 2024-06-21 RX ORDER — LIDOCAINE HYDROCHLORIDE 20 MG/ML
5 SOLUTION OROPHARYNGEAL ONCE
Status: COMPLETED | OUTPATIENT
Start: 2024-06-21 | End: 2024-06-21

## 2024-06-21 RX ORDER — ALUMINA, MAGNESIA, AND SIMETHICONE 2400; 2400; 240 MG/30ML; MG/30ML; MG/30ML
15 SUSPENSION ORAL ONCE
Status: COMPLETED | OUTPATIENT
Start: 2024-06-21 | End: 2024-06-21

## 2024-06-21 RX ADMIN — IOPAMIDOL 85 ML: 612 INJECTION, SOLUTION INTRAVENOUS at 23:36

## 2024-06-21 RX ADMIN — PANTOPRAZOLE SODIUM 40 MG: 40 INJECTION, POWDER, FOR SOLUTION INTRAVENOUS at 22:28

## 2024-06-21 RX ADMIN — ALUMINUM HYDROXIDE, MAGNESIUM HYDROXIDE, DIMETHICONE 15 ML: 400; 400; 40 SUSPENSION ORAL at 22:26

## 2024-06-21 RX ADMIN — SODIUM CHLORIDE 1000 ML: 9 INJECTION, SOLUTION INTRAVENOUS at 22:41

## 2024-06-21 RX ADMIN — LIDOCAINE HYDROCHLORIDE 5 ML: 20 SOLUTION ORAL at 22:26

## 2024-06-21 RX ADMIN — FAMOTIDINE 20 MG: 10 INJECTION INTRAVENOUS at 22:30

## 2024-06-22 VITALS
TEMPERATURE: 97.9 F | HEIGHT: 61 IN | DIASTOLIC BLOOD PRESSURE: 94 MMHG | WEIGHT: 130 LBS | BODY MASS INDEX: 24.55 KG/M2 | OXYGEN SATURATION: 100 % | SYSTOLIC BLOOD PRESSURE: 126 MMHG | HEART RATE: 94 BPM | RESPIRATION RATE: 18 BRPM

## 2024-06-22 LAB
AMPHET+METHAMPHET UR QL: POSITIVE
BACTERIA UR QL AUTO: ABNORMAL /HPF
BARBITURATES UR QL SCN: NEGATIVE
BENZODIAZ UR QL SCN: POSITIVE
BILIRUB UR QL STRIP: NEGATIVE
CANNABINOIDS SERPL QL: NEGATIVE
CLARITY UR: CLEAR
COCAINE UR QL: POSITIVE
COLOR UR: ABNORMAL
FENTANYL UR-MCNC: POSITIVE NG/ML
GLUCOSE UR STRIP-MCNC: NEGATIVE MG/DL
HGB UR QL STRIP.AUTO: ABNORMAL
HYALINE CASTS UR QL AUTO: ABNORMAL /LPF
KETONES UR QL STRIP: ABNORMAL
LEUKOCYTE ESTERASE UR QL STRIP.AUTO: ABNORMAL
METHADONE UR QL SCN: NEGATIVE
NITRITE UR QL STRIP: NEGATIVE
OPIATES UR QL: NEGATIVE
OXYCODONE UR QL SCN: NEGATIVE
PH UR STRIP.AUTO: 6 [PH] (ref 5–8)
PROT UR QL STRIP: ABNORMAL
RBC # UR STRIP: ABNORMAL /HPF
REF LAB TEST METHOD: ABNORMAL
SP GR UR STRIP: >1.03 (ref 1–1.03)
SQUAMOUS #/AREA URNS HPF: ABNORMAL /HPF
UROBILINOGEN UR QL STRIP: ABNORMAL
WBC # UR STRIP: ABNORMAL /HPF

## 2024-06-22 NOTE — ED PROVIDER NOTES
EMERGENCY DEPARTMENT ENCOUNTER  Room Number:  03/03  PCP: Konstantin Jeffries APRN  Independent Historians: Patient      HPI:  Chief Complaint: had concerns including Abdominal Pain.    A complete HPI/ROS/PMH/PSH/SH/FH are unobtainable due to: None    Chronic or social conditions impacting patient care (Social Determinants of Health): None      Context: Karissa Roach is a 33 y.o. female with a medical history of anxiety depression, PTSD, polysubstance abuse currently on Suboxone who presents emergency department today complaining of periumbilical abdominal pain that began today.  Patient describes the pain as an aching pain that is been just progressively worsening.  She denies palliative or provocative factors.  She reports nausea but denies vomiting.  She denies diarrhea or constipation.  Her last bowel movement was this morning.  She denies black or tarry stools.  Patient was admitted to the hospital a few months ago for abdominal pain and a leukocytosis.  She had a colonoscopy at that time but missed her follow-up appointment with GI due to work.  They were planning on performing repeat colonoscopy for further evaluation of possible inflammatory bowel disease.  Patient denies urinary symptoms.  Her last menstrual period was 2 weeks ago.      Review of prior external notes (non-ED) -and- Review of prior external test results outside of this encounter:   Patient is admitted to the hospital from 2/26/2024 to 3/1/2024 for right-sided abdominal pain and possible enteritis.  Patient was treated for bacterial intestinal infection with Zosyn and her chronic leukocytosis did normalize while in the hospital.  Patient was discharged on oral Augmentin and was to follow-up with GI outpatient.  Patient had endoscopy which showed duodenal ulceration and acute superficial gastritis.  She was prescribed a PPI upon discharge.  They attempted colonoscopy inpatient but bowel prep was inadequate and they were unable to have good  visualization.  There was plan to reschedule outpatient.  The colonoscopy was intended to rule out inflammatory bowel disease.  She was also evaluated for right upper quadrant pain and her HIDA scan showed a mildly low EF but based on her presentation was thought unlikely for her gallbladder to be causing her pain.  They recommended she follow-up with surgery in 2 weeks for evaluation.  They recommended bowel regimen for constipation as they thought this was more likely the source of her pain.    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    Cocaine abuse 04/24/2023    Nicotine dependence 05/09/2014    Psychoactive substance dependence 04/24/2023    Sedative, hypnotic or anxiolytic-related disorder 04/24/2023    Opioid abuse 04/24/2023    Acute distention of stomach 12/11/2023    Acute gastroenteritis 12/12/2023    Enteritis 12/13/2023    Abdominal pain, acute, right upper quadrant 02/26/2024    Amphetamine positive UDS 02/27/2024    Possible Bacterial enteritis 02/29/2024    Acute superficial gastritis 02/29/2024    Duodenal ulceration 02/29/2024     Resolved Ambulatory Problems     Diagnosis Date Noted    Chronic Leukocytosis 12/10/2023    Acute cystitis without hematuria 12/12/2023    Constipation 02/26/2024     Past Medical History:   Diagnosis Date    Anemia          PAST SURGICAL HISTORY  Past Surgical History:   Procedure Laterality Date    ABDOMINAL SURGERY      COLONOSCOPY N/A 2/29/2024    Procedure: COLONOSCOPY TO 2OCM (ABORTED DUE TO PREP);  Surgeon: Fili Rice MD;  Location: [facility] ENDOSCOPY;  Service: Gastroenterology;  Laterality: N/A;  PRE- ABD PAIN  POST- POOR PREP    COLONOSCOPY N/A 3/1/2024    Procedure: COLONOSCOPY to 55cm;  Surgeon: Félix Overton MD;  Location: [facility]  ENDOSCOPY;  Service: Gastroenterology;  Laterality: N/A;  pre: abd pain  post: poor prep    D & C WITH SUCTION N/A 9/20/2018    Procedure: DILATATION AND CURETTAGE WITH SUCTION;  Surgeon: Jose Mobley MD;  Location: [facility] MAIN OR;  Service: Obstetrics/Gynecology    ENDOSCOPY N/A 2/29/2024    Procedure: ESOPHAGOGASTRODUODENOSCOPY WITH COLD BIOPSIES;  Surgeon: Fili Rice MD;  Location: [facility] ENDOSCOPY;  Service: Gastroenterology;  Laterality: N/A;  PRE- ABD PAIN  POST- GASTRITIS, DUODENAL EROSIONS    OTHER SURGICAL HISTORY      removal of endometriosis in 2014         FAMILY HISTORY  Family History   Problem Relation Age of Onset    Malig Hyperthermia Neg Hx          SOCIAL HISTORY  Social History     Socioeconomic History    Marital status: Single   Tobacco Use    Smoking status: Every Day     Current packs/day: 1.00     Types: Cigarettes    Smokeless tobacco: Never   Vaping Use    Vaping status: Never Used   Substance and Sexual Activity    Alcohol use: Not Currently     Comment: sober    Drug use: Yes     Comment: opiates, came from [facility] , last use 12 years ago, states she has taken xanax 10 days ago    Sexual activity: Defer         ALLERGIES  Inapsine [droperidol]      REVIEW OF SYSTEMS  Included in HPI  All systems reviewed and negative except for those discussed in HPI.      PHYSICAL EXAM    I have reviewed the triage vital signs and nursing notes.    ED Triage Vitals   Temp Heart Rate Resp BP SpO2   06/21/24 2143 06/21/24 2142 06/21/24 2142 06/21/24 2145 06/21/24 2142   97.9 °F (36.6 °C) (!) 157 18 122/80 97 %      Temp src Heart Rate Source Patient Position BP Location FiO2 (%)   06/21/24 2143 06/21/24 2142 06/21/24 2142 06/21/24 2142 --   Tympanic Monitor Lying Right arm        Physical Exam  Constitutional:       General: She is not in acute distress.     Appearance: Normal appearance. She is not ill-appearing.   HENT:      Head: Normocephalic and atraumatic.      Nose: Nose normal.       Mouth/Throat:      Mouth: Mucous membranes are moist.   Eyes:      Extraocular Movements: Extraocular movements intact.      Pupils: Pupils are equal, round, and reactive to light.   Cardiovascular:      Rate and Rhythm: Regular rhythm. Tachycardia present.      Pulses: Normal pulses.      Heart sounds: Normal heart sounds.   Pulmonary:      Effort: Pulmonary effort is normal. No respiratory distress.      Breath sounds: Normal breath sounds.   Abdominal:      General: Abdomen is flat. There is no distension.      Palpations: Abdomen is soft.      Tenderness: There is abdominal tenderness in the epigastric area and periumbilical area. There is no guarding or rebound. Negative signs include Mari's sign and McBurney's sign.   Musculoskeletal:         General: Normal range of motion.      Cervical back: Normal range of motion and neck supple.   Skin:     General: Skin is warm and dry.      Capillary Refill: Capillary refill takes less than 2 seconds.   Neurological:      General: No focal deficit present.      Mental Status: She is alert and oriented to person, place, and time.   Psychiatric:         Mood and Affect: Mood normal.         Behavior: Behavior normal.         LAB RESULTS  Recent Results (from the past 24 hour(s))   ECG 12 Lead Tachycardia    Collection Time: 06/21/24  9:53 PM   Result Value Ref Range    QT Interval 311 ms    QTC Interval 440 ms   Comprehensive Metabolic Panel    Collection Time: 06/21/24 10:02 PM    Specimen: Blood   Result Value Ref Range    Glucose 114 (H) 65 - 99 mg/dL    BUN 6 6 - 20 mg/dL    Creatinine 0.63 0.57 - 1.00 mg/dL    Sodium 141 136 - 145 mmol/L    Potassium 3.6 3.5 - 5.2 mmol/L    Chloride 105 98 - 107 mmol/L    CO2 25.2 22.0 - 29.0 mmol/L    Calcium 9.3 8.6 - 10.5 mg/dL    Total Protein 7.3 6.0 - 8.5 g/dL    Albumin 4.5 3.5 - 5.2 g/dL    ALT (SGPT) 12 1 - 33 U/L    AST (SGOT) 11 1 - 32 U/L    Alkaline Phosphatase 61 39 - 117 U/L    Total Bilirubin 0.3 0.0 - 1.2 mg/dL     Globulin 2.8 gm/dL    A/G Ratio 1.6 g/dL    BUN/Creatinine Ratio 9.5 7.0 - 25.0    Anion Gap 10.8 5.0 - 15.0 mmol/L    eGFR 121.0 >60.0 mL/min/1.73   Lipase    Collection Time: 06/21/24 10:02 PM    Specimen: Blood   Result Value Ref Range    Lipase 8 (L) 13 - 60 U/L   hCG, Serum, Qualitative    Collection Time: 06/21/24 10:02 PM    Specimen: Blood   Result Value Ref Range    HCG Qualitative Negative Negative   Green Top (Gel)    Collection Time: 06/21/24 10:02 PM   Result Value Ref Range    Extra Tube Hold for add-ons.    Lavender Top    Collection Time: 06/21/24 10:02 PM   Result Value Ref Range    Extra Tube hold for add-on    Light Blue Top    Collection Time: 06/21/24 10:02 PM   Result Value Ref Range    Extra Tube Hold for add-ons.    CBC Auto Differential    Collection Time: 06/21/24 10:02 PM    Specimen: Blood   Result Value Ref Range    WBC 12.88 (H) 3.40 - 10.80 10*3/mm3    RBC 4.57 3.77 - 5.28 10*6/mm3    Hemoglobin 15.0 12.0 - 15.9 g/dL    Hematocrit 43.6 34.0 - 46.6 %    MCV 95.4 79.0 - 97.0 fL    MCH 32.8 26.6 - 33.0 pg    MCHC 34.4 31.5 - 35.7 g/dL    RDW 12.5 12.3 - 15.4 %    RDW-SD 44.0 37.0 - 54.0 fl    MPV 9.5 6.0 - 12.0 fL    Platelets 362 140 - 450 10*3/mm3    Neutrophil % 60.9 42.7 - 76.0 %    Lymphocyte % 28.7 19.6 - 45.3 %    Monocyte % 8.5 5.0 - 12.0 %    Eosinophil % 1.2 0.3 - 6.2 %    Basophil % 0.5 0.0 - 1.5 %    Immature Grans % 0.2 0.0 - 0.5 %    Neutrophils, Absolute 7.85 (H) 1.70 - 7.00 10*3/mm3    Lymphocytes, Absolute 3.70 (H) 0.70 - 3.10 10*3/mm3    Monocytes, Absolute 1.10 (H) 0.10 - 0.90 10*3/mm3    Eosinophils, Absolute 0.15 0.00 - 0.40 10*3/mm3    Basophils, Absolute 0.06 0.00 - 0.20 10*3/mm3    Immature Grans, Absolute 0.02 0.00 - 0.05 10*3/mm3    nRBC 0.0 0.0 - 0.2 /100 WBC   CK    Collection Time: 06/21/24 10:02 PM    Specimen: Blood   Result Value Ref Range    Creatine Kinase 55 20 - 180 U/L   Magnesium    Collection Time: 06/21/24 10:02 PM    Specimen: Blood   Result Value  Ref Range    Magnesium 2.0 1.6 - 2.6 mg/dL   Lactic Acid, Plasma    Collection Time: 06/21/24 10:18 PM    Specimen: Blood   Result Value Ref Range    Lactate 1.0 0.5 - 2.0 mmol/L   Urinalysis With Microscopic If Indicated (No Culture) - Urine, Clean Catch    Collection Time: 06/21/24 11:33 PM    Specimen: Urine, Clean Catch   Result Value Ref Range    Color, UA Dark Yellow (A) Yellow, Straw    Appearance, UA Clear Clear    pH, UA 6.0 5.0 - 8.0    Specific Gravity, UA >1.030 (H) 1.005 - 1.030    Glucose, UA Negative Negative    Ketones, UA Trace (A) Negative    Bilirubin, UA Negative Negative    Blood, UA Large (3+) (A) Negative    Protein, UA 30 mg/dL (1+) (A) Negative    Leuk Esterase, UA Trace (A) Negative    Nitrite, UA Negative Negative    Urobilinogen, UA 1.0 E.U./dL 0.2 - 1.0 E.U./dL   Urine Drug Screen - Urine, Clean Catch    Collection Time: 06/21/24 11:33 PM    Specimen: Urine, Clean Catch   Result Value Ref Range    Amphet/Methamphet, Screen Positive (A) Negative    Barbiturates Screen, Urine Negative Negative    Benzodiazepine Screen, Urine Positive (A) Negative    Cocaine Screen, Urine Positive (A) Negative    Opiate Screen Negative Negative    THC, Screen, Urine Negative Negative    Methadone Screen, Urine Negative Negative    Oxycodone Screen, Urine Negative Negative    Fentanyl, Urine Positive (A) Negative   Urinalysis, Microscopic Only - Urine, Clean Catch    Collection Time: 06/21/24 11:33 PM    Specimen: Urine, Clean Catch   Result Value Ref Range    RBC, UA 11-20 (A) None Seen, 0-2 /HPF    WBC, UA 3-5 (A) None Seen, 0-2 /HPF    Bacteria, UA None Seen None Seen /HPF    Squamous Epithelial Cells, UA 3-6 (A) None Seen, 0-2 /HPF    Hyaline Casts, UA 7-12 None Seen /LPF    Methodology Automated Microscopy          RADIOLOGY  CT Abdomen Pelvis With Contrast    Result Date: 6/22/2024  CT OF THE ABDOMEN PELVIS WITH CONTRAST  HISTORY: Periumbilical pain  COMPARISON: 3/26/2024  TECHNIQUE: Axial CT imaging  was obtained through the abdomen and pelvis. IV contrast was administered.  FINDINGS: Images through the lung bases are clear. No suspicious hepatic lesions are seen. Liver is enlarged, measuring up to 17.8 cm in craniocaudal dimensions. Stomach, duodenum, adrenal glands, spleen, and pancreas are all normal. Kidneys enhance symmetrically. No hydronephrosis is seen. The patient has a right ovarian cyst, which measures up to 4.8 x 3.8 cm. Left ovary is unremarkable. There is no bowel obstruction. The appendix is normal. No acute osseous abnormalities are seen.      Right ovarian cyst, measuring 4.8 x 3.8 cm.  Radiation dose reduction techniques were utilized, including automated exposure control and exposure modulation based on body size.   This report was finalized on 6/22/2024 12:23 AM by Dr. Nadine Trevino M.D on Workstation: [id number]         MEDICATIONS GIVEN IN ER  Medications   sodium chloride 0.9 % flush 10 mL (has no administration in time range)   famotidine (PEPCID) injection 20 mg (20 mg Intravenous Given 6/21/24 2230)   pantoprazole (PROTONIX) injection 40 mg (40 mg Intravenous Given 6/21/24 2228)   aluminum-magnesium hydroxide-simethicone (MAALOX MAX) 400-400-40 MG/5ML suspension 15 mL (15 mL Oral Given 6/21/24 2226)   Lidocaine Viscous HCl (XYLOCAINE) 2 % solution 5 mL (5 mL Mouth/Throat Given 6/21/24 2226)   sodium chloride 0.9 % bolus 1,000 mL (0 mL Intravenous Stopped 6/22/24 0050)   iopamidol (ISOVUE-300) 61 % injection 100 mL (85 mL Intravenous Given by Other 6/21/24 2336)           OUTPATIENT MEDICATION MANAGEMENT:  Current Facility-Administered Medications Ordered in Epic   Medication Dose Route Frequency Provider Last Rate Last Admin    sodium chloride 0.9 % flush 10 mL  10 mL Intravenous PRN Dayton Medina MD         Current Outpatient Medications Ordered in Epic   Medication Sig Dispense Refill    acetaminophen (TYLENOL) 500 MG tablet Take 2 tablets by mouth Every 8 (Eight) Hours As  Needed for Mild Pain.      buprenorphine-naloxone (SUBOXONE) 8-2 MG film film 1 film 2 (Two) Times a Day.      docusate sodium (COLACE) 100 MG capsule Take 1 capsule by mouth Daily.      levETIRAcetam (KEPPRA) 500 MG tablet Take 1 tablet by mouth Daily.      ondansetron ODT (ZOFRAN-ODT) 4 MG disintegrating tablet Place 1 tablet on the tongue Every 8 (Eight) Hours As Needed for Nausea or Vomiting. 30 tablet 0    pantoprazole (PROTONIX) 40 MG EC tablet Take 1 tablet by mouth Daily. 90 tablet 0    polyethylene glycol (MIRALAX) 17 g packet Take 17 g by mouth Daily. Available over-the-counter      QUEtiapine (SEROquel) 25 MG tablet Take 1 tablet by mouth Every Night.      Thiamine Mononitrate (B1) 100 MG tablet Take 1 tablet by mouth Daily.      traZODone (DESYREL) 50 MG tablet Take 1 tablet by mouth Every Night.               PROGRESS, DATA ANALYSIS, CONSULTS, AND MEDICAL DECISION MAKING  ORDERS PLACED DURING THIS VISIT:  Orders Placed This Encounter   Procedures    CT Abdomen Pelvis With Contrast    Stanton Draw    Comprehensive Metabolic Panel    Lipase    Urinalysis With Microscopic If Indicated (No Culture) - Urine, Clean Catch    hCG, Serum, Qualitative    CBC Auto Differential    Lactic Acid, Plasma    Urine Drug Screen - Urine, Clean Catch    CK    Magnesium    Urinalysis, Microscopic Only - Urine, Clean Catch    NPO Diet NPO Type: Strict NPO    Undress & Gown    ECG 12 Lead Tachycardia    Insert Peripheral IV    CBC & Differential    Green Top (Gel)    Lavender Top    Light Blue Top       All labs have been independently interpreted by me.  All radiology studies have been reviewed by me. All EKG's have been independently viewed and interpreted by me.  Discussion below represents my analysis of pertinent findings related to patient's condition, differential diagnosis, treatment plan and final disposition.    Differential diagnosis includes but is not limited to:   My differential diagnosis for abdominal pain  includes but is not limited to:  Gastritis, gastroenteritis, peptic ulcer disease, GERD, esophageal perforation, acute appendicitis, mesenteric adenitis, Meckel’s diverticulum, epiploic appendagitis, diverticulitis, colon cancer, ulcerative colitis, Crohn’s disease, intussusception, small bowel obstruction, adhesions, ischemic bowel, perforated viscus, ileus, obstipation, biliary colic, cholecystitis, cholelithiasis, John-Rick Oscar, hepatitis, pancreatitis, common bile duct obstruction, cholangitis, bile leak, splenic trauma, splenic rupture, splenic infarction, splenic abscess, abdominal abscess, ascites, spontaneous bacterial peritonitis, hernia, UTI, cystitis, prostatitis, ureterolithiasis, urinary obstruction, ovarian cyst, torsion, pregnancy, ectopic pregnancy, PID, pelvic abscess, mittelschmerz, endometriosis, AAA, myocardial infarction, pneumonia, cancer, porphyria, DKA, medications, sickle cell, viral syndrome, zoster      ED Course:  ED Course as of 06/22/24 0212 Fri Jun 21, 2024 2212 I discussed the case with Dr. Medina and they agree to evaluate the patient at the bedside.    [CC]   2224 WBC(!): 12.88  Previously 11.08 [CC]   2232 HCG Qualitative: Negative [CC]   2236 Patient is feeling much better after GI cocktail.  Suspect her pain is from her duodenal ulcer. [CC]   2248 Lactate: 1.0 [CC]   2352 CT Abdomen Pelvis With Contrast  My independent interpretation of the CT abdomen pelvis is contracted gallbladder with no gallbladder wall thickening or gallstones noted.  No obvious bowel obstruction. [CC]   Sat Jun 22, 2024   0018 Amphet/Methamphet, Screen(!): Positive [CC]   0018 Benzodiazepine Screen, Urine(!): Positive [CC]   0018 Cocaine Screen, Urine(!): Positive [CC]   0018 Fentanyl, Urine(!): Positive [CC]   0018 Suspect her amphetamine and cocaine use may be contributing to her tachycardia. [CC]   0019 Heart Rate: 94 [CC]   0035 Cocaine Screen, Urine(!): Positive [MM]   0035 Benzodiazepine  Screen, Urine(!): Positive [MM]   0035 Amphet/Methamphet, Screen(!): Positive [MM]   0035 Fentanyl, Urine(!): Positive [MM]   0040 I reviewed the CT scan report from the radiologist she has a right ovarian cyst.  Otherwise no acute abnormality seen on the CT scan of the abdomen pelvis. [MM]   0040 Hemoglobin: 15.0  Patient's last hemoglobin was 11.83 months ago. [MM]   0041 Patient has not been 100% compliant with her medicines including [MM]   0043 Patient's polypharmacy ingestion of methamphetamines cocaine benzodiazepine and fentanyl are also factors that make it hard for her to heal from her previous peptic ulcer disease. [MM]   0046 My own independent or potation of the EKG at 953 reveals a rate of 120 was a sinus tachycardia very mild intravelar conduction delay with normal axis I did not appreciate any definitive acute injury pattern  QT looks normal  I compared to the previous EKG that was done on 12/13/2023 and it looked very similar. [MM]   0049 I rechecked the patient.  I discussed the patient's labs, radiology findings (including all incidental findings), diagnosis, and plan for discharge.  A repeat exam reveals no new worrisome changes from my initial exam findings.  The patient understands that the fact that they are being discharged does not denote that nothing is abnormal, it indicates that no clinical emergency is present and that they must follow-up as directed in order to properly maintain their health.  Follow-up instructions (specifically listed below) and return to ER precautions were given at this time.  I specifically instructed the patient to follow-up with their PCP.  The patient understands and agrees with the plan, and is ready for discharge.  All questions answered.   [CC]      ED Course User Index  [CC] Anna Marie Mabry PA-C  [MM] Dayton Medina MD           AS OF 02:12 EDT VITALS:    BP - 126/94  HR - 94  TEMP - 97.9 °F (36.6 °C) (Tympanic)  O2 SATS - 100%          COMPLEXITY OF  CARE  Admission was considered but after careful review of the patient's presentation, physical examination, diagnostic results, and response to treatment the patient may be safely discharged with outpatient follow-up.        DIAGNOSIS  Final diagnoses:   Epigastric pain   Duodenal ulcer   Polysubstance abuse         DISPOSITION  ED Disposition       ED Disposition   Discharge    Condition   Stable    Comment   --                      Please note that portions of this document were completed with a voice recognition program.    Note Disclaimer: At [facility], we believe that sharing information builds trust and better relationships. You are receiving this note because you recently visited [facility]. It is possible you will see health information before a provider has talked with you about it. This kind of information can be easy to misunderstand. To help you fully understand what it means for your health, we urge you to discuss this note with your provider.     Anna Marie Mabry PA-C  06/22/24 0213

## 2024-06-22 NOTE — ED PROVIDER NOTES
I supervised care provided by the midlevel provider.   We have discussed this patient's history, physical exam, and treatment plan.  I have reviewed the note and personally saw and examined the patient and agree with the plan of care.   Patient came in here complaining of periumbilical and midepigastric pain that became worse today.  She has a history of pain in this region history of peptic ulcer disease and is post to be on protein pump inhibitors.  She has complicating factors and the fact that she does have depression and PTSD and polysubstance abuse.  She admits to actively taking methamphetamine.  When I am seeing her she was treated with a GI cocktail and Pepcid and her pain has essentially 100% resolved and doing much better.  She does not want a thing more for pain.  She has been noncompliant with her medicines.  She does report some episodes of nausea but denies any vomiting.  Denies any black stool or blood in the stool.  Denies any fevers or chills or coughs or colds.  Denies any urinary symptoms.    GENERAL: not distressed.  On my exam her heart rate is right about 103.  Is a sinus tachycardia.  Blood pressure is unremarkable normal oxygen saturation she is afebrile.  HENT: nares patent  Head/neck/ face are symmetric without gross deformity or swelling  EYES: no scleral icterus  CV: regular rhythm, regular rate with intact distal pulses  RESPIRATORY: normal effort and no respiratory distress  ABDOMEN: soft and nontender normal bowel sounds.  Normal inspection  MUSCULOSKELETAL: no deformity  NEURO: alert and appropriate, moves all extremities, follows commands  SKIN: warm, dry    Vital signs and nursing notes reviewed.    Plan   ED Course as of 06/22/24 0158   Fri Jun 21, 2024 2212 I discussed the case with Dr. Medina and they agree to evaluate the patient at the bedside.    [CC]   2224 WBC(!): 12.88  Previously 11.08 [CC]   2232 HCG Qualitative: Negative [CC]   2236 Patient is feeling much better  after GI cocktail.  Suspect her pain is from her duodenal ulcer. [CC]   2248 Lactate: 1.0 [CC]   2352 CT Abdomen Pelvis With Contrast  My independent interpretation of the CT abdomen pelvis is contracted gallbladder with no gallbladder wall thickening or gallstones noted.  No obvious bowel obstruction. [CC]   Sat Jun 22, 2024   0018 Amphet/Methamphet, Screen(!): Positive [CC]   0018 Benzodiazepine Screen, Urine(!): Positive [CC]   0018 Cocaine Screen, Urine(!): Positive [CC]   0018 Fentanyl, Urine(!): Positive [CC]   0018 Suspect her amphetamine and cocaine use may be contributing to her tachycardia. [CC]   0019 Heart Rate: 94 [CC]   0035 Cocaine Screen, Urine(!): Positive [MM]   0035 Benzodiazepine Screen, Urine(!): Positive [MM]   0035 Amphet/Methamphet, Screen(!): Positive [MM]   0035 Fentanyl, Urine(!): Positive [MM]   0040 I reviewed the CT scan report from the radiologist she has a right ovarian cyst.  Otherwise no acute abnormality seen on the CT scan of the abdomen pelvis. [MM]   0040 Hemoglobin: 15.0  Patient's last hemoglobin was 11.83 months ago. [MM]   0041 Patient has not been 100% compliant with her medicines including [MM]   0043 Patient's polypharmacy ingestion of methamphetamines cocaine benzodiazepine and fentanyl are also factors that make it hard for her to heal from her previous peptic ulcer disease. [MM]   0046 My own independent or potation of the EKG at 953 reveals a rate of 120 was a sinus tachycardia very mild intravelar conduction delay with normal axis I did not appreciate any definitive acute injury pattern  QT looks normal  I compared to the previous EKG that was done on 12/13/2023 and it looked very similar. [MM]   0049 I rechecked the patient.  I discussed the patient's labs, radiology findings (including all incidental findings), diagnosis, and plan for discharge.  A repeat exam reveals no new worrisome changes from my initial exam findings.  The patient understands that the fact  that they are being discharged does not denote that nothing is abnormal, it indicates that no clinical emergency is present and that they must follow-up as directed in order to properly maintain their health.  Follow-up instructions (specifically listed below) and return to ER precautions were given at this time.  I specifically instructed the patient to follow-up with their PCP.  The patient understands and agrees with the plan, and is ready for discharge.  All questions answered.   [CC]      ED Course User Index  [CC] Anna Marie Mabry PA-C  [MM] Dayton Medina MD         CT scan of the abdomen pelvis is unremarkable I reviewed the report.  I reviewed the lab work as well.  She does usually have a chronically elevated white blood cell count.    I reevaluated the patient prior to leaving she wants to go home she is feeling better.  Did give her warning signs for return to the emergency department and strongly suggested that she stop using any illegal drugs.  All questions were answered.  She states that she will and does have the medicine for her ulcers that she will start back up on.       Dayton Medina MD  06/22/24 0158

## 2024-06-22 NOTE — DISCHARGE INSTRUCTIONS
Please follow-up with your PCP.    Rest.  Increase fluid intake.  Take Protonix as prescribed and follow-up with GI    Although you are being discharged in the ED today, I encouraged her to return for worsening symptoms.  Things can, and do, change such a treatment at home with medication may not be adequate.  Specifically I recommend returning for chest pain or discomfort, difficulty breathing, persistent vomiting or difficulty holding down liquids or medications, fever > 102.0 F,  or any other worsening or alarming symptoms.     You have been evaluated in the emergency department for your presenting symptoms and deemed appropriate for discharge home.  Understand that your health care does not end here today.  It is important that your primary care physician be made aware of your visit.  I recommend calling your primary care provider in the next 48 hours to arrange follow-up care.  Your primary care provider needs to review your treatment and recovery to ensure that no further treatment is necessary.  Additional testing or procedures may be necessary as an outpatient at the discretion of your primary care provider.    I also recommend following up with your PCP for recheck of your blood pressure and treatment as needed.

## 2024-06-23 LAB
QT INTERVAL: 311 MS
QTC INTERVAL: 440 MS

## 2024-10-10 ENCOUNTER — OFFICE VISIT (OUTPATIENT)
Dept: GASTROENTEROLOGY | Facility: CLINIC | Age: 34
End: 2024-10-10
Payer: COMMERCIAL

## 2024-10-10 VITALS
WEIGHT: 141.9 LBS | HEART RATE: 106 BPM | BODY MASS INDEX: 26.79 KG/M2 | DIASTOLIC BLOOD PRESSURE: 77 MMHG | TEMPERATURE: 98.4 F | HEIGHT: 61 IN | SYSTOLIC BLOOD PRESSURE: 114 MMHG

## 2024-10-10 DIAGNOSIS — K59.04 CHRONIC IDIOPATHIC CONSTIPATION: Primary | ICD-10-CM

## 2024-10-10 DIAGNOSIS — K92.1 MELENA: ICD-10-CM

## 2024-10-10 DIAGNOSIS — K29.90 GASTRITIS AND DUODENITIS: ICD-10-CM

## 2024-10-10 DIAGNOSIS — R11.14 BILIOUS VOMITING WITH NAUSEA: ICD-10-CM

## 2024-10-10 RX ORDER — PANTOPRAZOLE SODIUM 40 MG/1
40 TABLET, DELAYED RELEASE ORAL DAILY
Qty: 90 TABLET | Refills: 1 | Status: SHIPPED | OUTPATIENT
Start: 2024-10-10

## 2024-10-10 NOTE — PROGRESS NOTES
"Chief Complaint  Constipation, Abdominal Pain, and Nausea    Subjective          History of Present Illness    Karissa Roach is a  33 y.o. female presents for evaluation of constipation, abdominal pain, and nausea.  She will follow with Dr. Rice.  She is new to me.    From 6/22/2024 ED note: \"Patient is admitted to the hospital from 2/26/2024 to 3/1/2024 for right-sided abdominal pain and possible enteritis.  Patient was treated for bacterial intestinal infection with Zosyn and her chronic leukocytosis did normalize while in the hospital.  Patient was discharged on oral Augmentin and was to follow-up with GI outpatient.  Patient had endoscopy which showed duodenal ulceration and acute superficial gastritis.  She was prescribed a PPI upon discharge.  They attempted colonoscopy inpatient but bowel prep was inadequate...There was plan to reschedule outpatient.  The colonoscopy was intended to rule out inflammatory bowel disease.  She was also evaluated for right upper quadrant pain and her HIDA scan showed a mildly low EF but based on her presentation was thought unlikely for her gallbladder to be causing her pain.  They recommended she follow-up with surgery in 2 weeks for evaluation.  They recommended bowel regimen for constipation as they thought this was more likely the source of her pain.\"  -------------------------------------------------------------------------------------------------------------------  Today she reports black stools, constipation, nausea, vomiting. Small lumpy BM every couple days--black with specks of red blood.  She is only taking a stool softener daily at this point.  She has not taken any other medications at this time as she has not seen her healthcare providers recently. Failed miralax. Symptoms wax and wane in severity. Admits to bladder incontinence as well.     She went to the ED on 6/22/2024 with periumbilical abdominal pain and nausea.  Contrasted CT scan showed unremarkable other " "than right ovarian cyst.    6/20/24 labs showed normal CMP, lipase, and magnesium.  CBC with WBC 12.88-known chronic cytosis. UDS positive for amphetamine/methamphetamine, benzos, cocaine, and fentanyl (admits to relapse immediately prior to this).  Negative for THC.     2/29/2024 EGD showed mild gastritis, multiple duodenal erosions.  Pathology with gastritis and duodenitis, negative H. pylori and celiac disease.    Colonoscopy attempted twice while in the hospital and she was full of stool both times.    Medical history of endometriosis, anxiety, depression, PTSD, polysubstance abuse.  She quit smoking about 4 months ago.  Denies recent illicit drug use including marijuana.     Objective   Vital Signs:   /77   Pulse 106   Temp 98.4 °F (36.9 °C)   Ht 154.9 cm (61\")   Wt 64.4 kg (141 lb 14.4 oz)   BMI 26.81 kg/m²       Physical Exam  Vitals reviewed.   Constitutional:       General: She is awake. She is not in acute distress.     Appearance: Normal appearance. She is well-developed and well-groomed.   HENT:      Head: Normocephalic.   Pulmonary:      Effort: Pulmonary effort is normal. No respiratory distress.   Abdominal:      General: Bowel sounds are normal. There is distension.      Palpations: Abdomen is soft. There is no hepatomegaly or mass.      Tenderness: There is generalized abdominal tenderness. There is guarding. There is no rebound.      Comments: Worse in RUQ and LLQ   Skin:     Coloration: Skin is not pale.   Neurological:      Mental Status: She is alert and oriented to person, place, and time.      Gait: Gait is intact.   Psychiatric:         Mood and Affect: Mood and affect normal.         Speech: Speech normal.         Behavior: Behavior is cooperative.         Judgment: Judgment normal.          Result Review :             Assessment and Plan    Diagnoses and all orders for this visit:    1. Chronic idiopathic constipation (Primary)    2. Bilious vomiting with nausea    3. " Melena    4. Gastritis and duodenitis    Other orders  -     pantoprazole (PROTONIX) 40 MG EC tablet; Take 1 tablet by mouth Daily.  Dispense: 90 tablet; Refill: 1  -     linaclotide (Linzess) 145 MCG capsule capsule; Take 1 capsule by mouth Every Morning Before Breakfast.  Dispense: 16 capsule; Refill: 0    Recommend aggressive bowel regimen given ongoing symptoms as well as 2 colonoscopies attempted in hospital with significant stool burden. Increase fluid, fiber, and activity.     Discussed that this may be the source of her nausea and vomiting so important to aggressively treat.      Recommend she restart pantoprazole 40 mg daily given findings of gastritis and duodenitis on EGD previously.    Follow Up   Return in about 4 weeks (around 11/7/2024).    Dragon dictation used throughout this note.     Sahra Mari PA-C

## 2024-10-11 ENCOUNTER — TELEPHONE (OUTPATIENT)
Dept: GASTROENTEROLOGY | Facility: CLINIC | Age: 34
End: 2024-10-11
Payer: COMMERCIAL

## 2024-10-11 NOTE — TELEPHONE ENCOUNTER
I am not sure what she was on the hospital.  Maybe it was dicyclomine which is an antispasmodic and can help abdominal pain.  With that said, I really do not want her to take that as it will make her constipation worse.  There is not much I can give her for pain that will not make her constipation worse and fight against the bowel prep.  With that said she is just going to have to push through.  She can take peppermint or IBgard over-the-counter which can help ease intestinal spasms.

## 2024-10-11 NOTE — TELEPHONE ENCOUNTER
"Hub staff attempted to follow warm transfer process and was unsuccessful     Caller: Karissa Roach    Relationship to patient: Self    Best call back number: [phone]    Patient is needing: PATIENT CALLED IN AND STATED THAT WHILE AT HER OFFICE VISIT YESTERDAY (10/10/2024) SHE WAS TOLD TO START HER PREP MEDICATION FOR UPCOMING COLONOSCOPY, BUT PATIENT STATES THAT THE PREP IS MAKING HER STOMACH CRAMP REALLY BAD AND SHE WOULD LIKE TO KNOW IF SHE COULD BE PRESCRIBED SOMETHING FOR HER PAIN DURING THE PREP. PATIENT STATED THAT \"WHATEVER MEDICATION IT WAS THAT SHE WAS ON IN THE HOSPITAL IS THE BEST\", BUT SHE DOESN'T REMEMBER WHAT IT WAS. PATIENT STATES TO USE [facility] ON FILE. PLEASE CALL BACK ANYTIME, OKAY TO LEAVE .       "

## 2024-10-14 NOTE — TELEPHONE ENCOUNTER
Patient called. Advised as per sahra's note. She states she is now running a low grade fever. Last night her temp was 99.3 and this morning its 99.2. reports she had a diarrhea stool yesterday morning. Reports she has bloat and continues with the cramping. She taking her Linzess 145 mcg every morning.     Update to Sahra.

## 2024-10-15 ENCOUNTER — TELEPHONE (OUTPATIENT)
Dept: GASTROENTEROLOGY | Facility: CLINIC | Age: 34
End: 2024-10-15
Payer: COMMERCIAL

## 2024-10-15 ENCOUNTER — TELEPHONE (OUTPATIENT)
Dept: GASTROENTEROLOGY | Facility: CLINIC | Age: 34
End: 2024-10-15

## 2024-10-15 ENCOUNTER — APPOINTMENT (OUTPATIENT)
Dept: CT IMAGING | Facility: HOSPITAL | Age: 34
End: 2024-10-15
Payer: COMMERCIAL

## 2024-10-15 ENCOUNTER — HOSPITAL ENCOUNTER (EMERGENCY)
Facility: HOSPITAL | Age: 34
Discharge: HOME | End: 2024-10-15
Attending: EMERGENCY MEDICINE | Admitting: EMERGENCY MEDICINE
Payer: COMMERCIAL

## 2024-10-15 VITALS
DIASTOLIC BLOOD PRESSURE: 67 MMHG | RESPIRATION RATE: 16 BRPM | WEIGHT: 140 LBS | HEIGHT: 62 IN | OXYGEN SATURATION: 99 % | SYSTOLIC BLOOD PRESSURE: 110 MMHG | BODY MASS INDEX: 25.76 KG/M2 | HEART RATE: 86 BPM | TEMPERATURE: 98 F

## 2024-10-15 DIAGNOSIS — R19.7 DIARRHEA, UNSPECIFIED TYPE: ICD-10-CM

## 2024-10-15 DIAGNOSIS — R10.9 RIGHT SIDED ABDOMINAL PAIN: Primary | ICD-10-CM

## 2024-10-15 DIAGNOSIS — R73.9 HYPERGLYCEMIA: ICD-10-CM

## 2024-10-15 DIAGNOSIS — R11.2 NAUSEA AND VOMITING, UNSPECIFIED VOMITING TYPE: ICD-10-CM

## 2024-10-15 LAB
ADV 40+41 DNA STL QL NAA+NON-PROBE: NOT DETECTED
ALBUMIN SERPL-MCNC: 4 G/DL (ref 3.5–5.2)
ALBUMIN/GLOB SERPL: 1.7 G/DL
ALP SERPL-CCNC: 45 U/L (ref 39–117)
ALT SERPL W P-5'-P-CCNC: 11 U/L (ref 1–33)
AMPHET+METHAMPHET UR QL: NEGATIVE
ANION GAP SERPL CALCULATED.3IONS-SCNC: 8.9 MMOL/L (ref 5–15)
AST SERPL-CCNC: 15 U/L (ref 1–32)
ASTRO TYP 1-8 RNA STL QL NAA+NON-PROBE: NOT DETECTED
BACTERIA UR QL AUTO: ABNORMAL /HPF
BARBITURATES UR QL SCN: NEGATIVE
BASOPHILS # BLD AUTO: 0.03 10*3/MM3 (ref 0–0.2)
BASOPHILS NFR BLD AUTO: 0.3 % (ref 0–1.5)
BENZODIAZ UR QL SCN: NEGATIVE
BILIRUB SERPL-MCNC: 0.4 MG/DL (ref 0–1.2)
BILIRUB UR QL STRIP: NEGATIVE
BUN SERPL-MCNC: 9 MG/DL (ref 6–20)
BUN/CREAT SERPL: 13 (ref 7–25)
C CAYETANENSIS DNA STL QL NAA+NON-PROBE: NOT DETECTED
C COLI+JEJ+UPSA DNA STL QL NAA+NON-PROBE: NOT DETECTED
CALCIUM SPEC-SCNC: 8.9 MG/DL (ref 8.6–10.5)
CANNABINOIDS SERPL QL: NEGATIVE
CHLORIDE SERPL-SCNC: 104 MMOL/L (ref 98–107)
CLARITY UR: ABNORMAL
CO2 SERPL-SCNC: 23.1 MMOL/L (ref 22–29)
COCAINE UR QL: NEGATIVE
COLOR UR: YELLOW
CREAT SERPL-MCNC: 0.69 MG/DL (ref 0.57–1)
CRYPTOSP DNA STL QL NAA+NON-PROBE: NOT DETECTED
DEPRECATED RDW RBC AUTO: 42.4 FL (ref 37–54)
E HISTOLYT DNA STL QL NAA+NON-PROBE: NOT DETECTED
EAEC PAA PLAS AGGR+AATA ST NAA+NON-PRB: NOT DETECTED
EC STX1+STX2 GENES STL QL NAA+NON-PROBE: NOT DETECTED
EGFRCR SERPLBLD CKD-EPI 2021: 118.4 ML/MIN/1.73
EOSINOPHIL # BLD AUTO: 0.08 10*3/MM3 (ref 0–0.4)
EOSINOPHIL NFR BLD AUTO: 0.8 % (ref 0.3–6.2)
EPEC EAE GENE STL QL NAA+NON-PROBE: NOT DETECTED
ERYTHROCYTE [DISTWIDTH] IN BLOOD BY AUTOMATED COUNT: 12.4 % (ref 12.3–15.4)
ETEC LTA+ST1A+ST1B TOX ST NAA+NON-PROBE: NOT DETECTED
FENTANYL UR-MCNC: NEGATIVE NG/ML
G LAMBLIA DNA STL QL NAA+NON-PROBE: NOT DETECTED
GLOBULIN UR ELPH-MCNC: 2.3 GM/DL
GLUCOSE SERPL-MCNC: 114 MG/DL (ref 65–99)
GLUCOSE UR STRIP-MCNC: NEGATIVE MG/DL
HCG SERPL QL: NEGATIVE
HCT VFR BLD AUTO: 39.3 % (ref 34–46.6)
HGB BLD-MCNC: 13.5 G/DL (ref 12–15.9)
HGB UR QL STRIP.AUTO: NEGATIVE
HYALINE CASTS UR QL AUTO: ABNORMAL /LPF
IMM GRANULOCYTES # BLD AUTO: 0.02 10*3/MM3 (ref 0–0.05)
IMM GRANULOCYTES NFR BLD AUTO: 0.2 % (ref 0–0.5)
KETONES UR QL STRIP: NEGATIVE
LEUKOCYTE ESTERASE UR QL STRIP.AUTO: ABNORMAL
LIPASE SERPL-CCNC: 9 U/L (ref 13–60)
LYMPHOCYTES # BLD AUTO: 3.24 10*3/MM3 (ref 0.7–3.1)
LYMPHOCYTES NFR BLD AUTO: 31.1 % (ref 19.6–45.3)
MCH RBC QN AUTO: 32.5 PG (ref 26.6–33)
MCHC RBC AUTO-ENTMCNC: 34.4 G/DL (ref 31.5–35.7)
MCV RBC AUTO: 94.7 FL (ref 79–97)
METHADONE UR QL SCN: NEGATIVE
MONOCYTES # BLD AUTO: 0.85 10*3/MM3 (ref 0.1–0.9)
MONOCYTES NFR BLD AUTO: 8.2 % (ref 5–12)
NEUTROPHILS NFR BLD AUTO: 59.4 % (ref 42.7–76)
NEUTROPHILS NFR BLD AUTO: 6.2 10*3/MM3 (ref 1.7–7)
NITRITE UR QL STRIP: NEGATIVE
NOROVIRUS GI+II RNA STL QL NAA+NON-PROBE: NOT DETECTED
NRBC BLD AUTO-RTO: 0 /100 WBC (ref 0–0.2)
OPIATES UR QL: NEGATIVE
OXYCODONE UR QL SCN: NEGATIVE
P SHIGELLOIDES DNA STL QL NAA+NON-PROBE: NOT DETECTED
PH UR STRIP.AUTO: 6 [PH] (ref 5–8)
PLATELET # BLD AUTO: 305 10*3/MM3 (ref 140–450)
PMV BLD AUTO: 9 FL (ref 6–12)
POTASSIUM SERPL-SCNC: 3.5 MMOL/L (ref 3.5–5.2)
PROT SERPL-MCNC: 6.3 G/DL (ref 6–8.5)
PROT UR QL STRIP: NEGATIVE
RBC # BLD AUTO: 4.15 10*6/MM3 (ref 3.77–5.28)
RBC # UR STRIP: ABNORMAL /HPF
REF LAB TEST METHOD: ABNORMAL
RVA RNA STL QL NAA+NON-PROBE: NOT DETECTED
S ENT+BONG DNA STL QL NAA+NON-PROBE: NOT DETECTED
SAPO I+II+IV+V RNA STL QL NAA+NON-PROBE: NOT DETECTED
SHIGELLA SP+EIEC IPAH ST NAA+NON-PROBE: NOT DETECTED
SODIUM SERPL-SCNC: 136 MMOL/L (ref 136–145)
SP GR UR STRIP: <=1.005 (ref 1–1.03)
SQUAMOUS #/AREA URNS HPF: ABNORMAL /HPF
UROBILINOGEN UR QL STRIP: ABNORMAL
V CHOL+PARA+VUL DNA STL QL NAA+NON-PROBE: NOT DETECTED
V CHOLERAE DNA STL QL NAA+NON-PROBE: NOT DETECTED
WBC # UR STRIP: ABNORMAL /HPF
WBC NRBC COR # BLD AUTO: 10.42 10*3/MM3 (ref 3.4–10.8)
Y ENTEROCOL DNA STL QL NAA+NON-PROBE: NOT DETECTED

## 2024-10-15 PROCEDURE — 80307 DRUG TEST PRSMV CHEM ANLYZR: CPT | Performed by: EMERGENCY MEDICINE

## 2024-10-15 PROCEDURE — 99285 EMERGENCY DEPT VISIT HI MDM: CPT

## 2024-10-15 PROCEDURE — 74177 CT ABD & PELVIS W/CONTRAST: CPT

## 2024-10-15 PROCEDURE — 25010000002 ONDANSETRON PER 1 MG: Performed by: EMERGENCY MEDICINE

## 2024-10-15 PROCEDURE — 80053 COMPREHEN METABOLIC PANEL: CPT | Performed by: EMERGENCY MEDICINE

## 2024-10-15 PROCEDURE — 84703 CHORIONIC GONADOTROPIN ASSAY: CPT | Performed by: EMERGENCY MEDICINE

## 2024-10-15 PROCEDURE — 81001 URINALYSIS AUTO W/SCOPE: CPT | Performed by: EMERGENCY MEDICINE

## 2024-10-15 PROCEDURE — 87507 IADNA-DNA/RNA PROBE TQ 12-25: CPT | Performed by: EMERGENCY MEDICINE

## 2024-10-15 PROCEDURE — 96374 THER/PROPH/DIAG INJ IV PUSH: CPT

## 2024-10-15 PROCEDURE — 96375 TX/PRO/DX INJ NEW DRUG ADDON: CPT

## 2024-10-15 PROCEDURE — 85025 COMPLETE CBC W/AUTO DIFF WBC: CPT | Performed by: EMERGENCY MEDICINE

## 2024-10-15 PROCEDURE — 83690 ASSAY OF LIPASE: CPT | Performed by: EMERGENCY MEDICINE

## 2024-10-15 PROCEDURE — 25510000001 IOPAMIDOL 61 % SOLUTION: Performed by: EMERGENCY MEDICINE

## 2024-10-15 RX ORDER — IOPAMIDOL 612 MG/ML
100 INJECTION, SOLUTION INTRAVASCULAR
Status: COMPLETED | OUTPATIENT
Start: 2024-10-15 | End: 2024-10-15

## 2024-10-15 RX ORDER — ONDANSETRON 8 MG/1
8 TABLET, ORALLY DISINTEGRATING ORAL EVERY 8 HOURS PRN
Qty: 15 TABLET | Refills: 0 | Status: SHIPPED | OUTPATIENT
Start: 2024-10-15

## 2024-10-15 RX ORDER — SUCRALFATE 1 G/1
1 TABLET ORAL 4 TIMES DAILY
Qty: 20 TABLET | Refills: 0 | Status: SHIPPED | OUTPATIENT
Start: 2024-10-15

## 2024-10-15 RX ORDER — FAMOTIDINE 10 MG/ML
20 INJECTION, SOLUTION INTRAVENOUS ONCE
Status: COMPLETED | OUTPATIENT
Start: 2024-10-15 | End: 2024-10-15

## 2024-10-15 RX ORDER — ONDANSETRON 2 MG/ML
8 INJECTION INTRAMUSCULAR; INTRAVENOUS ONCE
Status: COMPLETED | OUTPATIENT
Start: 2024-10-15 | End: 2024-10-15

## 2024-10-15 RX ORDER — ONDANSETRON 2 MG/ML
8 INJECTION INTRAMUSCULAR; INTRAVENOUS ONCE
Status: DISCONTINUED | OUTPATIENT
Start: 2024-10-15 | End: 2024-10-15

## 2024-10-15 RX ORDER — MORPHINE SULFATE 2 MG/ML
4 INJECTION, SOLUTION INTRAMUSCULAR; INTRAVENOUS ONCE
Status: DISCONTINUED | OUTPATIENT
Start: 2024-10-15 | End: 2024-10-15

## 2024-10-15 RX ADMIN — ONDANSETRON 8 MG: 2 INJECTION, SOLUTION INTRAMUSCULAR; INTRAVENOUS at 18:32

## 2024-10-15 RX ADMIN — FAMOTIDINE 20 MG: 10 INJECTION INTRAVENOUS at 18:32

## 2024-10-15 RX ADMIN — IOPAMIDOL 85 ML: 612 INJECTION, SOLUTION INTRAVENOUS at 19:42

## 2024-10-15 NOTE — TELEPHONE ENCOUNTER
Caller: BLAYNE SALAZAR     Relationship: SELF    Best call back number:[phone]     Requested Prescriptions: KIT FOR COLONOSCOPY   Requested Prescriptions      No prescriptions requested or ordered in this encounter        Pharmacy where request should be sent:      Last office visit with prescribing clinician: Visit date not found   Last telemedicine visit with prescribing clinician: Visit date not found   Next office visit with prescribing clinician: Visit date not found     Additional details provided by patient: PT SPILLED KIT ON FLOOR A AND B     Does the patient have less than a 3 day supply:  [x] Yes  [] No    Would you like a call back once the refill request has been completed: [x] Yes [] No    If the office needs to give you a call back, can they leave a voicemail: [x] Yes [] No    Mary Jane Alcantara Rep   10/15/24 10:35 EDT

## 2024-10-15 NOTE — TELEPHONE ENCOUNTER
Hub staff attempted to follow warm transfer process and was unsuccessful     Caller: BLAYNE SALAZAR     Relationship to patient: SELF    Best call back number: [phone]     Patient is needing: FEVER IS UP TO 100F PLEASE ADVISE FROM YESTERDAYS MESSAGE AND CALL PT BACK

## 2024-10-15 NOTE — ED PROVIDER NOTES
EMERGENCY DEPARTMENT ENCOUNTER  Room Number:  12/12  Date of encounter:  10/15/2024  PCP: Konstantin Jeffries APRN  Patient Care Team:  Konstantin Jeffries APRN as PCP - General (Family Medicine)     HPI:  Context: Karissa Roach is a 33 y.o. female who presents to the ED c/o chief complaint of abdominal pain with nausea vomiting and diarrhea.  Patient reports that she has been having right-sided abdominal pain for the last week or 2, pain in right lower quadrant is described as a cramp, patient having sharp pain in right upper quadrant, pain occasionally radiating to the back.  Patient reports nausea vomiting, no emesis today.  Patient has been able in diarrhea, unable to quantify, denies any blood, does report mucus.  Patient endorses urinary frequency and urgency.  Patient reports low-grade fevers with Tmax of 99.    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    Cocaine abuse 04/24/2023    Nicotine dependence 05/09/2014    Psychoactive substance dependence 04/24/2023    Sedative, hypnotic or anxiolytic-related disorder 04/24/2023    Opioid abuse 04/24/2023    Acute distention of stomach 12/11/2023    Acute gastroenteritis 12/12/2023    Enteritis 12/13/2023    Abdominal pain, acute, right upper quadrant 02/26/2024    Amphetamine positive UDS 02/27/2024    Possible Bacterial enteritis 02/29/2024    Acute superficial gastritis 02/29/2024    Duodenal ulceration 02/29/2024     Resolved Ambulatory Problems     Diagnosis Date Noted    Chronic Leukocytosis 12/10/2023    Acute cystitis without hematuria 12/12/2023    Constipation 02/26/2024     Past Medical History:   Diagnosis Date    Anemia        PAST SURGICAL HISTORY  Past Surgical History:   Procedure Laterality  Date    ABDOMINAL SURGERY      COLONOSCOPY N/A 2/29/2024    Procedure: COLONOSCOPY TO 2OCM (ABORTED DUE TO PREP);  Surgeon: Fili Rice MD;  Location: [facility] ENDOSCOPY;  Service: Gastroenterology;  Laterality: N/A;  PRE- ABD PAIN  POST- POOR PREP    COLONOSCOPY N/A 3/1/2024    Procedure: COLONOSCOPY to 55cm;  Surgeon: Félix Overton MD;  Location: [facility] ENDOSCOPY;  Service: Gastroenterology;  Laterality: N/A;  pre: abd pain  post: poor prep    D & C WITH SUCTION N/A 9/20/2018    Procedure: DILATATION AND CURETTAGE WITH SUCTION;  Surgeon: Jose Mobley MD;  Location: [facility] MAIN OR;  Service: Obstetrics/Gynecology    ENDOSCOPY N/A 2/29/2024    Procedure: ESOPHAGOGASTRODUODENOSCOPY WITH COLD BIOPSIES;  Surgeon: Fili Rice MD;  Location: [facility] ENDOSCOPY;  Service: Gastroenterology;  Laterality: N/A;  PRE- ABD PAIN  POST- GASTRITIS, DUODENAL EROSIONS    OTHER SURGICAL HISTORY      removal of endometriosis in 2014       FAMILY HISTORY  Family History   Problem Relation Age of Onset    Malig Hyperthermia Neg Hx        SOCIAL HISTORY  Social History     Socioeconomic History    Marital status: Single   Tobacco Use    Smoking status: Former     Current packs/day: 1.00     Types: Cigarettes    Smokeless tobacco: Never   Vaping Use    Vaping status: Never Used   Substance and Sexual Activity    Alcohol use: Not Currently     Comment: sober    Drug use: Not Currently     Comment: opiates, came from [facility] recovery , last use 12 years ago, states she has taken xanax 10 days ago    Sexual activity: Defer       ALLERGIES  Inapsine [droperidol]    The patient's allergies have been reviewed    REVIEW OF SYSTEMS  All systems reviewed and negative except for those discussed in HPI.     PHYSICAL EXAM  I have reviewed the triage vital signs and nursing notes.  ED Triage Vitals [10/15/24 1756]   Temp Heart Rate Resp BP SpO2   98 °F (36.7 °C) 110 16 -- 100 %      Temp src Heart Rate Source Patient Position BP Location  FiO2 (%)   Tympanic -- -- -- --       General: No acute distress.  HENT: NCAT, PERRL, Nares patent.  Eyes: no scleral icterus.  Neck: trachea midline, no ROM limitations.  CV: regular rhythm, regular rate.  Respiratory: normal effort, CTAB.  Abdomen: soft, nondistended, Generalized abdominal tenderness, greater on the right-hand side, negative Mari's, negative McBurney's, no rebound tenderness, no guarding or rigidity.  Musculoskeletal: no deformity.  Neuro: alert, moves all extremities, follows commands.  Skin: warm, dry.    LAB RESULTS  Recent Results (from the past 24 hours)   Comprehensive Metabolic Panel    Collection Time: 10/15/24  6:22 PM    Specimen: Blood   Result Value Ref Range    Glucose 114 (H) 65 - 99 mg/dL    BUN 9 6 - 20 mg/dL    Creatinine 0.69 0.57 - 1.00 mg/dL    Sodium 136 136 - 145 mmol/L    Potassium 3.5 3.5 - 5.2 mmol/L    Chloride 104 98 - 107 mmol/L    CO2 23.1 22.0 - 29.0 mmol/L    Calcium 8.9 8.6 - 10.5 mg/dL    Total Protein 6.3 6.0 - 8.5 g/dL    Albumin 4.0 3.5 - 5.2 g/dL    ALT (SGPT) 11 1 - 33 U/L    AST (SGOT) 15 1 - 32 U/L    Alkaline Phosphatase 45 39 - 117 U/L    Total Bilirubin 0.4 0.0 - 1.2 mg/dL    Globulin 2.3 gm/dL    A/G Ratio 1.7 g/dL    BUN/Creatinine Ratio 13.0 7.0 - 25.0    Anion Gap 8.9 5.0 - 15.0 mmol/L    eGFR 118.4 >60.0 mL/min/1.73   Lipase    Collection Time: 10/15/24  6:22 PM    Specimen: Blood   Result Value Ref Range    Lipase 9 (L) 13 - 60 U/L   hCG, Serum, Qualitative    Collection Time: 10/15/24  6:22 PM    Specimen: Blood   Result Value Ref Range    HCG Qualitative Negative Negative   CBC Auto Differential    Collection Time: 10/15/24  6:22 PM    Specimen: Blood   Result Value Ref Range    WBC 10.42 3.40 - 10.80 10*3/mm3    RBC 4.15 3.77 - 5.28 10*6/mm3    Hemoglobin 13.5 12.0 - 15.9 g/dL    Hematocrit 39.3 34.0 - 46.6 %    MCV 94.7 79.0 - 97.0 fL    MCH 32.5 26.6 - 33.0 pg    MCHC 34.4 31.5 - 35.7 g/dL    RDW 12.4 12.3 - 15.4 %    RDW-SD 42.4 37.0 - 54.0  fl    MPV 9.0 6.0 - 12.0 fL    Platelets 305 140 - 450 10*3/mm3    Neutrophil % 59.4 42.7 - 76.0 %    Lymphocyte % 31.1 19.6 - 45.3 %    Monocyte % 8.2 5.0 - 12.0 %    Eosinophil % 0.8 0.3 - 6.2 %    Basophil % 0.3 0.0 - 1.5 %    Immature Grans % 0.2 0.0 - 0.5 %    Neutrophils, Absolute 6.20 1.70 - 7.00 10*3/mm3    Lymphocytes, Absolute 3.24 (H) 0.70 - 3.10 10*3/mm3    Monocytes, Absolute 0.85 0.10 - 0.90 10*3/mm3    Eosinophils, Absolute 0.08 0.00 - 0.40 10*3/mm3    Basophils, Absolute 0.03 0.00 - 0.20 10*3/mm3    Immature Grans, Absolute 0.02 0.00 - 0.05 10*3/mm3    nRBC 0.0 0.0 - 0.2 /100 WBC   Urinalysis With Microscopic If Indicated (No Culture) - Urine, Clean Catch    Collection Time: 10/15/24  6:23 PM    Specimen: Urine, Clean Catch   Result Value Ref Range    Color, UA Yellow Yellow, Straw    Appearance, UA Cloudy (A) Clear    pH, UA 6.0 5.0 - 8.0    Specific Gravity, UA <=1.005 1.005 - 1.030    Glucose, UA Negative Negative    Ketones, UA Negative Negative    Bilirubin, UA Negative Negative    Blood, UA Negative Negative    Protein, UA Negative Negative    Leuk Esterase, UA Large (3+) (A) Negative    Nitrite, UA Negative Negative    Urobilinogen, UA 0.2 E.U./dL 0.2 - 1.0 E.U./dL   Urine Drug Screen - Urine, Clean Catch    Collection Time: 10/15/24  6:23 PM    Specimen: Urine, Clean Catch   Result Value Ref Range    Amphet/Methamphet, Screen Negative Negative    Barbiturates Screen, Urine Negative Negative    Benzodiazepine Screen, Urine Negative Negative    Cocaine Screen, Urine Negative Negative    Opiate Screen Negative Negative    THC, Screen, Urine Negative Negative    Methadone Screen, Urine Negative Negative    Oxycodone Screen, Urine Negative Negative    Fentanyl, Urine Negative Negative   Urinalysis, Microscopic Only - Urine, Clean Catch    Collection Time: 10/15/24  6:23 PM    Specimen: Urine, Clean Catch   Result Value Ref Range    RBC, UA 0-2 None Seen, 0-2 /HPF    WBC, UA 21-50 (A) None Seen,  0-2 /HPF    Bacteria, UA None Seen None Seen /HPF    Squamous Epithelial Cells, UA 7-12 (A) None Seen, 0-2 /HPF    Hyaline Casts, UA None Seen None Seen /LPF    Methodology Automated Microscopy    Gastrointestinal Panel, PCR - Stool, Per Rectum    Collection Time: 10/15/24  6:49 PM    Specimen: Per Rectum; Stool   Result Value Ref Range    Campylobacter Not Detected Not Detected    Plesiomonas shigelloides Not Detected Not Detected    Salmonella Not Detected Not Detected    Vibrio Not Detected Not Detected    Vibrio cholerae Not Detected Not Detected    Yersinia enterocolitica Not Detected Not Detected    Enteroaggregative E. coli (EAEC) Not Detected Not Detected    Enteropathogenic E. coli (EPEC) Not Detected Not Detected    Enterotoxigenic E. coli (ETEC) lt/st Not Detected Not Detected    Shiga-like toxin-producing E. coli (STEC) stx1/stx2 Not Detected Not Detected    Shigella/Enteroinvasive E. coli (EIEC) Not Detected Not Detected    Cryptosporidium Not Detected Not Detected    Cyclospora cayetanensis Not Detected Not Detected    Entamoeba histolytica Not Detected Not Detected    Giardia lamblia Not Detected Not Detected    Adenovirus F40/41 Not Detected Not Detected    Astrovirus Not Detected Not Detected    Norovirus GI/GII Not Detected Not Detected    Rotavirus A Not Detected Not Detected    Sapovirus (I, II, IV or V) Not Detected Not Detected       I ordered the above labs and reviewed the results.    RADIOLOGY  CT Abdomen Pelvis With Contrast    Result Date: 10/15/2024  CT ABDOMEN AND PELVIS WITH IV CONTRAST  HISTORY: 33-year-old female with right-sided abdominal pain. Nausea, vomiting, diarrhea.  TECHNIQUE: Radiation dose reduction techniques were utilized, including automated exposure control and exposure modulation based on body size. 3 mm images were obtained through the abdomen and pelvis after the administration of IV contrast. Compared with previous CT 06/21/2024.  FINDINGS: 1. The stomach contains a  small volume of food debris and fluid, but is otherwise collapsed. There is a new thickened appearance of the 2nd and 3rd portions of the duodenum. Query duodenitis. Please correlate clinically and follow-up as needed.  2. Small bowel appears unremarkable. There is formed stool throughout most of the colon. There is no colitis and no evidence for appendicitis.  3. The liver, gallbladder, spleen, pancreas, adrenals, and kidneys appear unremarkable. Urinary bladder is collapsed. Uterus and right ovary appear unremarkable. There is a 1.5 cm left ovarian follicle.  4. No evidence for pneumonia or pleural effusions at the visualized lower chest.       I ordered the above noted radiological studies. I reviewed the images and results. I agree with the radiologist interpretation.    PROCEDURES  Procedures    MEDICATIONS GIVEN IN ER  Medications   ondansetron (ZOFRAN) injection 8 mg (8 mg Intravenous Given 10/15/24 1832)   famotidine (PEPCID) injection 20 mg (20 mg Intravenous Given 10/15/24 1832)   iopamidol (ISOVUE-300) 61 % injection 100 mL (85 mL Intravenous Given by Other 10/15/24 1942)       PROGRESS, DATA ANALYSIS, CONSULTS, AND MEDICAL DECISION MAKING  A complete history and physical exam have been performed.  All available laboratory and imaging results have been reviewed by myself prior to disposition.    MDM    After the initial H&P, I discussed pertinent information from history and physical exam with patient/family.  Discussed differential diagnosis.  Discussed plan for ED evaluation/workup/treatment.  All questions answered.  Patient/family is agreeable with plan.  ED Course as of 10/15/24 2106   e Oct 15, 2024   1800 My differential diagnosis for abdominal pain includes but is not limited to:  Gastritis, gastroenteritis, peptic ulcer disease, GERD, esophageal perforation, acute appendicitis, mesenteric adenitis, Meckel's diverticulum, epiploic appendagitis, diverticulitis, colon cancer, ulcerative colitis,  Crohn's disease, intussusception, small bowel obstruction, adhesions, ischemic bowel, perforated viscus, ileus, obstipation, biliary colic, cholecystitis, cholelithiasis, John-Rick Oscar, hepatitis, pancreatitis, common bile duct obstruction, cholangitis, bile leak, splenic trauma, splenic rupture, splenic infarction, splenic abscess, abdominal abscess, ascites, spontaneous bacterial peritonitis, hernia, UTI, cystitis, prostatitis, ureterolithiasis, urinary obstruction, ovarian cyst, torsion, pregnancy, ectopic pregnancy, PID, pelvic abscess, mittelschmerz, endometriosis, AAA, myocardial infarction, pneumonia, cancer, porphyria, DKA, medications, sickle cell, viral syndrome, zoster   [JG]   1945 Patient afebrile, initially tachycardic, tachycardia resolved.  Patient has no leukocytosis or left shift, liver enzymes bilirubin and lipase are normal.  Urinalysis shows no sign of urinary tract infection. [JG]   2103 I reviewed CT abdomen pelvis in PACS, no appendicitis per my read. [JG]   2103 Stool studies negative.  CT imaging unremarkable other than questionable duodenitis. [JG]   2103 Patient reassessed, discussed ED workup and results.  Discussed plan for discharge, prescription for acid medications and nausea medication, discussed Imodium as needed for diarrhea, discussed need for follow-up with primary care, discussed referral to GI.  Given extensive discussion return precautions, discharging. [JG]      ED Course User Index  [JG] George Ramachandran MD       AS OF 21:06 EDT VITALS:    BP - 110/67  HR - 86  TEMP - 98 °F (36.7 °C) (Tympanic)  O2 SATS - 99%    DIAGNOSIS  Final diagnoses:   Right sided abdominal pain   Nausea and vomiting, unspecified vomiting type   Diarrhea, unspecified type   Hyperglycemia         DISPOSITION  DISCHARGE    Patient discharged in stable condition.    Reviewed implications of results, diagnosis, meds, responsibility to follow up, warning signs and symptoms of possible worsening,  potential complications and reasons to return to ER.    Patient/Family voiced understanding of above instructions.    Discussed plan for discharge, as there is no emergent indication for admission. Patient referred to primary care provider for BP management due to today's BP. Pt/family is agreeable and understands need for follow up and repeat testing.  Pt is aware that discharge does not mean that nothing is wrong but it indicates no emergency is present that requires admission and they must continue care with follow-up as given below or physician of their choice.     FOLLOW-UP  Konstantin Jeffries, APRN  [address] [facility] [id number] [phone]    Schedule an appointment as soon as possible for a visit in 2 days  even if well    [facility] GASTROENTEROLOGY  [facility]  [address]  [place] [address] [phone]  Schedule an appointment as soon as possible for a visit in 2 days           Medication List        New Prescriptions      ondansetron ODT 8 MG disintegrating tablet  Commonly known as: ZOFRAN-ODT  Place 1 tablet on the tongue Every 8 (Eight) Hours As Needed for Nausea or Vomiting.     sucralfate 1 g tablet  Commonly known as: CARAFATE  Take 1 tablet by mouth 4 (Four) Times a Day.               Where to Get Your Medications        These medications were sent to [facility]  [address] [id number]      Hours: Monday to Friday 7 AM to 6 PM, Saturday & Sunday 8 AM to 4:30 PM (Closed 12 PM to 12:30 PM) Phone: [phone]   ondansetron ODT 8 MG disintegrating tablet  sucralfate 1 g tablet            George Ramachandran MD  10/15/24 0940

## 2024-10-15 NOTE — TELEPHONE ENCOUNTER
Hub staff attempted to follow warm transfer process and was unsuccessful     Caller: Karissa Roach    Relationship to patient: Self    Best call back number: [phone]    Patient is needing: PT RETURNING MISSED CALL

## 2024-10-15 NOTE — TELEPHONE ENCOUNTER
She was given sample prep at office visit to clear her out.  But last phone call today revealed that she is having significant diarrhea so lets hold off on prep for now.  Plan is to recheck on Friday to see how she is doing.

## 2024-10-15 NOTE — TELEPHONE ENCOUNTER
Recommend patient go to the ER given weakness, and persistent symptoms over the weekend and into this week.  She may be dehydrated and may need fluids.    Have her hold the Linzess for now given the diarrhea.  I would prefer her not to take anything for the diarrhea as she is typically severely constipated.  Can see if holding the Linzess will stop the diarrhea.    If holding Linzess stops the diarrhea, then we will need to restart something different that will not cause diarrhea.  Have her give us an update on Friday.

## 2024-10-16 ENCOUNTER — TELEPHONE (OUTPATIENT)
Dept: GASTROENTEROLOGY | Facility: CLINIC | Age: 34
End: 2024-10-16
Payer: COMMERCIAL

## 2024-10-16 NOTE — TELEPHONE ENCOUNTER
Hub staff attempted to follow warm transfer process and was unsuccessful     Caller: Karissa Roach    Relationship to patient: Self    Best call back number: [phone]    Patient is needing: PATIENT HAS A MEDICAL QUESTION.  PLEASE REACH OUT.  THANK YOU

## 2024-10-16 NOTE — TELEPHONE ENCOUNTER
Returned call to pt who stated she was in the ER yesterday and they blew her vein.  She stated her arm hurts and is bruised.  I have advised she go to urgent care or discuss further with her PCP as this is not GI related.  Pt verbalized understanding.

## 2024-10-16 NOTE — TELEPHONE ENCOUNTER
I have called the pt and passed on Sahra's recommendations and the pt verbalized understanding.    Pt was in the ER last night, please review ER notes    I have advised that the pt call back Friday with an update on her symptoms.

## 2024-10-18 ENCOUNTER — TELEPHONE (OUTPATIENT)
Dept: GASTROENTEROLOGY | Facility: CLINIC | Age: 34
End: 2024-10-18
Payer: COMMERCIAL

## 2024-10-18 NOTE — TELEPHONE ENCOUNTER
"Attempted to call pt, rec'd a recording stating \"the number you are trying to call is not reachable\".  Update sent to Sahra LAUGHLIN.   Will try again later.   "

## 2024-10-18 NOTE — TELEPHONE ENCOUNTER
Called pt, she states she is still having lower abd pain, more prevalent on the right side, also having low back pain.  States she had one diarrhea stool yesterday that was yellow-green in color.  Pt denied blood or dark stool. Pt states she has a fever and is feels like she is burning up but has not taken temperature. Has not taken anything for fever.  Advised will send a msg to [facility].

## 2024-10-18 NOTE — TELEPHONE ENCOUNTER
Tried to call patient, no answer.  Went to the ED on 10/15 with contrasted CT scan showing small volume of food debris and fluid in the stomach, new thickened appearance of second and third portions of duodenum, possible duodenitis.  Formed stool throughout most of the colon no evidence of colitis.  She is still severely constipated.  But reports diarrhea.  I tried to call patient but no answer on her phone.  I will try to call her back on Monday.    Recent EGD in February as summarized in recent office note.  She does need a colonoscopy but 2 attempts were made with colon full of stool.  It appears to continue to be full of stool on CT scan despite reports of diarrhea and taking anticonstipation medication such as Linzess.

## 2024-10-21 ENCOUNTER — TELEPHONE (OUTPATIENT)
Dept: GASTROENTEROLOGY | Facility: CLINIC | Age: 34
End: 2024-10-21

## 2024-10-21 DIAGNOSIS — R10.9 RIGHT SIDED ABDOMINAL PAIN: Primary | ICD-10-CM

## 2024-10-21 DIAGNOSIS — K82.8 BILIARY DYSKINESIA: ICD-10-CM

## 2024-10-21 RX ORDER — PRUCALOPRIDE 1 MG/1
2 TABLET, FILM COATED ORAL DAILY
Qty: 30 TABLET | Refills: 1 | Status: SHIPPED | OUTPATIENT
Start: 2024-10-21

## 2024-10-21 NOTE — TELEPHONE ENCOUNTER
Pain is right side of back and front. Urinating on herself. She reports cowpie consistency stools, a little bit a time, still days not going at all. She did bowel prep first followed by Linzess but then had all watery stools with linzess and made pain worse. Still had stool burden on CT in ED.

## 2024-10-23 ENCOUNTER — TELEPHONE (OUTPATIENT)
Dept: GASTROENTEROLOGY | Facility: CLINIC | Age: 34
End: 2024-10-23
Payer: COMMERCIAL

## 2024-10-23 NOTE — TELEPHONE ENCOUNTER
Caller: Karissa Roach    Relationship: Self    Best call back number: [phone]    Requested Prescriptions:   ALL     Pharmacy where request should be sent: [facility] [id number] - [address] - [phone]  - [phone] FX     Last office visit with prescribing clinician: 10/10/2024   Last telemedicine visit with prescribing clinician: Visit date not found   Next office visit with prescribing clinician: 11/7/2024     Additional details provided by patient: PRESCRIPTIONS WERE SENT TO  BUT PT NEEDS THEM SENT TO [facility] LISTED ABOVE     Does the patient have less than a 3 day supply:  [x] Yes  [] No    Would you like a call back once the refill request has been completed: [] Yes [] No    If the office needs to give you a call back, can they leave a voicemail: [] Yes [] No    Mary Jane Mark Rep   10/23/24 13:02 EDT

## 2024-10-24 RX ORDER — ONDANSETRON 8 MG/1
8 TABLET, ORALLY DISINTEGRATING ORAL EVERY 8 HOURS PRN
Qty: 30 TABLET | Refills: 0 | Status: SHIPPED | OUTPATIENT
Start: 2024-10-24

## 2024-10-24 RX ORDER — SUCRALFATE 1 G/1
1 TABLET ORAL 4 TIMES DAILY
Qty: 30 TABLET | Refills: 0 | Status: SHIPPED | OUTPATIENT
Start: 2024-10-24

## 2024-10-24 NOTE — TELEPHONE ENCOUNTER
There are actually no medications listed on this message.  I sent pantoprazole to [facility] pharmacy on 10/10/2024 with 6-month total.  I sent Motegrity to [facility] pharmacy on 10/21/2024 with 60 days total.    It looks like she had sucralfate and ondansetron sent from the ED on 10/15/2024 to [facility] pharmacy.  Are these the 2 medications she needs sent to [facility]?  Went ahead and sent these to [facility] for her.

## 2024-10-28 NOTE — TELEPHONE ENCOUNTER
"Sahra,  Multiple attempts to reach this pt.  Per note from hub pt needs \"all\" meds. And upon review of med list it looks like they are [facility] at [facility].   "

## 2024-10-28 NOTE — TELEPHONE ENCOUNTER
Okay.  I sent basically everything that we were responsible for to [facility] on [address] in the month of October.  She will call back if she needs something else.

## 2024-11-12 ENCOUNTER — TELEPHONE (OUTPATIENT)
Dept: GASTROENTEROLOGY | Facility: CLINIC | Age: 34
End: 2024-11-12
Payer: COMMERCIAL

## 2024-12-30 ENCOUNTER — TELEPHONE (OUTPATIENT)
Dept: GASTROENTEROLOGY | Facility: CLINIC | Age: 34
End: 2024-12-30
Payer: COMMERCIAL

## 2024-12-30 NOTE — TELEPHONE ENCOUNTER
Caller: Karissa Roach     Relationship: SELF    Best call back number: [phone]    What is your medical concern? PT IS CALLING TO REPORT THAT HER STOMACH IS SWOLLEN AND SHE HAS GOTTEN SICK ONCE TODAY.    How long has this issue been going on? TODAY    Is your provider already aware of this issue? YES    Have you been treated for this issue? YES

## 2024-12-31 NOTE — TELEPHONE ENCOUNTER
Returned patient's phone call. She states yesterday she had nausea and vomiting. Reports her abdomen was swollen but is feeling better this morning. Advised if her symptoms return to call back.

## 2025-02-13 ENCOUNTER — TELEPHONE (OUTPATIENT)
Dept: GASTROENTEROLOGY | Facility: CLINIC | Age: 35
End: 2025-02-13

## 2025-02-13 NOTE — TELEPHONE ENCOUNTER
Returned call to pt who is in a lot of pain and crying.  She said that she is passing bright red blood as well as black stool  pt reports she has lost 20lbs.  I have advised she go to the ER for further evaluation and she is agreeable.

## 2025-02-13 NOTE — TELEPHONE ENCOUNTER
Hub staff attempted to follow warm transfer process and was unsuccessful     Caller: Karissa Roach    Relationship to patient: Self    Best call back number: [phone]      Patient is needing: PTS APPT IS 3-21-25, PT STATED SHE IS HURTING REALLY BAD, SHE CAN'T HARDLY EAT BECAUSE IT HURTS, WHEN SHE WENT TO THE BATHROOM SHE SAW BRIGHT RED BLOOD AND DARK BLOOD. ADVISED TO GO TO THE ER IF SHE CAN'T GET IN SOONER. PLEASE CALL PT TO DISCUSS.

## 2025-02-14 NOTE — TELEPHONE ENCOUNTER
Called pt and pt can make appt on 02/19 at 815a.  Message sent to Flor to book appt.     Update sent to Sahra LAUGHLIN.    DISCHARGE

## 2025-02-14 NOTE — TELEPHONE ENCOUNTER
Does not appear that she went to the ED-at least did not come to [facility] or [facility].    I can see her next week on the 19th or 20th at 8:15a.

## 2025-04-24 ENCOUNTER — TELEPHONE (OUTPATIENT)
Dept: OBSTETRICS AND GYNECOLOGY | Facility: CLINIC | Age: 35
End: 2025-04-24
Payer: COMMERCIAL

## 2025-05-25 NOTE — TELEPHONE ENCOUNTER
Pt had egd and c/s while in pt back in Feb and march.  Pt seen by Yuni BLACKWOOD and is a Dr Nowak pt.  Message sent to Yuni BLACKWOOD to see how proceed.    No

## (undated) DEVICE — CANSTR SXN UTER NO FLTR SURG

## (undated) DEVICE — HOSE BT TO BT VAC CURETTAGE SNGL PT USE

## (undated) DEVICE — TBG W FLTR FOR BERKELEY SYSTEM

## (undated) DEVICE — PAD SANI MAXI W/ADHS SNG WRP 11IN

## (undated) DEVICE — CANN O2 ETCO2 FITS ALL CONN CO2 SMPL A/ 7IN DISP LF

## (undated) DEVICE — ADAPT CLN BIOGUARD AIR/H2O DISP

## (undated) DEVICE — CANN VAC GYN VACURETTE BERKELEY CRV 8MM 1P/U STRL

## (undated) DEVICE — FRCP BX RADJAW4 NDL 2.8 240CM LG OG BX40

## (undated) DEVICE — ST COL BERKELY TBG

## (undated) DEVICE — KT ORCA ORCAPOD DISP STRL

## (undated) DEVICE — TBG SXN CONN/F UNIV 1/4IN 10FT LF STRL

## (undated) DEVICE — STRAP STIRUP SLP RNG 19X3.5IN DISP

## (undated) DEVICE — BLCK/BITE BLOX W/DENTL/RIM W/STRAP 54F

## (undated) DEVICE — GLV SURG TRIUMPH CLASSIC PF LTX 7.5 STRL

## (undated) DEVICE — Device

## (undated) DEVICE — SACK GZ PERM

## (undated) DEVICE — SENSR O2 OXIMAX FNGR A/ 18IN NONSTR

## (undated) DEVICE — LN SMPL CO2 SHTRM SD STREAM W/M LUER

## (undated) DEVICE — LOU D & C HYSTEROSCOPY: Brand: MEDLINE INDUSTRIES, INC.